# Patient Record
Sex: MALE | Race: BLACK OR AFRICAN AMERICAN | NOT HISPANIC OR LATINO | Employment: FULL TIME | URBAN - METROPOLITAN AREA
[De-identification: names, ages, dates, MRNs, and addresses within clinical notes are randomized per-mention and may not be internally consistent; named-entity substitution may affect disease eponyms.]

---

## 2017-01-05 ENCOUNTER — HOSPITAL ENCOUNTER (OUTPATIENT)
Dept: RADIOLOGY | Facility: HOSPITAL | Age: 52
Discharge: HOME | End: 2017-01-05
Payer: COMMERCIAL

## 2017-01-05 ENCOUNTER — TRANSCRIBE ORDERS (OUTPATIENT)
Dept: ADMINISTRATIVE | Facility: HOSPITAL | Age: 52
End: 2017-01-05

## 2017-01-05 ENCOUNTER — LAB (OUTPATIENT)
Dept: LAB | Facility: HOSPITAL | Age: 52
End: 2017-01-05
Payer: COMMERCIAL

## 2017-01-05 ENCOUNTER — APPOINTMENT (OUTPATIENT)
Dept: LAB | Facility: HOSPITAL | Age: 52
End: 2017-01-05
Payer: COMMERCIAL

## 2017-01-05 ENCOUNTER — ALLSCRIPTS OFFICE VISIT (OUTPATIENT)
Dept: OTHER | Facility: OTHER | Age: 52
End: 2017-01-05

## 2017-01-05 DIAGNOSIS — E55.9 VITAMIN D DEFICIENCY: ICD-10-CM

## 2017-01-05 DIAGNOSIS — R21 RASH AND OTHER NONSPECIFIC SKIN ERUPTION: Primary | ICD-10-CM

## 2017-01-05 DIAGNOSIS — K42.9 UMBILICAL HERNIA WITHOUT OBSTRUCTION OR GANGRENE: ICD-10-CM

## 2017-01-05 DIAGNOSIS — R68.83 CHILLS (WITHOUT FEVER): ICD-10-CM

## 2017-01-05 DIAGNOSIS — Z98.84 BARIATRIC SURGERY STATUS: ICD-10-CM

## 2017-01-05 DIAGNOSIS — H61.23 IMPACTED CERUMEN OF BOTH EARS: ICD-10-CM

## 2017-01-05 DIAGNOSIS — R50.9 FEVER: ICD-10-CM

## 2017-01-05 DIAGNOSIS — R11.2 NAUSEA WITH VOMITING: ICD-10-CM

## 2017-01-05 DIAGNOSIS — E11.9 TYPE 2 DIABETES MELLITUS WITHOUT COMPLICATIONS (HCC): ICD-10-CM

## 2017-01-05 DIAGNOSIS — R53.83 OTHER FATIGUE: ICD-10-CM

## 2017-01-05 DIAGNOSIS — R21 RASH AND OTHER NONSPECIFIC SKIN ERUPTION: ICD-10-CM

## 2017-01-05 DIAGNOSIS — M79.672 PAIN OF LEFT FOOT: ICD-10-CM

## 2017-01-05 LAB
25(OH)D3 SERPL-MCNC: 20.9 NG/ML (ref 30–100)
ALBUMIN SERPL BCP-MCNC: 3.6 G/DL (ref 3.5–5)
ALP SERPL-CCNC: 89 U/L (ref 46–116)
ALT SERPL W P-5'-P-CCNC: 36 U/L (ref 12–78)
ANION GAP SERPL CALCULATED.3IONS-SCNC: 5 MMOL/L (ref 4–13)
AST SERPL W P-5'-P-CCNC: 18 U/L (ref 5–45)
BASOPHILS # BLD AUTO: 0.03 THOUSANDS/ΜL (ref 0–0.1)
BASOPHILS NFR BLD AUTO: 1 % (ref 0–1)
BILIRUB SERPL-MCNC: 0.3 MG/DL (ref 0.2–1)
BUN SERPL-MCNC: 9 MG/DL (ref 5–25)
CALCIUM SERPL-MCNC: 8.2 MG/DL (ref 8.3–10.1)
CHLORIDE SERPL-SCNC: 102 MMOL/L (ref 100–108)
CO2 SERPL-SCNC: 31 MMOL/L (ref 21–32)
CREAT SERPL-MCNC: 0.77 MG/DL (ref 0.6–1.3)
CREAT UR-MCNC: 230 MG/DL
EOSINOPHIL # BLD AUTO: 0.25 THOUSAND/ΜL (ref 0–0.61)
EOSINOPHIL NFR BLD AUTO: 4 % (ref 0–6)
ERYTHROCYTE [DISTWIDTH] IN BLOOD BY AUTOMATED COUNT: 13.1 % (ref 11.6–15.1)
EST. AVERAGE GLUCOSE BLD GHB EST-MCNC: 123 MG/DL
GFR SERPL CREATININE-BSD FRML MDRD: >60 ML/MIN/1.73SQ M
GLUCOSE SERPL-MCNC: 84 MG/DL (ref 65–140)
HBA1C MFR BLD: 5.9 % (ref 4.2–6.3)
HCT VFR BLD AUTO: 43.3 % (ref 36.5–49.3)
HGB BLD-MCNC: 13.8 G/DL (ref 12–17)
IRON SATN MFR SERPL: 17 %
IRON SERPL-MCNC: 65 UG/DL (ref 65–175)
LYMPHOCYTES # BLD AUTO: 2.42 THOUSANDS/ΜL (ref 0.6–4.47)
LYMPHOCYTES NFR BLD AUTO: 37 % (ref 14–44)
MCH RBC QN AUTO: 29.2 PG (ref 26.8–34.3)
MCHC RBC AUTO-ENTMCNC: 31.9 G/DL (ref 31.4–37.4)
MCV RBC AUTO: 92 FL (ref 82–98)
MICROALBUMIN UR-MCNC: 10.1 MG/L (ref 0–20)
MICROALBUMIN/CREAT 24H UR: 4 MG/G CREATININE (ref 0–30)
MONOCYTES # BLD AUTO: 0.79 THOUSAND/ΜL (ref 0.17–1.22)
MONOCYTES NFR BLD AUTO: 12 % (ref 4–12)
NEUTROPHILS # BLD AUTO: 3.12 THOUSANDS/ΜL (ref 1.85–7.62)
NEUTS SEG NFR BLD AUTO: 47 % (ref 43–75)
NRBC BLD AUTO-RTO: 0 /100 WBCS
PLATELET # BLD AUTO: 199 THOUSANDS/UL (ref 149–390)
PMV BLD AUTO: 8.9 FL (ref 8.9–12.7)
POTASSIUM SERPL-SCNC: 3.8 MMOL/L (ref 3.5–5.3)
PROT SERPL-MCNC: 7.5 G/DL (ref 6.4–8.2)
RBC # BLD AUTO: 4.72 MILLION/UL (ref 3.88–5.62)
SODIUM SERPL-SCNC: 138 MMOL/L (ref 136–145)
TIBC SERPL-MCNC: 392 UG/DL (ref 250–450)
VIT B12 SERPL-MCNC: 490 PG/ML (ref 100–900)
WBC # BLD AUTO: 6.62 THOUSAND/UL (ref 4.31–10.16)

## 2017-01-05 PROCEDURE — 82306 VITAMIN D 25 HYDROXY: CPT

## 2017-01-05 PROCEDURE — 83540 ASSAY OF IRON: CPT

## 2017-01-05 PROCEDURE — 71020 HB CHEST X-RAY 2VW FRONTAL&LATL: CPT

## 2017-01-05 PROCEDURE — 85025 COMPLETE CBC W/AUTO DIFF WBC: CPT

## 2017-01-05 PROCEDURE — 83550 IRON BINDING TEST: CPT

## 2017-01-05 PROCEDURE — 36415 COLL VENOUS BLD VENIPUNCTURE: CPT

## 2017-01-05 PROCEDURE — 82570 ASSAY OF URINE CREATININE: CPT

## 2017-01-05 PROCEDURE — 84590 ASSAY OF VITAMIN A: CPT

## 2017-01-05 PROCEDURE — 82607 VITAMIN B-12: CPT

## 2017-01-05 PROCEDURE — 86592 SYPHILIS TEST NON-TREP QUAL: CPT

## 2017-01-05 PROCEDURE — 82043 UR ALBUMIN QUANTITATIVE: CPT

## 2017-01-05 PROCEDURE — 83036 HEMOGLOBIN GLYCOSYLATED A1C: CPT

## 2017-01-05 PROCEDURE — 86710 INFLUENZA VIRUS ANTIBODY: CPT

## 2017-01-05 PROCEDURE — 80053 COMPREHEN METABOLIC PANEL: CPT

## 2017-01-06 LAB — RPR SER QL: NORMAL

## 2017-01-08 LAB
FLUAV AB TITR SER CF: ABNORMAL {TITER}
FLUBV AB TITR SER CF: ABNORMAL {TITER}

## 2017-01-09 LAB — VIT A SERPL-MCNC: 39 UG/DL (ref 24–85)

## 2017-01-10 ENCOUNTER — ALLSCRIPTS OFFICE VISIT (OUTPATIENT)
Dept: OTHER | Facility: OTHER | Age: 52
End: 2017-01-10

## 2017-01-31 ENCOUNTER — ALLSCRIPTS OFFICE VISIT (OUTPATIENT)
Dept: OTHER | Facility: OTHER | Age: 52
End: 2017-01-31

## 2017-05-10 DIAGNOSIS — E55.9 VITAMIN D DEFICIENCY: ICD-10-CM

## 2017-05-10 DIAGNOSIS — Z98.84 BARIATRIC SURGERY STATUS: ICD-10-CM

## 2017-05-10 DIAGNOSIS — K91.2 POSTSURGICAL MALABSORPTION, NOT ELSEWHERE CLASSIFIED: ICD-10-CM

## 2017-05-10 DIAGNOSIS — E11.9 TYPE 2 DIABETES MELLITUS WITHOUT COMPLICATIONS (HCC): ICD-10-CM

## 2017-05-10 DIAGNOSIS — I10 ESSENTIAL (PRIMARY) HYPERTENSION: ICD-10-CM

## 2017-05-30 ENCOUNTER — GENERIC CONVERSION - ENCOUNTER (OUTPATIENT)
Dept: OTHER | Facility: OTHER | Age: 52
End: 2017-05-30

## 2017-05-31 LAB
25(OH)D3 SERPL-MCNC: 26.7 NG/ML (ref 30–100)
A/G RATIO (HISTORICAL): 1.7 (ref 1.2–2.2)
ALBUMIN SERPL BCP-MCNC: 4.2 G/DL (ref 3.5–5.5)
ALP SERPL-CCNC: 65 IU/L (ref 39–117)
ALT SERPL W P-5'-P-CCNC: 26 IU/L (ref 0–44)
AST SERPL W P-5'-P-CCNC: 20 IU/L (ref 0–40)
BASOPHILS # BLD AUTO: 0 X10E3/UL (ref 0–0.2)
BASOPHILS # BLD AUTO: 1 %
BILIRUB SERPL-MCNC: 0.5 MG/DL (ref 0–1.2)
BUN SERPL-MCNC: 11 MG/DL (ref 6–24)
BUN/CREA RATIO (HISTORICAL): 14 (ref 9–20)
CALCIUM SERPL-MCNC: 9.1 MG/DL (ref 8.7–10.2)
CHLORIDE SERPL-SCNC: 102 MMOL/L (ref 96–106)
CHOLEST SERPL-MCNC: 151 MG/DL (ref 100–199)
CO2 SERPL-SCNC: 24 MMOL/L (ref 18–29)
CREAT SERPL-MCNC: 0.81 MG/DL (ref 0.76–1.27)
CREATININE, URINE (HISTORICAL): 279.9 MG/DL
DEPRECATED RDW RBC AUTO: 13.4 % (ref 12.3–15.4)
EGFR AFRICAN AMERICAN (HISTORICAL): 116 ML/MIN/1.73
EGFR-AMERICAN CALC (HISTORICAL): 101 ML/MIN/1.73
EOSINOPHIL # BLD AUTO: 0.2 X10E3/UL (ref 0–0.4)
EOSINOPHIL # BLD AUTO: 3 %
GLUCOSE SERPL-MCNC: 87 MG/DL (ref 65–99)
HBA1C MFR BLD HPLC: 6 % (ref 4.8–5.6)
HCT VFR BLD AUTO: 39.8 % (ref 37.5–51)
HDLC SERPL-MCNC: 59 MG/DL
HGB BLD-MCNC: 13.2 G/DL (ref 12.6–17.7)
IMM.GRANULOCYTES (CD4/8) (HISTORICAL): 0 %
IMM.GRANULOCYTES (CD4/8) (HISTORICAL): 0 X10E3/UL (ref 0–0.1)
IRON SATN MFR SERPL: 28 % (ref 15–55)
IRON SERPL-MCNC: 111 UG/DL (ref 38–169)
LDL CHOLESTEROL DIRECT (HISTORICAL): 87 MG/DL (ref 0–99)
LDLC SERPL CALC-MCNC: 83 MG/DL (ref 0–99)
LYMPHOCYTES # BLD AUTO: 2.4 X10E3/UL (ref 0.7–3.1)
LYMPHOCYTES # BLD AUTO: 42 %
MCH RBC QN AUTO: 29.6 PG (ref 26.6–33)
MCHC RBC AUTO-ENTMCNC: 33.2 G/DL (ref 31.5–35.7)
MCV RBC AUTO: 89 FL (ref 79–97)
MICROALBUM.,U,RANDOM (HISTORICAL): 10.5 UG/ML
MICROALBUMIN/CREATININE RATIO (HISTORICAL): 3.8 MG/G CREAT (ref 0–30)
MONOCYTES # BLD AUTO: 0.4 X10E3/UL (ref 0.1–0.9)
MONOCYTES (HISTORICAL): 7 %
NEUTROPHILS # BLD AUTO: 2.7 X10E3/UL (ref 1.4–7)
NEUTROPHILS # BLD AUTO: 47 %
PLATELET # BLD AUTO: 210 X10E3/UL (ref 150–379)
POTASSIUM SERPL-SCNC: 4.1 MMOL/L (ref 3.5–5.2)
RBC (HISTORICAL): 4.46 X10E6/UL (ref 4.14–5.8)
SODIUM SERPL-SCNC: 142 MMOL/L (ref 134–144)
TIBC SERPL-MCNC: 397 UG/DL (ref 250–450)
TOT. GLOBULIN, SERUM (HISTORICAL): 2.5 G/DL (ref 1.5–4.5)
TOTAL PROTEIN (HISTORICAL): 6.7 G/DL (ref 6–8.5)
TRIGL SERPL-MCNC: 43 MG/DL (ref 0–149)
TSH SERPL DL<=0.05 MIU/L-ACNC: 2.25 UIU/ML (ref 0.45–4.5)
UIBC (HISTORICAL): 286 UG/DL (ref 111–343)
VIT B12 SERPL-MCNC: 1170 PG/ML (ref 211–946)
VLDLC SERPL CALC-MCNC: 9 MG/DL (ref 5–40)
WBC # BLD AUTO: 5.7 X10E3/UL (ref 3.4–10.8)

## 2017-06-01 LAB — VITAMIN B1, WHOLE BLOOD (HISTORICAL): 82.4 NMOL/L (ref 66.5–200)

## 2017-06-07 ENCOUNTER — ALLSCRIPTS OFFICE VISIT (OUTPATIENT)
Dept: OTHER | Facility: OTHER | Age: 52
End: 2017-06-07

## 2017-08-07 ENCOUNTER — ALLSCRIPTS OFFICE VISIT (OUTPATIENT)
Dept: OTHER | Facility: OTHER | Age: 52
End: 2017-08-07

## 2017-08-25 ENCOUNTER — GENERIC CONVERSION - ENCOUNTER (OUTPATIENT)
Dept: OTHER | Facility: OTHER | Age: 52
End: 2017-08-25

## 2017-09-26 ENCOUNTER — GENERIC CONVERSION - ENCOUNTER (OUTPATIENT)
Dept: OTHER | Facility: OTHER | Age: 52
End: 2017-09-26

## 2017-09-26 LAB
AMBIG ABBREV CMP14 DEFAULT (HISTORICAL): NORMAL
AMBIG ABBREV LP DEFAULT (HISTORICAL): NORMAL
DEPRECATED RDW RBC AUTO: 13.6 % (ref 12.3–15.4)
HBA1C MFR BLD HPLC: 5.8 % (ref 4.8–5.6)
HCT VFR BLD AUTO: 41.8 % (ref 37.5–51)
HGB BLD-MCNC: 13.5 G/DL (ref 12.6–17.7)
MCH RBC QN AUTO: 29.3 PG (ref 26.6–33)
MCHC RBC AUTO-ENTMCNC: 32.3 G/DL (ref 31.5–35.7)
MCV RBC AUTO: 91 FL (ref 79–97)
PLATELET # BLD AUTO: 239 X10E3/UL (ref 150–379)
RBC (HISTORICAL): 4.6 X10E6/UL (ref 4.14–5.8)
WBC # BLD AUTO: 6 X10E3/UL (ref 3.4–10.8)

## 2017-09-27 LAB
25(OH)D3 SERPL-MCNC: 21.4 NG/ML (ref 30–100)
A/G RATIO (HISTORICAL): 1.4 (ref 1.2–2.2)
ALBUMIN SERPL BCP-MCNC: 4 G/DL (ref 3.5–5.5)
ALP SERPL-CCNC: 67 IU/L (ref 39–117)
ALT SERPL W P-5'-P-CCNC: 33 IU/L (ref 0–44)
AST SERPL W P-5'-P-CCNC: 25 IU/L (ref 0–40)
BILIRUB SERPL-MCNC: 0.4 MG/DL (ref 0–1.2)
BUN SERPL-MCNC: 8 MG/DL (ref 6–24)
BUN/CREA RATIO (HISTORICAL): 11 (ref 9–20)
CALCIUM SERPL-MCNC: 9 MG/DL (ref 8.7–10.2)
CHLORIDE SERPL-SCNC: 104 MMOL/L (ref 96–106)
CHOLEST SERPL-MCNC: 155 MG/DL (ref 100–199)
CO2 SERPL-SCNC: 24 MMOL/L (ref 18–29)
CREAT SERPL-MCNC: 0.74 MG/DL (ref 0.76–1.27)
EGFR AFRICAN AMERICAN (HISTORICAL): 121 ML/MIN/1.73
EGFR-AMERICAN CALC (HISTORICAL): 105 ML/MIN/1.73
FERRITIN SERPL-MCNC: 62 NG/ML (ref 30–400)
FOLATE SERPL-MCNC: 5.9 NG/ML
GLUCOSE SERPL-MCNC: 93 MG/DL (ref 65–99)
HDLC SERPL-MCNC: 62 MG/DL
LDLC SERPL CALC-MCNC: 82 MG/DL (ref 0–99)
POTASSIUM SERPL-SCNC: 4.2 MMOL/L (ref 3.5–5.2)
PTH-INTACT SERPL-MCNC: 66 PG/ML (ref 15–65)
SODIUM SERPL-SCNC: 143 MMOL/L (ref 134–144)
TOT. GLOBULIN, SERUM (HISTORICAL): 2.9 G/DL (ref 1.5–4.5)
TOTAL PROTEIN (HISTORICAL): 6.9 G/DL (ref 6–8.5)
TRIGL SERPL-MCNC: 57 MG/DL (ref 0–149)
VIT B12 SERPL-MCNC: 569 PG/ML (ref 211–946)
VLDLC SERPL CALC-MCNC: 11 MG/DL (ref 5–40)

## 2017-09-28 LAB — VITAMIN A LEVEL (HISTORICAL): 54 UG/DL (ref 24–85)

## 2017-09-29 LAB — VITAMIN B1, WHOLE BLOOD (HISTORICAL): 100.6 NMOL/L (ref 66.5–200)

## 2017-10-04 ENCOUNTER — GENERIC CONVERSION - ENCOUNTER (OUTPATIENT)
Dept: OTHER | Facility: OTHER | Age: 52
End: 2017-10-04

## 2017-10-27 ENCOUNTER — GENERIC CONVERSION - ENCOUNTER (OUTPATIENT)
Dept: OTHER | Facility: OTHER | Age: 52
End: 2017-10-27

## 2017-10-28 ENCOUNTER — TRANSCRIBE ORDERS (OUTPATIENT)
Dept: ADMINISTRATIVE | Facility: HOSPITAL | Age: 52
End: 2017-10-28

## 2017-10-28 DIAGNOSIS — R10.9 ACUTE ABDOMINAL PAIN: Primary | ICD-10-CM

## 2017-10-28 DIAGNOSIS — Z98.84 STATUS POST BARIATRIC SURGERY: ICD-10-CM

## 2017-10-28 DIAGNOSIS — K43.9 EPIGASTRIC HERNIA: ICD-10-CM

## 2017-11-01 ENCOUNTER — HOSPITAL ENCOUNTER (OUTPATIENT)
Dept: CT IMAGING | Facility: HOSPITAL | Age: 52
Discharge: HOME | End: 2017-11-01
Payer: COMMERCIAL

## 2017-11-01 ENCOUNTER — GENERIC CONVERSION - ENCOUNTER (OUTPATIENT)
Dept: OTHER | Facility: OTHER | Age: 52
End: 2017-11-01

## 2017-11-01 DIAGNOSIS — K43.9 EPIGASTRIC HERNIA: ICD-10-CM

## 2017-11-01 DIAGNOSIS — R10.9 ACUTE ABDOMINAL PAIN: ICD-10-CM

## 2017-11-01 DIAGNOSIS — Z98.84 STATUS POST BARIATRIC SURGERY: ICD-10-CM

## 2017-11-01 PROCEDURE — 74177 CT ABD & PELVIS W/CONTRAST: CPT

## 2017-11-01 RX ADMIN — IOHEXOL 100 ML: 350 INJECTION, SOLUTION INTRAVENOUS at 07:26

## 2017-11-09 ENCOUNTER — GENERIC CONVERSION - ENCOUNTER (OUTPATIENT)
Dept: OTHER | Facility: OTHER | Age: 52
End: 2017-11-09

## 2017-11-27 ENCOUNTER — GENERIC CONVERSION - ENCOUNTER (OUTPATIENT)
Dept: OTHER | Facility: OTHER | Age: 52
End: 2017-11-27

## 2017-12-07 DIAGNOSIS — E55.9 VITAMIN D DEFICIENCY: ICD-10-CM

## 2017-12-07 DIAGNOSIS — E11.9 TYPE 2 DIABETES MELLITUS WITHOUT COMPLICATIONS (HCC): ICD-10-CM

## 2017-12-07 DIAGNOSIS — I10 ESSENTIAL (PRIMARY) HYPERTENSION: ICD-10-CM

## 2017-12-07 DIAGNOSIS — Z12.5 ENCOUNTER FOR SCREENING FOR MALIGNANT NEOPLASM OF PROSTATE: ICD-10-CM

## 2017-12-07 DIAGNOSIS — Z11.59 ENCOUNTER FOR SCREENING FOR OTHER VIRAL DISEASES: ICD-10-CM

## 2017-12-07 DIAGNOSIS — B37.2 CANDIDIASIS OF SKIN AND NAIL: ICD-10-CM

## 2017-12-07 DIAGNOSIS — Z98.84 BARIATRIC SURGERY STATUS: ICD-10-CM

## 2017-12-07 DIAGNOSIS — K91.2 POSTSURGICAL MALABSORPTION, NOT ELSEWHERE CLASSIFIED: ICD-10-CM

## 2018-01-11 NOTE — MISCELLANEOUS
Message  I had previously reviewed CT findings briefly with patient  Advised him that he should make follow-up with Dr Ilana Verdin  I called and left him a message as a reminder to make a follow-up with Dr Ilana Verdin to review his most recent CT scan of abdomen/pelvis   CR      Signatures   Electronically signed by : CELSA Keller; Nov 9 2017 11:53AM EST                       (Author)

## 2018-01-11 NOTE — MISCELLANEOUS
Message  Return to work or school:   Zeferino Be is under my professional care  He was seen in my office on 01/29/2016   Patient brought daughter Christina Maddox for consult appointment for bariatric surgery                Signatures   Electronically signed by : Krzysztof Angel, ; Jan 29 2016  2:13PM EST                       (Author)

## 2018-01-13 VITALS
SYSTOLIC BLOOD PRESSURE: 120 MMHG | HEART RATE: 57 BPM | DIASTOLIC BLOOD PRESSURE: 80 MMHG | WEIGHT: 296 LBS | BODY MASS INDEX: 41.44 KG/M2 | TEMPERATURE: 96.4 F | OXYGEN SATURATION: 98 % | HEIGHT: 71 IN

## 2018-01-13 VITALS
HEART RATE: 55 BPM | BODY MASS INDEX: 41.02 KG/M2 | DIASTOLIC BLOOD PRESSURE: 80 MMHG | HEIGHT: 71 IN | WEIGHT: 293 LBS | OXYGEN SATURATION: 97 % | TEMPERATURE: 95.9 F | SYSTOLIC BLOOD PRESSURE: 126 MMHG

## 2018-01-14 VITALS
WEIGHT: 289 LBS | OXYGEN SATURATION: 98 % | SYSTOLIC BLOOD PRESSURE: 132 MMHG | DIASTOLIC BLOOD PRESSURE: 74 MMHG | TEMPERATURE: 96.7 F | HEIGHT: 71 IN | HEART RATE: 62 BPM | BODY MASS INDEX: 40.46 KG/M2

## 2018-01-14 VITALS
SYSTOLIC BLOOD PRESSURE: 125 MMHG | HEART RATE: 63 BPM | OXYGEN SATURATION: 98 % | TEMPERATURE: 97.3 F | DIASTOLIC BLOOD PRESSURE: 80 MMHG

## 2018-01-14 NOTE — MISCELLANEOUS
Provider Comments  Provider Comments:   PATIENT NO SHOWED FOR 10- APPT  Signatures   Electronically signed by :  Juliana Nice; Oct 25 2016 10:56AM EST                       (Author)

## 2018-01-15 VITALS
TEMPERATURE: 97.3 F | HEIGHT: 71 IN | DIASTOLIC BLOOD PRESSURE: 80 MMHG | HEART RATE: 54 BPM | SYSTOLIC BLOOD PRESSURE: 130 MMHG | OXYGEN SATURATION: 97 % | BODY MASS INDEX: 41.23 KG/M2 | WEIGHT: 294.5 LBS

## 2018-01-15 NOTE — PROGRESS NOTES
Message  placed follow up call  scheduled appointment for weight regain for 1/10/2018 at 2pm  invited DM, patient's dietitian  Active Problems    1  Abdominal pain (789 00) (R10 9)   2  Backache (724 5) (M54 9)   3  Balanitis (607 1) (N48 1)   4  BRBPR (bright red blood per rectum) (569 3) (K62 5)   5  Candidal skin infection (112 3) (B37 2)   6  Constipation (564 00) (K59 00)   7  Contact dermatitis (692 9) (L25 9)   8  Diabetes mellitus type II, controlled (250 00) (E11 9)   9  Discoloration of skin (709 00) (L81 9)   10  Encounter for colorectal cancer screening (V76 51) (Z12 11,Z12 12)   11  Encounter for prostate cancer screening (V76 44) (Z12 5)   12  Fatigue (780 79) (R53 83)   13  Fever (780 60) (R50 9)   14  Flu vaccine need (V04 81) (Z23)   15  H/O bariatric surgery (V45 86) (Z98 84)   16  Hypertension (401 9) (I10)   17  Impacted cerumen of both ears (380 4) (H61 23)   18  Influenza (487 1) (J11 1)   19  Internal hemorrhoid (455 0) (K64 8)   20  Left foot pain (729 5) (M79 672)   21  Lichen planus (777 7) (L43 9)   22  Nausea and vomiting (787 01) (R11 2)   23  Need for hepatitis C screening test (V73 89) (Z11 59)   24  Obesity surgery status (V45 86) (Z98 84)   25  Organic impotence (607 84) (N52 9)   26  Osteoarthritis of first metatarsophalangeal joint (715 98) (M19 079)   27  Penile cyst (607 89) (N48 89)   28  Postgastrectomy malabsorption (579 3) (K91 2,Z90 3)   29  Rash (782 1) (R21)   30  Seborrheic keratoses, inflamed (702 11) (L82 0)   31  Secondary hyperparathyroidism (588 81) (N25 81)   32  Secondary non-renal hyperparathyroidism (252 02) (E21 1)   33  Skin lesion (709 9) (L98 9)   34  Status post gastric surgery (V45 89) (Z98 890)   35  Umbilical hernia (087 2) (K42 9)   36  Ventral hernia (553 20) (K43 9)   37  Viral gastroenteritis (008 8) (A08 4)   38  Vitamin D deficiency (268 9) (E55 9)   39  Weight gain following gastric bypass surgery (783 1) (R63 5)    Current Meds   1  Anusol-HC 25 MG Rectal Suppository; INSERT 1 SUPPOSITORY RECTALLY 2 TIMES   DAILY; Therapy: 11Aug2015 to (Evaluate:17Aug2015)  Requested for: 11Aug2015; Last   Rx:11Aug2015 Ordered   2  Blood Glucose Monitor System w/Device Kit; use as directed; Therapy: 39GRM0076 to (Last Rx:19Oct2015)  Requested for: 19Oct2015 Ordered   3  Calcium Citrate +D TABS Recorded   4  Cialis 20 MG Oral Tablet; Therapy: (Recorded:26Hky3085) to Recorded   5  Clobetasol Propionate 0 05 % External Ointment; Therapy: (Recorded:07Jun2017) to Recorded   6  Debrox 6 5 % Otic Solution; Instill 5-10 drops each ear twice per day x 7 days; Therapy: 56OOC3096 to (Last Rx:10Jan2017) Ordered   7  Docusate Sodium 100 MG Oral Capsule; Take 1 daily; Therapy: 11Aug2015 to (Evaluate:10Sep2015)  Requested for: 11Aug2015; Last   Rx:11Aug2015 Ordered   8  Econazole Nitrate 1 % External Cream; APPLY AND GENTLY MASSAGE INTO   AFFECTED AREA(S) TWICE DAILY; Therapy: 86EWN4155 to (Last Rx:07Jun2017)  Requested for: 07Jun2017 Ordered   9  FreeStyle Lancets Miscellaneous; TEST BLOOD SUGAR ONCE DAILY; Therapy: 44Ndu3932 to (Last Rx:02Sep2016)  Requested for: 02Sep2016 Ordered   10  FreeStyle Test In Vitro Strip; TEST BLOOD SUGAS TWICE DAILY but at different times    before every meal and 2 hrs after every meal and at HS; Therapy: 74Hmp9867 to (Last Rx:02Sep2016)  Requested for: 02Sep2016 Ordered   11  Multi-Vitamin TABS; Therapy: (Barry Saliva) to Recorded   12  Nystatin 526472 UNIT/GM External Cream; Apply twice daily; Therapy: 17Pfa7991 to (Evaluate:04Oct2016)  Requested for: 14Sep2016; Last    Rx:14Sep2016 Ordered   13  Omega 3 CAPS; Therapy: (YQGNXQJK:58QVH2409) to Recorded   14  Vitamin B-12 5000 MCG Sublingual Tablet Sublingual Recorded   15  Vitamin D3 2000 UNIT Oral Tablet; Take 1 tablet daily; Therapy: 07RAZ1966 to (Last Rx:07Jun2017) Ordered    Allergies    1   No Known Drug Allergies    Signatures   Electronically signed by : Miles Sanders; Nov 27 2017 12:39PM EST                       (Author)

## 2018-01-15 NOTE — MISCELLANEOUS
Provider Comments  Provider Comments:     Dear Karen Thompson had a scheduled appointment at our office for 08/25/2017 that you called to cancel but did not reschedule for another day  It is very important that you follow up with us so that we can assess your physical and nutritional safety after your surgery  Please call our office at [phone] to reschedule your appointment       Sincerely,     [facility]            Signatures   Electronically signed by : Lauren Hurd, ; Aug 25 2017  8:45AM EST                       (Author)

## 2018-01-16 NOTE — RESULT NOTES
Discussion/Summary   September 2017 labs reviewed  Your parathyroid hormone level remains mildly high at 66 but has improved slightly from previous level of 68-cuurent mildly high level can indicate that you are losing calcium from your bones and /or not getting enough vitamin D  Recommend that you take a TOTAL of 1500 mg-1900 mg of calcium per day (this includes the calcium in all your supplements added together)  Calcium needs to be taken in divided doses of 500 to 600 mg each  spaced through the day and at least 2 hours apart and 2 hours from any iron for best absorption      Last year I had referred you to [facility]   If you did not get a call back from their office last year, please let me know  You would need your parathryoid hormone level, calcium level and vitamin D level repeated in 3-4 months  I would like you to see the endocrinologist for this now  Your vitamin D remains low at 21 4  I am sending a prescription to your pharmacy for high potency vitamin D2- 50,000 IU -take this weekly for 8 weeks   This can also be followed by the endocrinologist    If you have not had a DEXA bone scan to check for osteoporosis i would highly recommend that you discuss this with your PCP or Endocrinologist   Once you are eligible for this insurance generally allows this test every 2 years  Your hemoglobin A 1c is a report card of your blood sugar control over the past 3 months  Your hemoglobin is in the âpre-diabetic rangeâ at 5 8%  Continued weight loss (IF NEEDED) can help improve this level  Avoiding simple sugars and having more âcomplex carbohydratesâ in the diet -like vegetables, fruits and whole grains as your diet allows can also help  â   You should also review this with your PCP/Endocrinologist at a routine office visit  The rest of your vitamin/mineral levels are within acceptable range  Please keep your routine office visit   If you do not have a scheduled routine appointment, please call the office to schedule it  Our office number is [phone]  If you have questions about your results, this will be discussed with you at your upcoming  visit  If you have gotten your most recent labs after your recent visit and have questions, please call the office  I look forward to seeing you at your next visit  Last year you were taking an Extra 5000 IU of vitamin D3 daily  -you can hold the daily dose until you have completed the 8 weeks of high dose vitamin D-but then should restart the daily dose-      You should add an EXTRA 2000 IU of vitamin D3 daily to whatever you are currently taking   (so if you were taking 5000 IU daily then increase it to 7000 IU daily)              Verified Results  (1) COMPREHENSIVE METABOLIC PANEL 23FCH8701 89:68XD Shiawasseeedis Garza     Test Name Result Flag Reference   Glucose, Serum 93 mg/dL  65-99   BUN 8 mg/dL  6-24   Creatinine, Serum 0 74 mg/dL L 0 76-1 27   BUN/Creatinine Ratio 11  9-20   Sodium, Serum 143 mmol/L  134-144   Potassium, Serum 4 2 mmol/L  3 5-5 2   Chloride, Serum 104 mmol/L     Carbon Dioxide, Total 24 mmol/L  18-29   Calcium, Serum 9 0 mg/dL  8 7-10 2   Protein, Total, Serum 6 9 g/dL  6 0-8 5   Albumin, Serum 4 0 g/dL  3 5-5 5   Globulin, Total 2 9 g/dL  1 5-4 5   A/G Ratio 1 4  1 2-2 2   Bilirubin, Total 0 4 mg/dL  0 0-1 2   Alkaline Phosphatase, S 67 IU/L     AST (SGOT) 25 IU/L  0-40   ALT (SGPT) 33 IU/L  0-44   eGFR If NonAfricn Am 105 mL/min/1 73  >59   eGFR If Africn Am 121 mL/min/1 73  >59     (1) CBC/ PLT (NO DIFF) 64Ssm2066 10:36AM Shiawasseeedis Garza     Test Name Result Flag Reference   WBC 6 0 x10E3/uL  3 4-10 8   RBC 4 60 x10E6/uL  4 14-5 80   Hemoglobin 13 5 g/dL  12 6-17 7   Hematocrit 41 8 %  37 5-51 0   MCV 91 fL  79-97   MCH 29 3 pg  26 6-33 0   MCHC 32 3 g/dL  31 5-35 7   RDW 13 6 %  12 3-15 4   Platelets 857 V11S8/ZN  150-379     ([address] [place] [address]) Lipid Panel 78BLQ2167 10:36AM Lizabeth, Monique Ambrose     Test Name Result Flag Reference   Cholesterol, Total 155 mg/dL  100-199   Triglycerides 57 mg/dL  0-149   HDL Cholesterol 62 mg/dL  >39   VLDL Cholesterol Jose 11 mg/dL  5-40   LDL Cholesterol Calc 82 mg/dL  0-99     (1) HEMOGLOBIN A1C 18Pnf5086 10:36AM Haijosevale Sheron     Test Name Result Flag Reference   Hemoglobin A1c 5 8 % H 4 8-5 6   Pre-diabetes: 5 7 - 6 4           Diabetes: >6 4           Glycemic control for adults with diabetes: <7 0     (1) FOLATE 67Cvs4495 10:36AM EdwardAaron longmakayla Ambrose     Test Name Result Flag Reference   Folate (Folic Acid), Serum 5 9 ng/mL  >3 0   A serum folate concentration of less than 3 1 ng/mL is  considered to represent clinical deficiency  (1) VITAMIN D 25-HYDROXY 26Sep2017 10:36AM Lizabeth Monique Ambrose     Test Name Result Flag Reference   Vitamin D, 25-Hydroxy 21 4 ng/mL L 30 0-100 0   Vitamin D deficiency has been defined by the [address] [place] [address] 70 practice guideline as a  level of serum 25-OH vitamin D less than 20 ng/mL (1,2)  The Endocrine Society went on to further define vitamin D  insufficiency as a level between 21 and 29 ng/mL (2)  1  IOM ([place] of [employer])  2010  Dietary reference     intakes for calcium and D  [facility]: The     [employer]  2  Edson MF, Roberto POLANCO, Gabby DOLL, et al      Evaluation, treatment, and prevention of vitamin D     deficiency: an Endocrine Society clinical practice     guideline  JCEM  2011 Jul; 96(7):1911-30  (1) VITAMIN B12 19Tgc0467 10:36AM Terry [place]     Test Name Result Flag Reference   Vitamin B12 569 pg/mL  211-946     (1) FERRITIN 92WBQ1502 10:36AM [facility]     Test Name Result Flag Reference   Ferritin, Serum 62 ng/mL       [facility]) Haile Cortez CMP14 Default 42CNZ7265 10:36AM Terry [place]     Test Name Result Flag Reference   Haile Cortez CMP14 Default Comment     A hand-written panel/profile was received from your office  In  accordance with the [facility] Ambiguous Test Code Policy dated July 0913, we have completed your order by using the closest currently  or formerly recognized AMA panel  We have assigned Comprehensive  Metabolic Panel (14), Test Code #568437 to this request   If this  is not the testing you wished to receive on this specimen, please  contact the [address] [place] [address] Client Inquiry/Technical Services Department  to clarify the test order  We appreciate your business  [facility]) Marni Alcazaric LP Default [id number] 10:36AM Kelly Gutierrez     Test Name Result Flag Reference   Ambisrael Abbrev LP Default Comment     A hand-written panel/profile was received from your office  In  accordance with the [facility] Ambiguous Test Code Policy dated July 3509, we have completed your order by using the closest currently  or formerly recognized AMA panel  We have assigned Lipid Panel,  Test Code #812389 to this request  If this is not the testing you  wished to receive on this specimen, please contact the [address] [place] [address]  Client Inquiry/Technical Services Department to clarify the test  order  We appreciate your business  (1) PTH N-TERMINAL (INTACT) 86BYO5357 10:36AM Franklin Barone     Test Name Result Flag Reference   PTH, Intact 66 pg/mL H 15-65     (1) VITAMIN B1, WHOLE BLOOD 13WZX8890 10:36AM Kelly Toroen     Test Name Result Flag Reference   Vit   B1, Whole Blood 100 6 nmol/L  66 5-200 0     (1) VITAMIN A 72Rky6465 10:36AM Franklin Barone     Test Name Result Flag Reference   Vitamin A, Serum 54 ug/dL  24-85

## 2018-01-16 NOTE — PROGRESS NOTES
Assessment    1  Obesity surgery status (V45 86) (Z98 84)   2  Diabetes mellitus type II, controlled (250 00) (E11 9)   3  Postgastrectomy malabsorption (579 3) (K91 2)   4  Secondary hyperparathyroidism (588 81) (N25 81)   5  Umbilical hernia (945 3) (K42 9)   6  Encounter for preventive health examination (V70 0) (Z00 00)    Discussion/Summary    Follow-up in 4 months  Follow diet as discussed  Get lab work done prior to your next office visit  Follow vitamin/mineral recommendations as reviewed with you  Exercise as tolerated  Call our office if you have any problems with abdominal pain especially if associated with fever, chills, nausea, vomiting, or any other concerns  1 s/p lap Shae-en-y gastric bypass surgery 2  umbilical hernia  42-UCNY-XWL male status post laparoscopic Shae-en-Y gastric bypass surgery with lysis of adhesions  May third 2011  By Dr Andrews Henley returns in followup to monitor his weight  He has a long-standing h/o periumbilical hernia and wants surgery on this at some point  The area has not caused him more than mild discomfort at times  He knows that the optimal time to get this repaired is after he loses weight  Goal pre-op hernia repair weight as per Dr Andrews Henley is 200 lb  I had advised him on his diet and had recommended further follow-up with RD at his last visit and offered him follow-up with RD again  He has lost 6 lb since I saw him last   I again advised him on his diet/gave him some suggestions to help with continued weight loss  He reports with his current work schedule it is difficult to meet with our RD now  A 24-hour diet recall was obtained from the patient and he is substituting protein drinks for 2 meals/day  I recommended that he have 2 meals and only one meal replacement shake but to increase his protein and vegetable and eat less of his starch at the meals to help with weight loss   He is active at his job but is not doing regular exercise-I encouraged him with increasing exercise as tolerated  He has maintained a 61% excess body weight loss at this point  3  Malabsorption-Pt is at risk for vitamin and mineral deficiencies secondary to the malabsorption and restriction of intakes  I reviewed  His current vitamin and mineral intake, and I advised him on the supplements  he is overdue to have his labs done from last year  I encouraged him to get labs done prior to his next visit so I can better advise him on his supplements  4  secondary hyperparathyroidism-he has been taking extra vitamin D-he indicates his PCP advised him not to take extra calcium -so will await these levels for further advice  I had advised him that if PTH remains high, I would like him to follow-up with ENDO for further management  5 DM-last hgA1c was 6% "pre-dm " range  -he remains off medication/followed by PCP Further weight loss may help  Chief Complaint  Patient in office today for 6 month weight monitor  He is not exercising as much and is taking his vitamins  Post-Op  Post-Op Bariatric Surgery:   Cami Ann is status post laparoscopic Shae-en-Y procedure, performed on 5/3/2011   with [facility] by Dr Andrews Henley  HPI: today's weight is 278 lb pounds, today's BMI is 39 3 and his total weight loss is 61% excess body weight loss pounds  The patient reports no nausea, no vomiting, no constipation, no diarrhea and no abdominal pain  Diet and Exercise: Diet history reviewed and discussed with the patient  Weight loss/gains to date discussed with the patient  Supplements: multivitamins and calcium  PE:   Abdominal exam: soft, nontender, no rebound tenderness, no guarding and no incisional hernia  + large periumbilical hernia   Assessment:   Post-op, the patient is progressing slowly  Plan: Activity restrictions: None     Instructions / Recommendations: dietary counseling recommended, recommended a daily protein intake of  grams, vitamin supplement(s) recommended, mineral supplement(s) recommended, recommended exercising at least 150 minutes per week, diet as advised and instructed to call the office for concerns, questions, or problems  The patient was instructed to follow up in 4 months  Review of Systems    Constitutional: planned weight loss, but not feeling poorly  Cardiovascular: no chest pain and no palpitations  Respiratory: no shortness of breath and no wheezing  Gastrointestinal: no abdominal pain, no nausea, no vomiting, no constipation and no diarrhea  Psychiatric: no anxiety and no depression  Active Problems    1  Abscess of skin (682 9) (L02 91)   2  Backache (724 5) (M54 9)   3  BRBPR (bright red blood per rectum) (569 3) (K62 5)   4  Constipation (564 00) (K59 00)   5  Contact dermatitis (692 9) (L25 9)   6  Diabetes mellitus type II, controlled (250 00) (E11 9)   7  Discoloration of skin (709 00) (L81 9)   8  Encounter for colorectal cancer screening (V76 51) (Z12 11,Z12 12)   9  Flu vaccine need (V04 81) (Z23)   10  Hypertension (401 9) (I10)   11  Impacted cerumen of both ears (380 4) (H61 23)   12  Internal hemorrhoid (455 0) (K64 8)   13  Obesity surgery status (V45 86) (Z98 84)   14  Penile cyst (607 89) (N48 89)   15  Postgastrectomy malabsorption (579 3) (K91 2)   16  Secondary hyperparathyroidism (588 81) (N25 81)   17  Umbilical hernia (083 1) (K42 9)   18  Vitamin D deficiency (268 9) (E55 9)    Social History    · Being A Social Drinker   · Denied: History of Drug Use   · Former smoker (V15 82) (C48 805)  The social history was reviewed and updated today  Current Meds   1  Anusol-HC 25 MG Rectal Suppository; INSERT 1 SUPPOSITORY RECTALLY 2 TIMES   DAILY; Therapy: 16Seu6250 to (Evaluate:19Hkm3999)  Requested for: 11Aug2015; Last   Rx:11Aug2015 Ordered   2  Blood Glucose Monitor System w/Device Kit; use as directed; Therapy: 81ETE9686 to (Last Rx:19Oct2015)  Requested for: 19Oct2015 Ordered   3   Calcium Citrate +D TABS Recorded   4  Docusate Sodium 100 MG Oral Capsule; Take 1 daily; Therapy: 11Aug2015 to (Evaluate:33Haz8424)  Requested for: 11Aug2015; Last   Rx:11Aug2015 Ordered   5  Multi-Vitamin TABS; Therapy: (Sandeep Goodman) to Recorded   6  Triamcinolone Acetonide 0 1 % External Cream; APPLY  AND RUB  IN A THIN FILM TO   AFFECTED AREAS TWICE DAILY  (AM AND PM); Therapy: 98KTD5558 to (Last Rx:35Frm6812)  Requested for: 84SSH2903 Ordered   7  Vitamin B-12 5000 MCG Sublingual Tablet Sublingual Recorded   8  Vitamin D3 06148 UNIT Oral Capsule; Therapy: (Recorded:29Jan2016) to Recorded    The medication list was reviewed and updated today  Allergies    1  No Known Drug Allergies    Vitals   Recorded: 88TSZ6182 01:36PM   Temperature 96 5 F   Heart Rate 58   Respiration 12   Systolic 608   Diastolic 88   Height 5 ft 10 5 in   Weight 278 lb    BMI Calculated 39 32   BSA Calculated 2 42     Physical Exam    Constitutional   General appearance: No acute distress, well appearing and well nourished  Eyes b/l conjunctiva without pallor  Ears, Nose, Mouth, and Throat oral mucosa moist    Pulmonary   Respiratory effort: No increased work of breathing or signs of respiratory distress  Auscultation of lungs: Clear to auscultation, equal breath sounds bilaterally, no wheezes, no rales, no rhonci  Cardiovascular   Auscultation of heart: Normal rate and rhythm, normal S1 and S2, without murmurs  Abdomen soft, no incisional hernias apprecaited; large umbilical hernia-reducible; NT to palpation     Musculoskeletal   Gait and station: Normal     Psychiatric   Orientation to person, place and time: Normal     Mood and affect: Normal          Future Appointments    Date/Time Provider Specialty Site   05/17/2016 11:00 AM Helen Viera, [facility] General Surgery [facility] [address]     Signatures   Electronically signed by : Yuri Laird, [facility]; Jan 29 2016  3:51PM EST                       (Author) Electronically signed by : FREEDOM Rubio ; Feb 4 2016  8:48AM EST                       (Validation)

## 2018-01-17 NOTE — RESULT NOTES
Verified Results  * CT ABDOMEN PELVIS W CONTRAST 41MDI1755 07:17AM Parrish Molina     Test Name Result Flag Reference   CT ABDOMEN PELVIS W CONTRAST (Report)     CT ABDOMEN AND PELVIS WITH IV CONTRAST     INDICATION: R10 9: Unspecified abdominal pain   Z98 84: Bariatric surgery status   K43 9: Ventral hernia without obstruction or gangrene  History taken directly from the electronic ordering system  COMPARISON: None  TECHNIQUE: CT examination of the abdomen and pelvis was performed  Reformatted images were created in axial, sagittal, and coronal planes  Radiation dose length product (DLP) for this visit: 6168 mGy-cm   This examination, like all CT scans performed in the [facility], was performed utilizing techniques to minimize radiation dose exposure, including the use of iterative    reconstruction and automated exposure control  IV Contrast: 100 mL of iohexol (OMNIPAQUE)      Enteric Contrast: Enteric contrast was not administered  FINDINGS:     ABDOMEN     LOWER CHEST: No significant abnormalities identified in the lower chest      LIVER/BILIARY TREE: Unremarkable  GALLBLADDER: No calcified gallstones  No pericholecystic inflammatory change  SPLEEN: Unremarkable  PANCREAS: Unremarkable  ADRENAL GLANDS: Unremarkable  KIDNEYS/URETERS: Parapelvic cysts are present  There are also hypoattenuating foci that are too small to characterize but likely represent cysts  STOMACH AND BOWEL: Postsurgical changes of gastric bypass surgery is noted  There is some mild thickening excluded portion of the stomach, which may related to underdistention  APPENDIX: No findings to suggest appendicitis  ABDOMINOPELVIC CAVITY: There is some focal mesenteric stranding adjacent to the periumbilical hernia defect, which likely represents focal fatty necrosis/omental infarct  No ascites or free intraperitoneal air  No lymphadenopathy       VESSELS: Unremarkable for patient's age  PELVIS     REPRODUCTIVE ORGANS: Unremarkable for patient's age  URINARY BLADDER: Unremarkable  ABDOMINAL WALL/INGUINAL REGIONS: Fat-containing periumbilical hernia is present  OSSEOUS STRUCTURES: No acute fracture or destructive osseous lesion  IMPRESSION:     Fat-containing periumbilical hernia  There is an area of likely fatty necrosis/omental infarct within the peritoneal cavity adjacent to the hernia defect  Postsurgical changes of gastric bypass surgery         Workstation performed: [id number]     Signed by:   Carlos Manuel Sebastian MD   11/1/17

## 2018-01-17 NOTE — MISCELLANEOUS
Provider Comments  Provider Comments:     Dear Krissy Short had a scheduled appointment at our office 5/17/2016 but were unable to keep  We attempted to call you back but were unable to reach you  It is very important that you follow up with us so that we can assess your physical and nutritional safety after your surgery  Please call our office at [phone] to reschedule your appointment       Sincerely,     [facility]              Signatures   Electronically signed by : Fabricio Hair, ; May 16 2016  9:47AM EST                       (Author)    Electronically signed by : Christian Galvez, [facility]; May 16 2016  4:08PM EST                       (Author)

## 2018-01-22 VITALS
WEIGHT: 291.5 LBS | RESPIRATION RATE: 20 BRPM | SYSTOLIC BLOOD PRESSURE: 134 MMHG | HEART RATE: 64 BPM | DIASTOLIC BLOOD PRESSURE: 70 MMHG | BODY MASS INDEX: 40.81 KG/M2 | HEIGHT: 71 IN | TEMPERATURE: 98.2 F

## 2018-01-24 ENCOUNTER — TELEPHONE (OUTPATIENT)
Dept: BARIATRICS | Facility: CLINIC | Age: 53
End: 2018-01-24

## 2018-03-14 ENCOUNTER — OFFICE VISIT (OUTPATIENT)
Dept: BARIATRICS | Facility: CLINIC | Age: 53
End: 2018-03-14

## 2018-03-14 ENCOUNTER — TELEPHONE (OUTPATIENT)
Dept: INTERNAL MEDICINE CLINIC | Facility: CLINIC | Age: 53
End: 2018-03-14

## 2018-03-14 VITALS — BODY MASS INDEX: 42.11 KG/M2 | HEIGHT: 71 IN | WEIGHT: 300.8 LBS

## 2018-03-14 VITALS — BODY MASS INDEX: 42.11 KG/M2 | WEIGHT: 300.8 LBS | HEIGHT: 71 IN

## 2018-03-14 DIAGNOSIS — E66.9 OBESITY, CLASS I, BMI 30-34.9: Primary | ICD-10-CM

## 2018-03-14 DIAGNOSIS — E66.01 OBESITY, CLASS III, BMI 40-49.9 (MORBID OBESITY) (HCC): Primary | ICD-10-CM

## 2018-03-14 DIAGNOSIS — E55.9 VITAMIN D DEFICIENCY: Primary | ICD-10-CM

## 2018-03-14 PROCEDURE — RECHECK

## 2018-03-14 PROCEDURE — RECHECK: Performed by: DIETITIAN, REGISTERED

## 2018-03-14 RX ORDER — ERGOCALCIFEROL 1.25 MG/1
50000 CAPSULE ORAL WEEKLY
Qty: 4 CAPSULE | Refills: 5 | Status: SHIPPED | OUTPATIENT
Start: 2018-03-14 | End: 2018-10-04 | Stop reason: SDUPTHER

## 2018-03-14 RX ORDER — ERGOCALCIFEROL 1.25 MG/1
50000 CAPSULE ORAL WEEKLY
Refills: 0 | COMMUNITY
Start: 2018-01-10 | End: 2018-03-14 | Stop reason: SDUPTHER

## 2018-03-14 NOTE — PROGRESS NOTES
has job that limits mobility  he is in need of healthy grab and go foods  he walks 14 city blocks 2x's   he has a high stress job  discussed multiple avenues to reduce stress  discussed different types of healthy grab and go food ideas  list was provided to patient   next appointment scheduled for 4/18/18 at 2pm

## 2018-03-14 NOTE — PROGRESS NOTES
Bariatric Follow Up Nutrition Note    Type of surgery  Gastric bypass: laparoscopic  Surgery Date: 5/3/2011  6 1/2  years  post-op  Surgeon: Dr Maverick Mann Lester  45 y o   male  Height 5' 10 5" (1 791 m), weight (!) 136 kg (300 lb 12 8 oz)  Body mass index is 42 55 kg/m²  Weight on Day of Weight Loss Surgery: 398 lb  Weight in (lb) to have BMI = 25: 186 6lb  Pre-Op Excess Wt: 211 4 lb  Post-Op Wt Loss: 117 7#/ 51% EBWL in 6 1/2 year(s)    Review of History and Medications   No past medical history on file  No past surgical history on file    Social History     Social History    Marital status: /Civil Union     Spouse name: N/A    Number of children: N/A    Years of education: N/A     Social History Main Topics    Smoking status: Not on file    Smokeless tobacco: Not on file    Alcohol use Not on file    Drug use: Unknown    Sexual activity: Not on file     Other Topics Concern    Not on file     Social History Narrative    No narrative on file       Current Outpatient Prescriptions:     ergocalciferol (VITAMIN D2) 50,000 units, Take 1 capsule (50,000 Units total) by mouth once a week, Disp: 4 capsule, Rfl: 5    Food Intake and Lifestyle Assessment   Food Intake Assessment completed via usual diet recall  Breakfast: oatmeal  Snack: fruit   Lunch: either goes out to store or tuna fish  Snack: fruit/pretzels  Dinner: grabs on the go- tired works 16  Hour shifts  Snack: 0  Beverage intake: water  Diet texture/stage: regular  Protein supplement: none at present, has in the past  Estimated protein intake per day: 90 grams   Estimated fluid intake per day: 64 ounces or more   Meals eaten away from home: 5 x per week   Typical meal pattern: 3 meals per day and 2-3 snacks per day  Eating Behaviors: Consumption of high calorie/ high fat foods, Large portion sizes, Frequent snacking/ grazing and Mindless eating    Food allergies or intolerances: none noted Cultural or Zoroastrianism considerations: n/a    Physical Assessment  Nutrition Related Findings  Return of Hunger and weight regain from brina weight of 276 lbs ( 25 pounds regained)    Physical Activity  Types of exercise: Walking  Walks to and from the [employer] in [place] to work- about 10 to 15 minutes or 18 blocks each way  Current physical limitations: some back pain     BMR per [address] [id number]  Estimated calorie needs for weight loss 1900 kcal ( 750 kcal deficit) 95 grams of protein (20% ) of calorie       Psychosocial Assessment   Support systems: spouse and children  Socioeconomic factors: n/a    Nutrition Diagnosis  Diagnosis: unintentional weight gain   Related to: Physical inactivity and Excessive energy intake  As Evidenced by: weight regain of 25 pounds since surgery      Interventions and Teaching   Patient educated on post-op nutrition guidelines  Patient educated and handouts provided    Adequate hydration  Exercise  Nutrition considerations after surgery  Protein supplements  Meal planning and preparation  Appropriate carbohydrate, protein, and fat intake, and food/fluid choices to maximize safe weight loss, nutrient intake, and tolerance   Techniques for self monitoring and keeping daily food journal  Vitamin / Mineral supplementation of Multivitamin with minerals, Vitamin B12 and Vitamin D- patient is taking a large enteric coated calcium pill    Reviewed labs, PTH slightly elevated, recommended that he switch to a chewable calcium carbonate supplement with meals     Education provided to: patient    Barriers to learning: No barriers identified    Readiness to change: action    Comprehension: demonstrated understanding     Expected Compliance: good    Goals  Food journal, Eliminate mindless snacking and food log onto [employer] 1900 calorie, 95 grams of protein, pack lunch , drink a shake for dinner ( too tired to cook dinner at night) provided with [employer] protein samples     Time Spent:   30 Minutes patient also had f/u with LCSW to address his behavioral eating

## 2018-03-15 DIAGNOSIS — Z98.84 WEIGHT GAIN FOLLOWING GASTRIC BYPASS SURGERY: ICD-10-CM

## 2018-03-15 DIAGNOSIS — E11.9 CONTROLLED TYPE 2 DIABETES MELLITUS WITHOUT COMPLICATION, UNSPECIFIED LONG TERM INSULIN USE STATUS: ICD-10-CM

## 2018-03-15 DIAGNOSIS — I10 HYPERTENSION, UNSPECIFIED TYPE: ICD-10-CM

## 2018-03-15 DIAGNOSIS — Z12.5 SCREENING FOR PROSTATE CANCER: ICD-10-CM

## 2018-03-15 DIAGNOSIS — E21.1 SECONDARY NON-RENAL HYPERPARATHYROIDISM (HCC): Primary | ICD-10-CM

## 2018-03-15 DIAGNOSIS — R63.5 WEIGHT GAIN FOLLOWING GASTRIC BYPASS SURGERY: ICD-10-CM

## 2018-03-15 DIAGNOSIS — E55.9 VITAMIN D DEFICIENCY: ICD-10-CM

## 2018-03-15 PROBLEM — R21 RASH: Status: ACTIVE | Noted: 2017-01-05

## 2018-03-15 PROBLEM — K43.9 VENTRAL HERNIA: Status: ACTIVE | Noted: 2017-10-27

## 2018-04-06 LAB
ALBUMIN SERPL-MCNC: 3.8 G/DL (ref 3.5–5.5)
ALBUMIN/CREAT UR: 2.1 MG/G CREAT (ref 0–30)
ALBUMIN/GLOB SERPL: 1.5 {RATIO} (ref 1.2–2.2)
ALP SERPL-CCNC: 73 IU/L (ref 39–117)
ALT SERPL-CCNC: 19 IU/L (ref 0–44)
AST SERPL-CCNC: 17 IU/L (ref 0–40)
BASOPHILS # BLD AUTO: 0 X10E3/UL (ref 0–0.2)
BASOPHILS NFR BLD AUTO: 1 %
BILIRUB SERPL-MCNC: 0.3 MG/DL (ref 0–1.2)
BUN SERPL-MCNC: 11 MG/DL (ref 6–24)
BUN/CREAT SERPL: 14 (ref 9–20)
CALCIUM SERPL-MCNC: 8.5 MG/DL (ref 8.7–10.2)
CHLORIDE SERPL-SCNC: 105 MMOL/L (ref 96–106)
CHOLEST SERPL-MCNC: 140 MG/DL (ref 100–199)
CO2 SERPL-SCNC: 24 MMOL/L (ref 18–29)
CREAT SERPL-MCNC: 0.81 MG/DL (ref 0.76–1.27)
CREAT UR-MCNC: 187.4 MG/DL
EOSINOPHIL # BLD AUTO: 0.2 X10E3/UL (ref 0–0.4)
EOSINOPHIL NFR BLD AUTO: 3 %
ERYTHROCYTE [DISTWIDTH] IN BLOOD BY AUTOMATED COUNT: 13.6 % (ref 12.3–15.4)
EST. AVERAGE GLUCOSE BLD GHB EST-MCNC: 120 MG/DL
GLOBULIN SER-MCNC: 2.6 G/DL (ref 1.5–4.5)
GLUCOSE SERPL-MCNC: 93 MG/DL (ref 65–99)
HBA1C MFR BLD: 5.8 % (ref 4.8–5.6)
HCT VFR BLD AUTO: 40.7 % (ref 37.5–51)
HDLC SERPL-MCNC: 57 MG/DL
HGB BLD-MCNC: 13.3 G/DL (ref 13–17.7)
IMM GRANULOCYTES # BLD: 0 X10E3/UL (ref 0–0.1)
IMM GRANULOCYTES NFR BLD: 0 %
IRON SATN MFR SERPL: 19 % (ref 15–55)
IRON SERPL-MCNC: 66 UG/DL (ref 38–169)
LDLC SERPL CALC-MCNC: 73 MG/DL (ref 0–99)
LYMPHOCYTES # BLD AUTO: 2.4 X10E3/UL (ref 0.7–3.1)
LYMPHOCYTES NFR BLD AUTO: 37 %
MCH RBC QN AUTO: 30.3 PG (ref 26.6–33)
MCHC RBC AUTO-ENTMCNC: 32.7 G/DL (ref 31.5–35.7)
MCV RBC AUTO: 93 FL (ref 79–97)
MICROALBUMIN UR-MCNC: 4 UG/ML
MONOCYTES # BLD AUTO: 0.5 X10E3/UL (ref 0.1–0.9)
MONOCYTES NFR BLD AUTO: 8 %
NEUTROPHILS # BLD AUTO: 3.3 X10E3/UL (ref 1.4–7)
NEUTROPHILS NFR BLD AUTO: 51 %
PLATELET # BLD AUTO: 234 X10E3/UL (ref 150–379)
POTASSIUM SERPL-SCNC: 4 MMOL/L (ref 3.5–5.2)
PROT SERPL-MCNC: 6.4 G/DL (ref 6–8.5)
PSA FREE MFR SERPL: 20 %
PSA FREE SERPL-MCNC: 0.06 NG/ML
PSA SERPL-MCNC: 0.3 NG/ML (ref 0–4)
RBC # BLD AUTO: 4.39 X10E6/UL (ref 4.14–5.8)
SL AMB EGFR AFRICAN AMERICAN: 116 ML/MIN/1.73
SL AMB EGFR NON AFRICAN AMERICAN: 101 ML/MIN/1.73
SODIUM SERPL-SCNC: 143 MMOL/L (ref 134–144)
TIBC SERPL-MCNC: 350 UG/DL (ref 250–450)
TRIGL SERPL-MCNC: 48 MG/DL (ref 0–149)
TSH SERPL DL<=0.005 MIU/L-ACNC: 2.37 UIU/ML (ref 0.45–4.5)
UIBC SERPL-MCNC: 284 UG/DL (ref 111–343)
VIT B12 SERPL-MCNC: 379 PG/ML (ref 232–1245)
WBC # BLD AUTO: 6.4 X10E3/UL (ref 3.4–10.8)

## 2018-04-11 ENCOUNTER — OFFICE VISIT (OUTPATIENT)
Dept: INTERNAL MEDICINE CLINIC | Facility: CLINIC | Age: 53
End: 2018-04-11
Payer: COMMERCIAL

## 2018-04-11 VITALS
DIASTOLIC BLOOD PRESSURE: 70 MMHG | OXYGEN SATURATION: 97 % | WEIGHT: 305 LBS | TEMPERATURE: 96.2 F | HEART RATE: 56 BPM | SYSTOLIC BLOOD PRESSURE: 120 MMHG | HEIGHT: 71 IN | RESPIRATION RATE: 20 BRPM | BODY MASS INDEX: 42.7 KG/M2

## 2018-04-11 DIAGNOSIS — E21.1 SECONDARY NON-RENAL HYPERPARATHYROIDISM (HCC): ICD-10-CM

## 2018-04-11 DIAGNOSIS — I10 HYPERTENSION, UNSPECIFIED TYPE: ICD-10-CM

## 2018-04-11 DIAGNOSIS — Z90.3 POSTGASTRECTOMY MALABSORPTION: ICD-10-CM

## 2018-04-11 DIAGNOSIS — E55.9 VITAMIN D DEFICIENCY: ICD-10-CM

## 2018-04-11 DIAGNOSIS — Z12.5 SCREENING FOR PROSTATE CANCER: ICD-10-CM

## 2018-04-11 DIAGNOSIS — E11.9 CONTROLLED TYPE 2 DIABETES MELLITUS WITHOUT COMPLICATION, UNSPECIFIED WHETHER LONG TERM INSULIN USE (HCC): Primary | ICD-10-CM

## 2018-04-11 DIAGNOSIS — E51.9 VITAMIN B1 DEFICIENCY: ICD-10-CM

## 2018-04-11 DIAGNOSIS — K91.2 POSTGASTRECTOMY MALABSORPTION: ICD-10-CM

## 2018-04-11 PROBLEM — R79.89 HIGH SERUM PARATHYROID HORMONE (PTH): Status: ACTIVE | Noted: 2018-04-11

## 2018-04-11 PROBLEM — L43.9 LICHEN PLANUS: Status: ACTIVE | Noted: 2017-01-09

## 2018-04-11 PROBLEM — L82.0 SEBORRHEIC KERATOSES, INFLAMED: Status: ACTIVE | Noted: 2017-01-09

## 2018-04-11 PROBLEM — R10.9 ABDOMINAL PAIN: Status: ACTIVE | Noted: 2017-10-27

## 2018-04-11 PROCEDURE — 99214 OFFICE O/P EST MOD 30 MIN: CPT | Performed by: INTERNAL MEDICINE

## 2018-04-11 PROCEDURE — 3078F DIAST BP <80 MM HG: CPT | Performed by: INTERNAL MEDICINE

## 2018-04-11 PROCEDURE — 3074F SYST BP LT 130 MM HG: CPT | Performed by: INTERNAL MEDICINE

## 2018-04-11 RX ORDER — ACETAMINOPHEN 160 MG
1 TABLET,DISINTEGRATING ORAL DAILY
COMMUNITY
Start: 2017-06-07

## 2018-04-11 RX ORDER — CLOBETASOL PROPIONATE 0.5 MG/G
OINTMENT TOPICAL
COMMUNITY
End: 2019-04-25 | Stop reason: SDUPTHER

## 2018-04-11 RX ORDER — CHLORAL HYDRATE 500 MG
CAPSULE ORAL
COMMUNITY
End: 2021-02-18

## 2018-04-11 RX ORDER — MULTIVITAMIN
TABLET ORAL
COMMUNITY

## 2018-04-11 RX ORDER — DOCUSATE SODIUM 100 MG/1
CAPSULE, LIQUID FILLED ORAL DAILY
COMMUNITY
Start: 2015-08-11 | End: 2021-02-18

## 2018-04-11 NOTE — PROGRESS NOTES
Assessment/Plan:    1  Very minimally elevated PTH with a normal calcium will check ionized calcium PTH and vitamin-D level in 6 months if abnormal may consider referring to endocrinology  2  History of bariatric surgery will check vitamin levels  3  Type 2 diabetes A1c is controlled at goal recheck in 6 months  4  Blood pressure at is at goal without medication  5  Obesity diet lifestyle stressed patient does valve to lose weight by his next visit  6  Family history of cancer not sure of the type he will be talking to his sister's to find out         Diagnoses and all orders for this visit:    Controlled type 2 diabetes mellitus without complication, unspecified whether long term insulin use ([facility] [address] )  -     Cancel: PTH, intact; Future  -     CBC and differential; Future  -     Comprehensive metabolic panel; Future  -     TSH, 3rd generation with T4 reflex; Future  -     PTH, intact; Future  -     Calcium, ionized; Future  -     Vitamin A; Future  -     Vitamin B1, whole blood; Future  -     Vitamin B12; Future  -     Folate; Future  -     Ferritin; Future  -     Copper Level; Future  -     Iron Saturation %; Future  -     Vitamin D 25 hydroxy; Future  -     Vitamin D 25 hydroxy; Future  -     HEMOGLOBIN A1C W/ EAG ESTIMATION; Future  -     Lipid Panel with Direct LDL reflex; Future    Hypertension, unspecified type  -     Cancel: PTH, intact; Future  -     CBC and differential; Future  -     Comprehensive metabolic panel; Future  -     TSH, 3rd generation with T4 reflex; Future  -     PTH, intact; Future  -     Calcium, ionized; Future  -     Vitamin A; Future  -     Vitamin B1, whole blood; Future  -     Vitamin B12; Future  -     Folate; Future  -     Ferritin; Future  -     Copper Level; Future  -     Iron Saturation %; Future  -     Vitamin D 25 hydroxy; Future  -     Vitamin D 25 hydroxy; Future  -     HEMOGLOBIN A1C W/ EAG ESTIMATION;  Future  -     Lipid Panel with Direct LDL reflex; Future    Postgastrectomy malabsorption  -     Cancel: PTH, intact; Future  -     CBC and differential; Future  -     Comprehensive metabolic panel; Future  -     TSH, 3rd generation with T4 reflex; Future  -     PTH, intact; Future  -     Calcium, ionized; Future  -     Vitamin A; Future  -     Vitamin B1, whole blood; Future  -     Vitamin B12; Future  -     Folate; Future  -     Ferritin; Future  -     Copper Level; Future  -     Iron Saturation %; Future  -     Vitamin D 25 hydroxy; Future  -     Vitamin D 25 hydroxy; Future  -     HEMOGLOBIN A1C W/ EAG ESTIMATION; Future    Vitamin D deficiency  -     Cancel: PTH, intact; Future  -     CBC and differential; Future  -     Comprehensive metabolic panel; Future  -     TSH, 3rd generation with T4 reflex; Future  -     PTH, intact; Future  -     Calcium, ionized; Future  -     Vitamin A; Future  -     Vitamin B1, whole blood; Future  -     Vitamin B12; Future  -     Folate; Future  -     Ferritin; Future  -     Copper Level; Future  -     Iron Saturation %; Future  -     Vitamin D 25 hydroxy; Future  -     Vitamin D 25 hydroxy; Future  -     HEMOGLOBIN A1C W/ EAG ESTIMATION; Future    Vitamin B1 deficiency  -     Cancel: PTH, intact; Future  -     CBC and differential; Future  -     Comprehensive metabolic panel; Future  -     TSH, 3rd generation with T4 reflex; Future  -     PTH, intact; Future  -     Calcium, ionized; Future  -     Vitamin A; Future  -     Vitamin B1, whole blood; Future  -     Vitamin B12; Future  -     Folate; Future  -     Ferritin; Future  -     Copper Level; Future  -     Iron Saturation %; Future  -     Vitamin D 25 hydroxy; Future  -     Vitamin D 25 hydroxy; Future  -     HEMOGLOBIN A1C W/ EAG ESTIMATION; Future    Secondary non-renal hyperparathyroidism (HCC)  -     Cancel: PTH, intact; Future  -     CBC and differential; Future  -     Comprehensive metabolic panel; Future  -     TSH, 3rd generation with T4 reflex;  Future  -     PTH, intact; Future  -     Calcium, ionized; Future  -     Vitamin A; Future  -     Vitamin B1, whole blood; Future  -     Vitamin B12; Future  -     Folate; Future  -     Ferritin; Future  -     Copper Level; Future  -     Iron Saturation %; Future  -     Vitamin D 25 hydroxy; Future  -     Vitamin D 25 hydroxy; Future  -     HEMOGLOBIN A1C W/ EAG ESTIMATION; Future    Screening for prostate cancer  -     PSA Total (Reflex To Free); Future    Other orders  -     Calcium Citrate-Vitamin D (CALCIUM CITRATE + D) 250-200 MG-UNIT TABS; Take by mouth  -     clobetasol (TEMOVATE) 0 05 % ointment; Apply topically  -     docusate sodium (COLACE) 100 mg capsule; Take by mouth daily  -     econazole nitrate 1 % cream; Apply topically 2 (two) times a day  -     Multiple Vitamin (MULTI-VITAMIN DAILY) TABS; Take by mouth  -     Omega-3 1000 MG CAPS; Take by mouth  -     Cyanocobalamin (VITAMIN B-12) 5000 MCG SUBL; Place under the tongue  -     Cholecalciferol (VITAMIN D3) 2000 units capsule; Take 1 tablet by mouth daily        The patient was counseled regarding instructions for management, risk factor reductions, patient and family education,impressions, risks and benefits of treatment options, side effects of medications, importance of compliance with treatment  The treatment plan was reviewed with the patient/guardian and patient/guardian understands and agrees with the treatment plan  Subjective:      Patient ID: Elio Balbuena is a 45 y o  male  HPI    The following portions of the patient's history were reviewed and updated as appropriate:   He has a past medical history of Arthritis; Morbid obesity due to excess calories ([address] ); Type 2 diabetes mellitus ([facility] [address] ); Vitamin B1 deficiency; and Vitamin D deficiency  ,   does not have any pertinent problems on file  ,   has a past surgical history that includes Gastric bypass and Hernia repair  ,  family history includes Bone cancer in his father; Heart failure in his mother; Throat cancer in his father  ,   reports that he quit smoking about 8 years ago  He has never used smokeless tobacco  He reports that he drinks alcohol  He reports that he does not use drugs  ,  has No Known Allergies  Review of Systems   Constitutional: Negative for appetite change, chills, fatigue, fever and unexpected weight change  HENT: Negative for congestion, ear pain, facial swelling, hearing loss, mouth sores, nosebleeds, postnasal drip, rhinorrhea, sinus pain, sore throat, trouble swallowing and voice change  Eyes: Negative for pain, discharge, redness and visual disturbance  Respiratory: Negative for apnea, chest tightness, shortness of breath, wheezing and stridor  Cardiovascular: Negative for chest pain, palpitations and leg swelling  Gastrointestinal: Negative for abdominal distention, abdominal pain, blood in stool, constipation, diarrhea and vomiting  Endocrine: Negative for cold intolerance, heat intolerance, polydipsia, polyphagia and polyuria  Genitourinary: Negative for difficulty urinating, dysuria, flank pain, frequency, genital sores, hematuria and urgency  Musculoskeletal: Negative for arthralgias and back pain  Skin: Negative for rash and wound  Allergic/Immunologic: Negative for environmental allergies, food allergies and immunocompromised state  Neurological: Negative for dizziness, tremors, seizures, syncope, facial asymmetry, speech difficulty, weakness, light-headedness, numbness and headaches  Hematological: Negative for adenopathy  Does not bruise/bleed easily  Psychiatric/Behavioral: Negative for agitation, behavioral problems, dysphoric mood, hallucinations, self-injury, sleep disturbance and suicidal ideas  The patient is not hyperactive  Objective:     Physical Exam   Constitutional: He is oriented to person, place, and time  He appears well-developed     obesity   HENT:   Right Ear: External ear normal    Left Ear: External ear normal  Eyes: Right eye exhibits no discharge  Left eye exhibits no discharge  No scleral icterus  Neck: Carotid bruit is not present  No tracheal deviation present  No thyroid mass and no thyromegaly present  Cardiovascular: Normal rate, regular rhythm, normal heart sounds and intact distal pulses  Exam reveals no gallop and no friction rub  No murmur heard  Pulses:       Dorsalis pedis pulses are 1+ on the right side, and 1+ on the left side  Posterior tibial pulses are 1+ on the right side, and 1+ on the left side  Pulmonary/Chest: No respiratory distress  He has no wheezes  He has no rales  Musculoskeletal: He exhibits no edema  Feet:   Right Foot:   Skin Integrity: Negative for ulcer, skin breakdown, erythema, warmth, callus or dry skin  Left Foot:   Skin Integrity: Negative for ulcer, skin breakdown, erythema, warmth, callus or dry skin  Lymphadenopathy:     He has no cervical adenopathy  Neurological: He is alert and oriented to person, place, and time  Coordination normal    Psychiatric: He has a normal mood and affect  His behavior is normal  Judgment and thought content normal    Nursing note and vitals reviewed  Patient's shoes and socks removed  Right Foot/Ankle   Right Foot Inspection  Skin Exam: skin normal and skin intact no dry skin, no warmth, no callus, no erythema, no maceration, no abnormal color, no pre-ulcer, no ulcer and no callus                          Toe Exam: no swelling, no tenderness, erythema and  no right toe deformity  Sensory   Vibration: intact  Proprioception: intact   Monofilament testing: intact  Vascular    The right DP pulse is 1+  The right PT pulse is 1+     Right Toe  - Comprehensive Exam  Ecchymosis: none  Swelling: none   Tenderness: none         Left Foot/Ankle  Left Foot Inspection  Skin Exam: skin normal and skin intactno dry skin, no warmth, no erythema, no maceration, normal color, no pre-ulcer, no ulcer and no callus Toe Exam: no swelling, no tenderness, no erythema and no left toe deformity                   Sensory   Vibration: intact  Proprioception: intact  Monofilament: intact  Vascular    The left DP pulse is 1+  The left PT pulse is 1+     Left Toe  - Comprehensive Exam  Ecchymosis: none  Swelling: none   Tenderness: none       Assign Risk Category:  No deformity present; ;        Risk: 0

## 2018-04-11 NOTE — PATIENT INSTRUCTIONS
Diabetes in the Older Adult   WHAT YOU NEED TO KNOW:   What do I need to know if I am an older adult with diabetes? Older adults with diabetes are at risk for heart disease, stroke, kidney disease, blindness, and nerve damage  You may also be at risk for any of the following:  · Poor nutrition or low blood sugar levels    · Confusion or problems with memory, attention, or learning new things    · Trouble controlling urination or frequent urinary tract infections    · Trouble with coordination or balance    · Falls and injuries    · Pain    · Depression    · Open sores on your legs or feet  What are the ABCs of diabetes? The ABCs stand for certain things you can do to manage or prevent problems caused by diabetes:  · A  stands for A1c test   This test shows the average amount of sugar in your blood over the past 2 to 3 months  High levels of sugar in your blood can cause damage to your heart, blood vessels, kidneys, feet, and eyes  Most older adults with diabetes should have an A1c level less than 7 5  Ask your healthcare provider if this A1c goal is right for you  Your provider can help you make changes if your A1c is too high  · B  stands for blood pressure   High blood pressure can increase your risk for a heart attack, stroke, or kidney disease  Most older adults with diabetes should have a systolic blood pressure (first number) of 140  Your diastolic blood pressure (second number) should be below 90  Ask your healthcare provider if these blood pressure goals are right for you  · C  stands for cholesterol   High levels of cholesterol can block your arteries and cause a heart attack or stroke  Ask your healthcare provider what your cholesterol levels should be  · S  stands for stop smoking   Nicotine and other chemicals in cigarettes and cigars can cause lung damage and make it more difficult to manage your diabetes  What can I do to manage the ABCs and prevent problems caused by diabetes?    · Check your blood sugar levels as directed  Your healthcare provider will tell you when and how often to check during the day  Your healthcare provider will also tell you what your blood sugar levels should be before and after a meal  You may need to check for ketones in your urine or blood if your level is higher than directed  Write down your results and show them to your healthcare provider  Your provider may use the results to make changes to your medicine, food, or exercise schedules  Ask your healthcare provider for more information about how to treat a high or low blood sugar level  · Follow your meal plan as directed  A dietitian will help you make a meal plan to keep your blood sugar level steady and make sure you get enough nutrition  Do not skip meals  Your blood sugar level may drop too low if you have taken diabetes medicine and do not eat  Ask your healthcare provider about programs in your community that can deliver the meals to your home  · Try to be active for 30 to 60 minutes most days of the week  Exercise can help keep your blood sugar level steady, decrease your risk of heart disease, and help you lose weight  It can also help improve your balance and decrease your risk for falls  Work with your healthcare provider to create an exercise plan  Ask a family member or friend to exercise with you  Start slow and exercise for 5 to 10 minutes at a time  Examples of activities include walking or swimming  Include muscle strengthening activities 2 to 3 days each week  Include balance training 2 to 3 times each week  Activities that help increase balance include yoga and andreina chi      · Maintain a healthy weight  Ask your healthcare provider how much you should weigh  A healthy weight can help you control your diabetes and prevent heart disease  Ask your provider to help you create a weight loss plan if you are overweight  Together you can set manageable weight loss goals  · Do not smoke  Ask your healthcare provider for information if you currently smoke and need help to quit  Do not use e-cigarettes or smokeless tobacco in place of cigarettes or to help you quit  They still contain nicotine  · Manage stress  Stress may increase your blood sugar level  Deep breathing, muscle relaxation, and music may help you relax  Ask your healthcare provider for more information about these practices  What else can I do to manage my diabetes? · Check your feet every day for sores  Look at your whole foot, including the bottom, and between and under your toes  Check for wounds, corns, and calluses  Use a mirror to see the bottom of your feet  The skin on your feet may be shiny, tight, dry, or darker than normal  Your feet may also be cold and pale  Feel your feet by running your hands along the tops, bottoms, sides, and between your toes  Redness, swelling, and warmth are signs of blood flow problems that can lead to a foot ulcer  Do not try to remove corns or calluses yourself  · Wear medical alert identification  Wear medical alert jewelry or carry a card that says you have diabetes  Ask your healthcare provider where to get these items  · Ask about vaccines  You have a higher risk for serious illness if you get the flu, pneumonia, or hepatitis  Ask your healthcare provider if you should get a flu, pneumonia, shingles, or hepatitis B vaccine, and when to get the vaccine  · Keep all appointments  You may need to return to have your A1c checked every 3 months  You will need to return at least once each year to have your feet checked  You will need an eye exam once a year to check for retinopathy  You will also need urine tests every year to check for kidney problems  You may need tests to monitor for heart disease  Write down your questions so you remember to ask them during your visits  · Get help from family and friends    You may need help checking your blood sugar level, giving insulin injections, or preparing your meals  Ask your family and friends to help you with these tasks  Talk to your healthcare provider if you do not have someone at home to help you  A healthcare provider can come to your home to help you with these tasks  Call 911 for any of the following:   · You have any of the following signs of a stroke:      ¨ Numbness or drooping on one side of your face     ¨ Weakness in an arm or leg    ¨ Confusion or difficulty speaking    ¨ Dizziness, a severe headache, or vision loss    · You have any of the following signs of a heart attack:      ¨ Squeezing, pressure, or pain in your chest that lasts longer than 5 minutes or returns    ¨ Discomfort or pain in your back, neck, jaw, stomach, or arm     ¨ Trouble breathing    ¨ Nausea or vomiting    ¨ Lightheadedness or a sudden cold sweat, especially with chest pain or trouble breathing  When should I seek immediate care? · You have severe abdominal pain, or the pain spreads to your back  You may also be vomiting  · You have trouble staying awake or focusing  · You are shaking or sweating  · You have blurred or double vision  · Your breath has a fruity, sweet smell  · Your breathing is deep and labored, or rapid and shallow  · Your heartbeat is fast and weak  · You fall and get hurt  When should I contact my healthcare provider? · You are vomiting or have diarrhea  · You have an upset stomach and cannot eat the foods on your meal plan  · You feel weak or more tired than usual      · You feel dizzy, have headaches, or are easily irritated  · Your skin is red, warm, dry, or swollen  · You have a wound that does not heal      · You have numbness in your arms or legs  · You have trouble coping with your illness, or you feel anxious or depressed  · You have problems with your memory  · You have changes in your vision       · You have questions or concerns about your condition or care  CARE AGREEMENT:   You have the right to help plan your care  Learn about your health condition and how it may be treated  Discuss treatment options with your caregivers to decide what care you want to receive  You always have the right to refuse treatment  The above information is an  only  It is not intended as medical advice for individual conditions or treatments  Talk to your doctor, nurse or pharmacist before following any medical regimen to see if it is safe and effective for you  © 2017 [employer]  Information is for End User's use only and may not be sold, redistributed or otherwise used for commercial purposes  All illustrations and images included in CareNotes® are the copyrighted property of [employer]  or [employer]

## 2018-10-04 DIAGNOSIS — E55.9 VITAMIN D DEFICIENCY: ICD-10-CM

## 2018-10-04 RX ORDER — ERGOCALCIFEROL 1.25 MG/1
CAPSULE ORAL
Qty: 4 CAPSULE | Refills: 5 | Status: SHIPPED | OUTPATIENT
Start: 2018-10-04 | End: 2018-11-07 | Stop reason: SDUPTHER

## 2018-10-31 LAB — HBA1C MFR BLD HPLC: 6 %

## 2018-11-05 LAB
25(OH)D3+25(OH)D2 SERPL-MCNC: 19.9 NG/ML (ref 30–100)
ALBUMIN SERPL-MCNC: 3.9 G/DL (ref 3.5–5.5)
ALBUMIN/GLOB SERPL: 1.6 {RATIO} (ref 1.2–2.2)
ALP SERPL-CCNC: 76 IU/L (ref 39–117)
ALT SERPL-CCNC: 18 IU/L (ref 0–44)
AST SERPL-CCNC: 17 IU/L (ref 0–40)
BASOPHILS # BLD AUTO: 0.1 X10E3/UL (ref 0–0.2)
BASOPHILS NFR BLD AUTO: 1 %
BILIRUB SERPL-MCNC: 0.3 MG/DL (ref 0–1.2)
BUN SERPL-MCNC: 11 MG/DL (ref 6–24)
BUN/CREAT SERPL: 14 (ref 9–20)
CA-I SERPL ISE-MCNC: 5.2 MG/DL (ref 4.5–5.6)
CALCIUM SERPL-MCNC: 8.5 MG/DL (ref 8.7–10.2)
CHLORIDE SERPL-SCNC: 106 MMOL/L (ref 96–106)
CHOLEST SERPL-MCNC: 131 MG/DL (ref 100–199)
CO2 SERPL-SCNC: 24 MMOL/L (ref 20–29)
COPPER SERPL-MCNC: 103 UG/DL (ref 72–166)
CREAT SERPL-MCNC: 0.76 MG/DL (ref 0.76–1.27)
EOSINOPHIL # BLD AUTO: 0.2 X10E3/UL (ref 0–0.4)
EOSINOPHIL NFR BLD AUTO: 3 %
ERYTHROCYTE [DISTWIDTH] IN BLOOD BY AUTOMATED COUNT: 13.8 % (ref 12.3–15.4)
EST. AVERAGE GLUCOSE BLD GHB EST-MCNC: 126 MG/DL
FERRITIN SERPL-MCNC: 34 NG/ML (ref 30–400)
FOLATE SERPL-MCNC: 4.6 NG/ML
GLOBULIN SER-MCNC: 2.4 G/DL (ref 1.5–4.5)
GLUCOSE SERPL-MCNC: 88 MG/DL (ref 65–99)
HBA1C MFR BLD: 6 % (ref 4.8–5.6)
HCT VFR BLD AUTO: 39.5 % (ref 37.5–51)
HDLC SERPL-MCNC: 50 MG/DL
HGB BLD-MCNC: 12.7 G/DL (ref 13–17.7)
IMM GRANULOCYTES # BLD: 0 X10E3/UL (ref 0–0.1)
IMM GRANULOCYTES NFR BLD: 0 %
IRON SATN MFR SERPL: 23 % (ref 15–55)
IRON SERPL-MCNC: 87 UG/DL (ref 38–169)
LDLC SERPL CALC-MCNC: 72 MG/DL (ref 0–99)
LYMPHOCYTES # BLD AUTO: 2.4 X10E3/UL (ref 0.7–3.1)
LYMPHOCYTES NFR BLD AUTO: 42 %
MCH RBC QN AUTO: 29.5 PG (ref 26.6–33)
MCHC RBC AUTO-ENTMCNC: 32.2 G/DL (ref 31.5–35.7)
MCV RBC AUTO: 92 FL (ref 79–97)
MONOCYTES # BLD AUTO: 0.5 X10E3/UL (ref 0.1–0.9)
MONOCYTES NFR BLD AUTO: 9 %
NEUTROPHILS # BLD AUTO: 2.5 X10E3/UL (ref 1.4–7)
NEUTROPHILS NFR BLD AUTO: 45 %
PLATELET # BLD AUTO: 225 X10E3/UL (ref 150–379)
POTASSIUM SERPL-SCNC: 4.2 MMOL/L (ref 3.5–5.2)
PROT SERPL-MCNC: 6.3 G/DL (ref 6–8.5)
PTH-INTACT SERPL-MCNC: 79 PG/ML (ref 15–65)
RBC # BLD AUTO: 4.3 X10E6/UL (ref 4.14–5.8)
SL AMB EGFR AFRICAN AMERICAN: 119 ML/MIN/1.73
SL AMB EGFR NON AFRICAN AMERICAN: 103 ML/MIN/1.73
SODIUM SERPL-SCNC: 142 MMOL/L (ref 134–144)
TIBC SERPL-MCNC: 373 UG/DL (ref 250–450)
TRIGL SERPL-MCNC: 43 MG/DL (ref 0–149)
TSH SERPL DL<=0.005 MIU/L-ACNC: 1.93 UIU/ML (ref 0.45–4.5)
UIBC SERPL-MCNC: 286 UG/DL (ref 111–343)
VIT A SERPL-MCNC: 23.4 UG/DL (ref 33.1–100)
VIT B1 BLD-SCNC: 79 NMOL/L (ref 66.5–200)
VIT B12 SERPL-MCNC: 304 PG/ML (ref 232–1245)
WBC # BLD AUTO: 5.7 X10E3/UL (ref 3.4–10.8)

## 2018-11-07 ENCOUNTER — OFFICE VISIT (OUTPATIENT)
Dept: INTERNAL MEDICINE CLINIC | Facility: CLINIC | Age: 53
End: 2018-11-07
Payer: COMMERCIAL

## 2018-11-07 VITALS
DIASTOLIC BLOOD PRESSURE: 75 MMHG | HEIGHT: 71 IN | HEART RATE: 62 BPM | WEIGHT: 311 LBS | OXYGEN SATURATION: 98 % | SYSTOLIC BLOOD PRESSURE: 120 MMHG | RESPIRATION RATE: 18 BRPM | TEMPERATURE: 96.2 F | BODY MASS INDEX: 43.54 KG/M2

## 2018-11-07 DIAGNOSIS — R79.89 HIGH SERUM PARATHYROID HORMONE (PTH): ICD-10-CM

## 2018-11-07 DIAGNOSIS — L25.3 CONTACT DERMATITIS DUE TO OTHER CHEMICAL PRODUCT, UNSPECIFIED CONTACT DERMATITIS TYPE: ICD-10-CM

## 2018-11-07 DIAGNOSIS — E11.9 CONTROLLED TYPE 2 DIABETES MELLITUS WITHOUT COMPLICATION, UNSPECIFIED WHETHER LONG TERM INSULIN USE (HCC): ICD-10-CM

## 2018-11-07 DIAGNOSIS — Z90.3 POSTGASTRECTOMY MALABSORPTION: ICD-10-CM

## 2018-11-07 DIAGNOSIS — K91.2 POSTGASTRECTOMY MALABSORPTION: ICD-10-CM

## 2018-11-07 DIAGNOSIS — Z98.84 WEIGHT GAIN FOLLOWING GASTRIC BYPASS SURGERY: ICD-10-CM

## 2018-11-07 DIAGNOSIS — R63.5 WEIGHT GAIN FOLLOWING GASTRIC BYPASS SURGERY: ICD-10-CM

## 2018-11-07 DIAGNOSIS — E50.9 VITAMIN A DEFICIENCY: ICD-10-CM

## 2018-11-07 DIAGNOSIS — E55.9 VITAMIN D DEFICIENCY: Primary | ICD-10-CM

## 2018-11-07 PROBLEM — R10.9 ABDOMINAL PAIN: Status: RESOLVED | Noted: 2017-10-27 | Resolved: 2018-11-07

## 2018-11-07 PROBLEM — R21 RASH: Status: RESOLVED | Noted: 2017-01-05 | Resolved: 2018-11-07

## 2018-11-07 PROCEDURE — 99214 OFFICE O/P EST MOD 30 MIN: CPT | Performed by: INTERNAL MEDICINE

## 2018-11-07 PROCEDURE — 3008F BODY MASS INDEX DOCD: CPT | Performed by: INTERNAL MEDICINE

## 2018-11-07 PROCEDURE — 1036F TOBACCO NON-USER: CPT | Performed by: INTERNAL MEDICINE

## 2018-11-07 RX ORDER — ERGOCALCIFEROL 1.25 MG/1
CAPSULE ORAL
Qty: 24 CAPSULE | Refills: 2 | Status: SHIPPED | OUTPATIENT
Start: 2018-11-07 | End: 2019-04-25 | Stop reason: SDUPTHER

## 2018-11-07 NOTE — PROGRESS NOTES
Assessment/Plan: 89 will rx medication    1 type 2 diabetes A1c at goal continue diet lifestyle  2  Status post gastric bypass surgery vitamin-D level is still low [place] [address] units 2 times a week recheck in 4 months  3  Status post gastric bypass surgery vitamin a deficiency will Rx vitamin a recheck in 4 months  4  Weight gain after gastric bypass if he does not lose weight will agree to medication in 4 months         Diagnoses and all orders for this visit:    Vitamin D deficiency  -     ergocalciferol (VITAMIN D2) 50,000 units; 1 2 times a week  -     Vitamin A; Future  -     Vitamin B12; Future  -     CBC and differential; Future  -     Comprehensive metabolic panel; Future  -     Hemoglobin A1C; Future  -     Uric acid; Future  -     Vitamin D 25 hydroxy; Future  -     PTH, intact; Future    Controlled type 2 diabetes mellitus without complication, unspecified whether long term insulin use (HCC)  -     Vitamin A; Future  -     Vitamin B12; Future  -     CBC and differential; Future  -     Comprehensive metabolic panel; Future  -     Hemoglobin A1C; Future  -     Uric acid; Future  -     Vitamin D 25 hydroxy; Future  -     PTH, intact; Future    Postgastrectomy malabsorption  -     Vitamin A; Future  -     Vitamin B12; Future  -     CBC and differential; Future  -     Comprehensive metabolic panel; Future  -     Hemoglobin A1C; Future  -     Uric acid; Future  -     Vitamin D 25 hydroxy; Future  -     PTH, intact; Future    High serum parathyroid hormone (PTH)  -     Vitamin A; Future  -     Vitamin B12; Future  -     CBC and differential; Future  -     Comprehensive metabolic panel; Future  -     Hemoglobin A1C; Future  -     Uric acid; Future  -     Vitamin D 25 hydroxy; Future  -     PTH, intact; Future    Weight gain following gastric bypass surgery  -     Vitamin A; Future  -     Vitamin B12; Future  -     CBC and differential; Future  -     Comprehensive metabolic panel;  Future  - Hemoglobin A1C; Future  -     Uric acid; Future  -     Vitamin D 25 hydroxy; Future  -     PTH, intact; Future    Contact dermatitis due to other chemical product, unspecified contact dermatitis type  -     Vitamin A; Future  -     Vitamin B12; Future  -     CBC and differential; Future  -     Comprehensive metabolic panel; Future  -     Hemoglobin A1C; Future  -     Uric acid; Future  -     Vitamin D 25 hydroxy; Future  -     PTH, intact; Future    Vitamin A deficiency  -     vitamin A 7500 UNIT capsule; Take 1 capsule (7,500 Units total) by mouth daily  -     Vitamin A; Future  -     Vitamin B12; Future  -     CBC and differential; Future  -     Comprehensive metabolic panel; Future  -     Hemoglobin A1C; Future  -     Uric acid; Future  -     Vitamin D 25 hydroxy; Future  -     PTH, intact; Future         Scheduled Meds:  Continuous Infusions:  No current facility-administered medications for this visit  PRN Meds:   Scheduled Meds:  Continuous Infusions:  No current facility-administered medications for this visit     Scheduled Meds:    Current Outpatient Prescriptions:     Cholecalciferol (VITAMIN D3) 2000 units capsule, Take 1 tablet by mouth daily, Disp: , Rfl:     clobetasol (TEMOVATE) 0 05 % ointment, Apply topically, Disp: , Rfl:     Cyanocobalamin (VITAMIN B-12) 5000 MCG SUBL, Place under the tongue, Disp: , Rfl:     docusate sodium (COLACE) 100 mg capsule, Take by mouth daily, Disp: , Rfl:     econazole nitrate 1 % cream, Apply topically 2 (two) times a day, Disp: , Rfl:     ergocalciferol (VITAMIN D2) 50,000 units, 1 2 times a week, Disp: 24 capsule, Rfl: 2    Multiple Vitamin (MULTI-VITAMIN DAILY) TABS, Take by mouth, Disp: , Rfl:     Omega-3 1000 MG CAPS, Take by mouth, Disp: , Rfl:     vitamin A 7500 UNIT capsule, Take 1 capsule (7,500 Units total) by mouth daily, Disp: 100 capsule, Rfl: 2      The patient was counseled regarding instructions for management, risk factor reductions, patient and family education,impressions, risks and benefits of treatment options, side effects of medications, importance of compliance with treatment  The treatment plan was reviewed with the patient/guardian and patient/guardian understands and agrees with the treatment plan  Subjective:      Patient ID: Annabella Leija is a 52 y o  male  Occasional eczema flare ups of his left elbow        The following portions of the patient's history were reviewed and updated as appropriate:   He has a past medical history of Arthritis; Morbid obesity due to excess calories ([facility] [address] ); Type 2 diabetes mellitus ([facility] [address] ); Vitamin B1 deficiency; and Vitamin D deficiency  ,   does not have any pertinent problems on file  ,   has a past surgical history that includes Gastric bypass and Hernia repair  ,  family history includes Bone cancer in his father; Heart failure in his mother; Throat cancer in his father  ,   reports that he quit smoking about 8 years ago  He has never used smokeless tobacco  He reports that he drinks alcohol  He reports that he does not use drugs  ,  is allergic to prunus persica       Review of Systems   Constitutional: Negative for appetite change, chills, fatigue, fever and unexpected weight change  HENT: Negative for congestion, ear pain, facial swelling, hearing loss, mouth sores, nosebleeds, postnasal drip, rhinorrhea, sinus pain, sore throat, trouble swallowing and voice change  Eyes: Negative for pain, discharge, redness and visual disturbance  Respiratory: Negative for apnea, chest tightness, shortness of breath, wheezing and stridor  Cardiovascular: Negative for chest pain, palpitations and leg swelling  Gastrointestinal: Negative for abdominal distention, abdominal pain, blood in stool, constipation, diarrhea and vomiting  Endocrine: Negative for cold intolerance, heat intolerance, polydipsia, polyphagia and polyuria     Genitourinary: Negative for difficulty urinating, dysuria, flank pain, frequency, genital sores, hematuria and urgency  Musculoskeletal: Negative for arthralgias and back pain  Skin: Positive for rash  Negative for wound  Allergic/Immunologic: Negative for environmental allergies, food allergies and immunocompromised state  Neurological: Negative for dizziness, tremors, seizures, syncope, facial asymmetry, speech difficulty, weakness, light-headedness, numbness and headaches  Hematological: Negative for adenopathy  Does not bruise/bleed easily  Psychiatric/Behavioral: Negative for agitation, behavioral problems, dysphoric mood, hallucinations, self-injury, sleep disturbance and suicidal ideas  The patient is not hyperactive  Objective:     Physical Exam   Constitutional: He is oriented to person, place, and time  He appears well-developed  Morbid obesity   HENT:   Right Ear: External ear normal    Left Ear: External ear normal    Eyes: Right eye exhibits no discharge  Left eye exhibits no discharge  No scleral icterus  Neck: Carotid bruit is not present  No tracheal deviation present  No thyroid mass and no thyromegaly present  Cardiovascular: Normal rate, regular rhythm, normal heart sounds and intact distal pulses  Exam reveals no gallop and no friction rub  No murmur heard  Pulmonary/Chest: No respiratory distress  He has no wheezes  He has no rales  Musculoskeletal: He exhibits no edema  Lymphadenopathy:     He has no cervical adenopathy  Neurological: He is alert and oriented to person, place, and time  Coordination normal    Skin:   Eczema left elbow   Psychiatric: He has a normal mood and affect  His behavior is normal  Judgment and thought content normal    Nursing note and vitals reviewed        Vitals:    11/07/18 1004 11/07/18 1114   BP:  120/75   BP Location:  Left arm   Patient Position:  Sitting   Pulse: 62    Resp: 18    Temp: (!) 96 2 °F (35 7 °C)    SpO2: 98%    Weight: (!) 141 kg (311 lb)    Height: 5' 10 5" (1 791 m)

## 2018-11-07 NOTE — PATIENT INSTRUCTIONS
Obesity   WHAT YOU NEED TO KNOW:   What is obesity? Obesity is when your body mass index (BMI) is greater than 30  Your healthcare provider will use your height and weight to measure your BMI  What are the risks of obesity? Obesity can cause many health problems, including injuries or physical disability  You may need tests to check for the following:  · Diabetes     · High blood pressure or high cholesterol     · Heart disease     · Gallbladder or liver disease     · Cancer of the colon, breast, prostate, liver, or kidney     · Sleep apnea     · Arthritis or gout  How is obesity treated? The goal of treatment is to help you lose weight so your health will improve  Even a small decrease in BMI can reduce the risk for many health problems  Your healthcare provider will help you set a weight-loss goal   · Lifestyle changes  are the first step in treating obesity  These include making healthy food choices and getting regular physical activity  Your healthcare provider may suggest a weight-loss program that involves coaching, education, and therapy  · Medicine  may help you lose weight when it is used with a healthy diet and physical activity  · Surgery  can help you lose weight if you are very obese and have other health problems  There are several types of weight-loss surgery  Ask your healthcare provider for more information  How can I be successful at losing weight? · Set small, realistic goals  An example of a small goal is to walk for 20 minutes 5 days a week  Anther goal is to lose 5% of your body weight  · Tell friends, family members, and coworkers about your goals  and ask for their support  Ask a friend to lose weight with you, or join a weight-loss support group  · Identify foods or triggers that may cause you to overeat , and find ways to avoid them  Remove tempting high-calorie foods from your home and workplace  Place a bowl of fresh fruit on your kitchen counter   If stress causes you to eat, then find other ways to cope with stress  · Keep a diary to track what you eat and drink  Also write down how many minutes of physical activity you do each day  Weigh yourself once a week and record it in your diary  What eating changes should I make? You will need to eat 500 to 1,000 fewer calories each day than you currently eat to lose 1 to 2 pounds a week  The following changes will help you cut calories:  · Eat smaller portions  Use small plates, no larger than 9 inches in diameter  Fill your plate half full of fruits and vegetables  Measure your food using measuring cups until you know what a serving size looks like  · Eat 3 meals and 1 or 2 snacks each day  Plan your meals in advance  [employer] and eat at home most of the time  Eat slowly  · Eat fruits and vegetables at every meal   They are low in calories and high in fiber, which makes you feel full  Do not add butter, margarine, or cream sauce to vegetables  Use herbs to season steamed vegetables  · Eat less fat and fewer fried foods  Eat more baked or grilled chicken and fish  These protein sources are lower in calories and fat than red meat  Limit fast food  Dress your salads with olive oil and vinegar instead of bottled dressing  · Limit the amount of sugar you eat  Do not drink sugary beverages  Limit alcohol  What activity changes should I make? Physical activity is good for your body in many ways  It helps you burn calories and build strong muscles  It decreases stress and depression, and improves your mood  It can also help you sleep better  Talk to your healthcare provider before you begin an exercise program   · Exercise for at least 30 minutes 5 days a week  Start slowly  Set aside time each day for physical activity that you enjoy and that is convenient for you  It is best to do both weight training and an activity that increases your heart rate, such as walking, bicycling, or swimming       · Find ways to be more active  Do yard work and housecleaning  Walk up the stairs instead of using elevators  Spend your leisure time going to events that require walking, such as outdoor festivals or fairs  This extra physical activity can help you lose weight and keep it off  When should I seek immediate care? · You have a severe headache, confusion, or difficulty speaking  · You have weakness on one side of your body  · You have chest pain, sweating, or shortness of breath  When should I contact my healthcare provider? · You have symptoms of gallbladder or liver disease, such as pain in your upper abdomen  · You have knee or hip pain and discomfort while walking  · You have symptoms of diabetes, such as intense hunger and thirst, and frequent urination  · You have symptoms of sleep apnea, such as snoring or daytime sleepiness  · You have questions or concerns about your condition or care  CARE AGREEMENT:   You have the right to help plan your care  Learn about your health condition and how it may be treated  Discuss treatment options with your caregivers to decide what care you want to receive  You always have the right to refuse treatment  The above information is an  only  It is not intended as medical advice for individual conditions or treatments  Talk to your doctor, nurse or pharmacist before following any medical regimen to see if it is safe and effective for you  © 2017 [employer] Information is for End User's use only and may not be sold, redistributed or otherwise used for commercial purposes  All illustrations and images included in CareNotes® are the copyrighted property of [employer]  or [employer]

## 2018-12-03 ENCOUNTER — TELEPHONE (OUTPATIENT)
Dept: INTERNAL MEDICINE CLINIC | Facility: CLINIC | Age: 53
End: 2018-12-03

## 2018-12-03 DIAGNOSIS — R21 RASH: Primary | ICD-10-CM

## 2018-12-03 DIAGNOSIS — L82.1 SEBORRHEIC KERATOSES: ICD-10-CM

## 2018-12-03 DIAGNOSIS — B37.2 CANDIDAL SKIN INFECTION: Primary | ICD-10-CM

## 2018-12-03 NOTE — TELEPHONE ENCOUNTER
Patient saw dr Betzy Stevenson on 11/7 for eczema on left elbow  Patient thought dr Betzy Stevenson was going to prescribe a cream?     Please advise

## 2019-04-08 LAB
25(OH)D3+25(OH)D2 SERPL-MCNC: 26.3 NG/ML (ref 30–100)
ALBUMIN SERPL-MCNC: 4.4 G/DL (ref 3.5–5.5)
ALBUMIN/GLOB SERPL: 1.5 {RATIO} (ref 1.2–2.2)
ALP SERPL-CCNC: 82 IU/L (ref 39–117)
ALT SERPL-CCNC: 24 IU/L (ref 0–44)
AST SERPL-CCNC: 20 IU/L (ref 0–40)
BASOPHILS # BLD AUTO: 0.1 X10E3/UL (ref 0–0.2)
BASOPHILS NFR BLD AUTO: 1 %
BILIRUB SERPL-MCNC: 0.3 MG/DL (ref 0–1.2)
BUN SERPL-MCNC: 13 MG/DL (ref 6–24)
BUN/CREAT SERPL: 15 (ref 9–20)
CALCIUM SERPL-MCNC: 9.2 MG/DL (ref 8.7–10.2)
CHLORIDE SERPL-SCNC: 102 MMOL/L (ref 96–106)
CO2 SERPL-SCNC: 24 MMOL/L (ref 20–29)
CREAT SERPL-MCNC: 0.88 MG/DL (ref 0.76–1.27)
EOSINOPHIL # BLD AUTO: 0.2 X10E3/UL (ref 0–0.4)
EOSINOPHIL NFR BLD AUTO: 3 %
ERYTHROCYTE [DISTWIDTH] IN BLOOD BY AUTOMATED COUNT: 13.9 % (ref 12.3–15.4)
GLOBULIN SER-MCNC: 2.9 G/DL (ref 1.5–4.5)
GLUCOSE SERPL-MCNC: 91 MG/DL (ref 65–99)
HBA1C MFR BLD: 5.8 % (ref 4.8–5.6)
HCT VFR BLD AUTO: 42.9 % (ref 37.5–51)
HGB BLD-MCNC: 14.2 G/DL (ref 13–17.7)
IMM GRANULOCYTES # BLD: 0 X10E3/UL (ref 0–0.1)
IMM GRANULOCYTES NFR BLD: 0 %
LYMPHOCYTES # BLD AUTO: 2.3 X10E3/UL (ref 0.7–3.1)
LYMPHOCYTES NFR BLD AUTO: 37 %
MCH RBC QN AUTO: 29.9 PG (ref 26.6–33)
MCHC RBC AUTO-ENTMCNC: 33.1 G/DL (ref 31.5–35.7)
MCV RBC AUTO: 90 FL (ref 79–97)
MONOCYTES # BLD AUTO: 0.7 X10E3/UL (ref 0.1–0.9)
MONOCYTES NFR BLD AUTO: 11 %
NEUTROPHILS # BLD AUTO: 3 X10E3/UL (ref 1.4–7)
NEUTROPHILS NFR BLD AUTO: 48 %
PLATELET # BLD AUTO: 257 X10E3/UL (ref 150–379)
POTASSIUM SERPL-SCNC: 4.4 MMOL/L (ref 3.5–5.2)
PROT SERPL-MCNC: 7.3 G/DL (ref 6–8.5)
PTH-INTACT SERPL-MCNC: 75 PG/ML (ref 15–65)
RBC # BLD AUTO: 4.75 X10E6/UL (ref 4.14–5.8)
SL AMB EGFR AFRICAN AMERICAN: 112 ML/MIN/1.73
SL AMB EGFR NON AFRICAN AMERICAN: 97 ML/MIN/1.73
SODIUM SERPL-SCNC: 142 MMOL/L (ref 134–144)
URATE SERPL-MCNC: 6.1 MG/DL (ref 3.7–8.6)
VIT A SERPL-MCNC: 33.1 UG/DL (ref 20.1–62)
VIT B12 SERPL-MCNC: 249 PG/ML (ref 232–1245)
WBC # BLD AUTO: 6.3 X10E3/UL (ref 3.4–10.8)

## 2019-04-25 ENCOUNTER — OFFICE VISIT (OUTPATIENT)
Dept: INTERNAL MEDICINE CLINIC | Facility: CLINIC | Age: 54
End: 2019-04-25
Payer: COMMERCIAL

## 2019-04-25 VITALS
BODY MASS INDEX: 43.71 KG/M2 | DIASTOLIC BLOOD PRESSURE: 80 MMHG | TEMPERATURE: 96.8 F | WEIGHT: 312.2 LBS | HEART RATE: 59 BPM | SYSTOLIC BLOOD PRESSURE: 120 MMHG | RESPIRATION RATE: 18 BRPM | HEIGHT: 71 IN | OXYGEN SATURATION: 97 %

## 2019-04-25 DIAGNOSIS — E55.9 VITAMIN D DEFICIENCY: ICD-10-CM

## 2019-04-25 DIAGNOSIS — E11.9 CONTROLLED TYPE 2 DIABETES MELLITUS WITHOUT COMPLICATION, UNSPECIFIED WHETHER LONG TERM INSULIN USE (HCC): ICD-10-CM

## 2019-04-25 DIAGNOSIS — E53.8 VITAMIN B12 DEFICIENCY: ICD-10-CM

## 2019-04-25 DIAGNOSIS — L43.9 LICHEN PLANUS: ICD-10-CM

## 2019-04-25 DIAGNOSIS — E34.9 ELEVATED PARATHYROID HORMONE: Primary | ICD-10-CM

## 2019-04-25 DIAGNOSIS — R79.89 HIGH SERUM PARATHYROID HORMONE (PTH): ICD-10-CM

## 2019-04-25 PROCEDURE — 96372 THER/PROPH/DIAG INJ SC/IM: CPT

## 2019-04-25 PROCEDURE — 99214 OFFICE O/P EST MOD 30 MIN: CPT | Performed by: NURSE PRACTITIONER

## 2019-04-25 RX ORDER — CLOBETASOL PROPIONATE 0.5 MG/G
OINTMENT TOPICAL 2 TIMES DAILY
Qty: 30 G | Refills: 1 | Status: SHIPPED | OUTPATIENT
Start: 2019-04-25 | End: 2019-11-07 | Stop reason: SDUPTHER

## 2019-04-25 RX ORDER — CYANOCOBALAMIN 1000 UG/ML
1000 INJECTION INTRAMUSCULAR; SUBCUTANEOUS
Status: SHIPPED | OUTPATIENT
Start: 2019-04-25

## 2019-04-25 RX ORDER — ERGOCALCIFEROL 1.25 MG/1
CAPSULE ORAL
Qty: 24 CAPSULE | Refills: 2 | Status: SHIPPED | OUTPATIENT
Start: 2019-04-25 | End: 2019-11-07 | Stop reason: SDUPTHER

## 2019-04-25 RX ADMIN — CYANOCOBALAMIN 1000 MCG: 1000 INJECTION INTRAMUSCULAR; SUBCUTANEOUS at 11:06

## 2019-06-03 ENCOUNTER — OFFICE VISIT (OUTPATIENT)
Dept: INTERNAL MEDICINE CLINIC | Facility: CLINIC | Age: 54
End: 2019-06-03
Payer: OTHER MISCELLANEOUS

## 2019-06-03 VITALS
RESPIRATION RATE: 18 BRPM | TEMPERATURE: 97.1 F | OXYGEN SATURATION: 97 % | HEIGHT: 71 IN | SYSTOLIC BLOOD PRESSURE: 116 MMHG | DIASTOLIC BLOOD PRESSURE: 80 MMHG | HEART RATE: 62 BPM | BODY MASS INDEX: 44.1 KG/M2 | WEIGHT: 315 LBS

## 2019-06-03 DIAGNOSIS — Z48.02 VISIT FOR SUTURE REMOVAL: ICD-10-CM

## 2019-06-03 DIAGNOSIS — E11.21 CONTROLLED TYPE 2 DIABETES MELLITUS WITH DIABETIC NEPHROPATHY, WITHOUT LONG-TERM CURRENT USE OF INSULIN (HCC): Primary | ICD-10-CM

## 2019-06-03 PROCEDURE — S0630 REMOVAL OF SUTURES: HCPCS | Performed by: NURSE PRACTITIONER

## 2019-07-03 LAB
LEFT EYE DIABETIC RETINOPATHY: NORMAL
RIGHT EYE DIABETIC RETINOPATHY: NORMAL

## 2019-07-12 ENCOUNTER — OFFICE VISIT (OUTPATIENT)
Dept: BARIATRICS | Facility: CLINIC | Age: 54
End: 2019-07-12
Payer: COMMERCIAL

## 2019-07-12 VITALS
WEIGHT: 315 LBS | HEIGHT: 71 IN | DIASTOLIC BLOOD PRESSURE: 82 MMHG | HEART RATE: 69 BPM | SYSTOLIC BLOOD PRESSURE: 122 MMHG | RESPIRATION RATE: 18 BRPM | BODY MASS INDEX: 44.1 KG/M2 | TEMPERATURE: 97.4 F

## 2019-07-12 DIAGNOSIS — Z98.84 BARIATRIC SURGERY STATUS: ICD-10-CM

## 2019-07-12 DIAGNOSIS — Z90.3 POSTGASTRECTOMY MALABSORPTION: ICD-10-CM

## 2019-07-12 DIAGNOSIS — E66.01 OBESITY, CLASS III, BMI 40-49.9 (MORBID OBESITY) (HCC): Primary | ICD-10-CM

## 2019-07-12 DIAGNOSIS — K91.2 POSTGASTRECTOMY MALABSORPTION: ICD-10-CM

## 2019-07-12 PROBLEM — E66.813 OBESITY, CLASS III, BMI 40-49.9 (MORBID OBESITY): Status: ACTIVE | Noted: 2019-07-12

## 2019-07-12 PROCEDURE — 99213 OFFICE O/P EST LOW 20 MIN: CPT | Performed by: SURGERY

## 2019-07-12 NOTE — PROGRESS NOTES
FOLLOW UP VISIT - BARIATRIC SURGERY  Guille M Erin 62 y o  male MRN: [id number]  Unit/Bed#:  Encounter: [id number]      HPI:  Anthony Birmingham is a 62 y o  male who presents with a gastric bypass in   Here for scheduled follow-up      Review of Systems   All other systems reviewed and are negative  Historical Information   Past Medical History:   Diagnosis Date    Arthritis     Last assessed: 12    History of bariatric surgery     Morbid obesity due to excess calories ([facility] )     Last assessed: 12    Postsurgical malabsorption     Type 2 diabetes mellitus ([facility] )     Last assessed: 6/5/15    Vitamin B1 deficiency     Last assessed: 14    Vitamin D deficiency     Last assessed: 14     Past Surgical History:   Procedure Laterality Date    GASTRIC BYPASS      For Morbid Obesity  Managed by: [facility]   Last assessed: 14    HERNIA REPAIR       Social History   Social History     Substance and Sexual Activity   Alcohol Use Yes    Comment: Social     Social History     Substance and Sexual Activity   Drug Use No     Social History     Tobacco Use   Smoking Status Former Smoker    Last attempt to quit: 2010    Years since quittin 2   Smokeless Tobacco Never Used     Family History: non-contributory    Meds/Allergies   all medications and allergies reviewed  Allergies   Allergen Reactions    Prunus Persica Anaphylaxis     Also frankie, plums, apricot, nectarine       Objective   First Vitals:   @VSFIRST2(5,8,6,7,9,11,14,10:FIRST)@    Current Vitals:   Blood Pressure: 122/82 (19 1401)  Pulse: 69 (19 1401)  Temperature: (!) 97 4 °F (36 3 °C) (19 1401)  Temp Source: Tympanic (19 1401)  Respirations: 18 (19 1401)  Height: 5' 10 5" (179 1 cm) (19 140)  Weight - Scale: (!) 144 kg (318 lb 8 oz) (19 140)        Invasive Devices     None                 Physical Exam   Constitutional: He is oriented to person, place, and time  He appears well-developed  No distress  Abdominal: Soft  Bowel sounds are normal  He exhibits no distension and no mass  There is no tenderness  There is no rebound and no guarding  Abdomen is obese  Soft and benign  Incisions well healed  Large umbilical hernia as before  Neurological: He is alert and oriented to person, place, and time  Psychiatric: He has a normal mood and affect  His behavior is normal  Judgment and thought content normal    Vitals reviewed  Lab Results: I have personally reviewed pertinent lab results  Assessment/PLAN:    62 y o  yo male with a history of laparoscopic Shae-en-Y gastric bypass on 5/3/2011  He has lost 43% excess weight loss and 24% total body weight  He has regained 40 lb since his lowest weight back in 2013  I have reviewed his nutritional labs and he has vitamin D is low and his hemoglobin A1c is elevated  I recently operated on his wife and told him that I wanted to see him back  Have encouraged him to make an appointment to see our Rakesh Singh to help in continue lose weight  He continues to have his umbilical hernia and we have discussed that the best time to repair this is once he has lost weight  Since his wife just had the surgery there is a unique opportunity to follow up the same recommendations to help him lose weight  He is motivated and wants to do this  I had a detailed discussion with him stressing the fact that long-term success is largely dependent on compliance and abidance to the recommendations of the program as well as participation within the support groups  He is committed to continue to observe his diet and to increase his physical activity  He will follow up with us as scheduled      Blair Morrell MD  7/12/2019  2:12 PM

## 2019-07-26 ENCOUNTER — OFFICE VISIT (OUTPATIENT)
Dept: BARIATRICS | Facility: CLINIC | Age: 54
End: 2019-07-26

## 2019-07-26 VITALS — WEIGHT: 315 LBS | HEIGHT: 71 IN | BODY MASS INDEX: 44.1 KG/M2

## 2019-07-26 DIAGNOSIS — Z98.84 S/P GASTRIC BYPASS: Primary | ICD-10-CM

## 2019-07-26 PROCEDURE — RECHECK

## 2019-07-26 NOTE — PROGRESS NOTES
Bariatric Behavioral Health Evaluation    Presenting Problem patient here to improve health and be evaluated for MWM program      Is the patient seeking Bariatric Surgery Eval? No If yes how long have you researched this surgery option  Wife had surgery three weeks ago with Dr Kisha Bull  Realizes Post- Op Requirements? Yes     Pre-morbid level of function and history of present illness: patient has weight gain  Psychiatric/Psychological Treatment Diagnosis: patient denies Axis I diagnosis and treatment  Family Constellation (include relationship with each and Psych/Med HX)    Domestic Violence No    Additional comments/stressors related to family/relationships/peer support     Physical/Psychological Assessment:     Appearance: appropriate  Sociability: average  Affect: appropriate  Mood: elated  Thought Process: coherent  Speech: normal  Content: no impairment  Orientation: person  Yes , place  Yes , time  Yes , normal attention span  Yes , normal memory  Yes   and normal judgement  Yes   Insight: emotional  good    Risk Assessment:     none    Recommendations: MWM program      Risk of Harm to Self or Others: none reported  Observation:     Interviews this interview only  Access to weapons yes     Weapons secured by locks and locked case  Based on the previous information, the client presents the following risk of harm to self or others: low     Note Patient denies Axis I diagnosis and treatment  Patient educated regarding the impcat of nicotine and alcohol on the post surgery bariatric patient   Patient is appropriate for MWM program and is referred to the program

## 2019-07-26 NOTE — PROGRESS NOTES
Bariatric Follow Up Nutrition Note    Post-op    Type of surgery  Gastric bypass: laparoscopic  Surgery Date: 5/3/2011  Surgeon: Dr Doris Macias  62 y o   male    There were no vitals filed for this visit  Weight (last 2 days)     Date/Time   Weight    07/26/19 1044   (!) 143 (315 4)            Body mass index is 44 62 kg/m²  Height: 70 5 inches    Pt lives in [facility] and would like to follow-up at the [facility] office  Pt states he is interested in using meal replacements for weight loss  Referred pt to TERRELL Sorto for MWM  Provided pt with my information to contact me with further questions  Pt is excited to continue to work on his weight loss progress and appreciated the recommendations and discussions today  Post-op Weight History:  Pt is about 8 years post-op  Weight on Day of Initial Dietary Evaluation: 421 5 lbs  (BMI = 56 4)  Weight on Day of Weight Loss Surgery: 398 0 lbs  (BMI = 53 2)  Current Weight: 315 4 lbs  (BMI = 44 6)  %EWL = 44%  [place]: 276 0 lbs  (BMI 36 9) in [id number]  Amount of Weight Regain: 40-50 lbs  in the past 5 years    Review of History and Medications   Past Medical History:   Diagnosis Date    Arthritis     Last assessed: 5/21/12    History of bariatric surgery     Morbid obesity due to excess calories ([address] )     Last assessed: 5/21/12    Postsurgical malabsorption     Type 2 diabetes mellitus ([facility] [address] )     Last assessed: 6/5/15    Vitamin B1 deficiency     Last assessed: 9/29/14    Vitamin D deficiency     Last assessed: 9/29/14     Past Surgical History:   Procedure Laterality Date    GASTRIC BYPASS      For Morbid Obesity  Managed by: Elijah Reyes   Last assessed: 6/16/14    HERNIA REPAIR       Social History     Socioeconomic History    Marital status: /Civil Union     Spouse name: Not on file    Number of children: Not on file    Years of education: Not on file    Highest education level: Not on file Occupational History    Not on file   Social Needs    Financial resource strain: Not on file    Food insecurity:     Worry: Not on file     Inability: Not on file    Transportation needs:     Medical: Not on file     Non-medical: Not on file   Tobacco Use    Smoking status: Former Smoker     Last attempt to quit: 2010     Years since quittin 2    Smokeless tobacco: Never Used   Substance and Sexual Activity    Alcohol use: Yes     Comment: Social    Drug use: No    Sexual activity: Not on file   Lifestyle    Physical activity:     Days per week: Not on file     Minutes per session: Not on file    Stress: Not on file   Relationships    Social connections:     Talks on phone: Not on file     Gets together: Not on file     Attends Yazidism service: Not on file     Active member of club or organization: Not on file     Attends meetings of clubs or organizations: Not on file     Relationship status: Not on file    Intimate partner violence:     Fear of current or ex partner: Not on file     Emotionally abused: Not on file     Physically abused: Not on file     Forced sexual activity: Not on file   Other Topics Concern    Not on file   Social History Narrative    Not on file       Current Outpatient Medications:     Cholecalciferol (VITAMIN D3) 2000 units capsule, Take 1 tablet by mouth daily, Disp: , Rfl:     clobetasol (TEMOVATE) 0 05 % ointment, Apply topically 2 (two) times a day, Disp: 30 g, Rfl: 1    Cyanocobalamin (VITAMIN B-12) 5000 MCG SUBL, Place under the tongue, Disp: , Rfl:     docusate sodium (COLACE) 100 mg capsule, Take by mouth daily, Disp: , Rfl:     ergocalciferol (VITAMIN D2) 50,000 units, 1 2 times a week, Disp: 24 capsule, Rfl: 2    Multiple Vitamin (MULTI-VITAMIN DAILY) TABS, Take by mouth, Disp: , Rfl:     Omega-3 1000 MG CAPS, Take by mouth, Disp: , Rfl:     Current Facility-Administered Medications:     cyanocobalamin injection 1,000 mcg, 1,000 mcg, Intramuscular, Q30 Days, NEAL Rhodes, 1,000 mcg at 04/25/19 1106    Food Intake and Lifestyle Assessment:  Pt is following 30/60-minute rule - yes  Pt is food journaling/logging - yes  Pt is measuring foods using measuring cups and spoons - yes  Pt is taking required post-operative vitamins and minerals - pt is following-up with Dr Chapo Mayberry regarding MVI status  Pt is getting at least 64 oz of sugar-free, caffeine-free, noncarbonated fluids daily - yes   Types of fluids include: water and coffee/tea   Pt is exercising - yes - pt states he is walking 2 miles about 3 times per week for exercise    Food Intake Assessment completed via usual diet recall:  Breakfast: Bariatric Fusion shake; sometimes blueberry Cheerios (dry) with 1% milk later to follow the 30/60-minute rule; 1 cup regular coffee (black)  Snack: none   Lunch: another Bariatric Fusion shake mixed with some applesauce  Snack: grapes - pt states he uses this snack to curb his appetite  Dinner: homemade chicken fajitas with chicken breast, salsa, cheese, and lettuce  Snack: none; sometimes tea with honey    Pt states he likes to use meal replacements and will replace at least his breakfast and lunch or dinner with a shake each day      Estimated fluid intake per day: at least 64 ounces of water per pt  Protein supplement: pt uses 2 Bariatric Fusion protein shakes per day  Estimated protein intake per day: at least 75 grams  Meals eaten away from home: none  Typical meal pattern: 3 meals per day and 1-2 snacks per day  Eating Behaviors: Appropriate diet advancement, Appropriate portion sizes and Does not drink with meals and waits 30-minutes after meal before resuming drinking  Cultural or Gnosticism considerations: n/a    Nutrition Diagnosis  Diagnosis: Overweight / Obesity (NC-3 3)  Related to: Excessive energy intake and Altered GI function  As Evidenced by: BMI >25, Expected anthropometric outcomes are not achieved and Unintentional weight gain Interventions and Teaching   Patient educated on post-op nutrition guidelines  Patient educated and handouts provided    Capacity of post-surgery stomach  Adequate hydration  Weight loss plateaus/possibility of weight regain  Exercise  Nutrition considerations after surgery  Protein supplements  Meal planning and preparation  Appropriate carbohydrate, protein, and fat intake, and food/fluid choices to maximize safe weight loss, nutrient intake, and tolerance   Dietary and lifestyle changes  Possible problems with poor eating habits  Intuitive eating  Techniques for self monitoring and keeping daily food journal  Vitamin / Mineral supplementation     Education provided to: patient  Barriers to learning: No barriers identified  Readiness to change: Preparation, Action and Relapse  Comprehension: demonstrated understanding   Expected Compliance: good     Evaluation / Monitoring:  Eating pattern as discussed, Tolerance of nutrition prescription, Body weight, Lab values, Physical activity     Goals:  Food journal, Exercise 30 minutes 5 times per week     Time Spent:   30 Minutes

## 2019-10-11 LAB — HBA1C MFR BLD HPLC: 5.9 %

## 2019-10-12 LAB
ALBUMIN SERPL-MCNC: 4.1 G/DL (ref 3.5–5.5)
ALBUMIN/GLOB SERPL: 1.6 {RATIO} (ref 1.2–2.2)
ALP SERPL-CCNC: 76 IU/L (ref 39–117)
ALT SERPL-CCNC: 24 IU/L (ref 0–44)
AST SERPL-CCNC: 18 IU/L (ref 0–40)
BASOPHILS # BLD AUTO: 0 X10E3/UL (ref 0–0.2)
BASOPHILS NFR BLD AUTO: 0 %
BILIRUB SERPL-MCNC: 0.3 MG/DL (ref 0–1.2)
BUN SERPL-MCNC: 11 MG/DL (ref 6–24)
BUN/CREAT SERPL: 13 (ref 9–20)
CALCIUM SERPL-MCNC: 9 MG/DL (ref 8.7–10.2)
CHLORIDE SERPL-SCNC: 103 MMOL/L (ref 96–106)
CHOLEST SERPL-MCNC: 148 MG/DL (ref 100–199)
CO2 SERPL-SCNC: 23 MMOL/L (ref 20–29)
CREAT SERPL-MCNC: 0.85 MG/DL (ref 0.76–1.27)
EOSINOPHIL # BLD AUTO: 0.2 X10E3/UL (ref 0–0.4)
EOSINOPHIL NFR BLD AUTO: 3 %
ERYTHROCYTE [DISTWIDTH] IN BLOOD BY AUTOMATED COUNT: 13.6 % (ref 12.3–15.4)
EST. AVERAGE GLUCOSE BLD GHB EST-MCNC: 123 MG/DL
GLOBULIN SER-MCNC: 2.6 G/DL (ref 1.5–4.5)
GLUCOSE SERPL-MCNC: 87 MG/DL (ref 65–99)
HBA1C MFR BLD: 5.9 % (ref 4.8–5.6)
HCT VFR BLD AUTO: 40.5 % (ref 37.5–51)
HDLC SERPL-MCNC: 49 MG/DL
HGB BLD-MCNC: 13.2 G/DL (ref 13–17.7)
IMM GRANULOCYTES # BLD: 0 X10E3/UL (ref 0–0.1)
IMM GRANULOCYTES NFR BLD: 0 %
LDLC SERPL CALC-MCNC: 89 MG/DL (ref 0–99)
LYMPHOCYTES # BLD AUTO: 2.6 X10E3/UL (ref 0.7–3.1)
LYMPHOCYTES NFR BLD AUTO: 38 %
MCH RBC QN AUTO: 29.3 PG (ref 26.6–33)
MCHC RBC AUTO-ENTMCNC: 32.6 G/DL (ref 31.5–35.7)
MCV RBC AUTO: 90 FL (ref 79–97)
MONOCYTES # BLD AUTO: 0.6 X10E3/UL (ref 0.1–0.9)
MONOCYTES NFR BLD AUTO: 9 %
NEUTROPHILS # BLD AUTO: 3.3 X10E3/UL (ref 1.4–7)
NEUTROPHILS NFR BLD AUTO: 50 %
PLATELET # BLD AUTO: 268 X10E3/UL (ref 150–450)
POTASSIUM SERPL-SCNC: 4.3 MMOL/L (ref 3.5–5.2)
PROT SERPL-MCNC: 6.7 G/DL (ref 6–8.5)
PTH-INTACT SERPL-MCNC: 61 PG/ML (ref 15–65)
RBC # BLD AUTO: 4.5 X10E6/UL (ref 4.14–5.8)
SL AMB EGFR AFRICAN AMERICAN: 113 ML/MIN/1.73
SL AMB EGFR NON AFRICAN AMERICAN: 97 ML/MIN/1.73
SL AMB VLDL CHOLESTEROL CALC: 10 MG/DL (ref 5–40)
SODIUM SERPL-SCNC: 139 MMOL/L (ref 134–144)
TRIGL SERPL-MCNC: 48 MG/DL (ref 0–149)
TSH SERPL DL<=0.005 MIU/L-ACNC: 2.02 UIU/ML (ref 0.45–4.5)
WBC # BLD AUTO: 6.7 X10E3/UL (ref 3.4–10.8)

## 2019-10-17 ENCOUNTER — OFFICE VISIT (OUTPATIENT)
Dept: BARIATRICS | Facility: CLINIC | Age: 54
End: 2019-10-17
Payer: COMMERCIAL

## 2019-10-17 VITALS
HEIGHT: 71 IN | HEART RATE: 56 BPM | RESPIRATION RATE: 18 BRPM | SYSTOLIC BLOOD PRESSURE: 120 MMHG | BODY MASS INDEX: 44.1 KG/M2 | TEMPERATURE: 97.8 F | DIASTOLIC BLOOD PRESSURE: 70 MMHG | WEIGHT: 315 LBS

## 2019-10-17 DIAGNOSIS — E66.01 OBESITY, CLASS III, BMI 40-49.9 (MORBID OBESITY) (HCC): Primary | ICD-10-CM

## 2019-10-17 DIAGNOSIS — K91.2 POSTGASTRECTOMY MALABSORPTION: ICD-10-CM

## 2019-10-17 DIAGNOSIS — Z91.89 AT RISK FOR OBSTRUCTIVE SLEEP APNEA: ICD-10-CM

## 2019-10-17 DIAGNOSIS — Z90.3 POSTGASTRECTOMY MALABSORPTION: ICD-10-CM

## 2019-10-17 PROCEDURE — 99214 OFFICE O/P EST MOD 30 MIN: CPT | Performed by: PHYSICIAN ASSISTANT

## 2019-10-17 NOTE — ASSESSMENT & PLAN NOTE
-Discussed options of Healthy[facility]-Intensive Lifestyle Intervention Program, Very Low Calorie Diet-VLCD and Conservative Program and the role of weight loss medications   -Initial weight loss goal of 5-10% weight loss for improved health  -Screening labs   Recommend checking lab coverage before having labs drawn   -lipid, tsh, a1c, cmp reviewed from 10/11/19 all within acceptable limits  - STOP BANG-4/8  -Patient is interested in pursuing Very Low Calorie Diet-VLCD    VLCD Review:  Labs ordered: cmp reviewed 10/11/19   HTN meds addressed: n/a  DM2 meds addressed: n/a  VLCD time restriction based on BMI: no

## 2019-10-17 NOTE — PROGRESS NOTES
Assessment/Plan:    Obesity, Class III, BMI 40-49 9 (morbid obesity) ([facility] )  -Discussed options of Healthy[facility]-Intensive Lifestyle Intervention Program, Very Low Calorie Diet-VLCD and Conservative Program and the role of weight loss medications   -Initial weight loss goal of 5-10% weight loss for improved health  -Screening labs  Recommend checking lab coverage before having labs drawn   -lipid, tsh, a1c, cmp reviewed from 10/11/19 all within acceptable limits  - STOP BANG-  -Patient is interested in pursuing Very Low Calorie Diet-VLCD    VLCD Review:  Labs ordered: cmp reviewed 10/11/19   HTN meds addressed: n/a  DM2 meds addressed: n/a  VLCD time restriction based on BMI: no    At risk for obstructive sleep apnea  STOP BAN/8  - Sleep medicine referral placed   -Discussed risks of untreated sleep apnea such as sudden cardiac death by arrhythmia, uncontrolled hypertension, difficulty with weight loss, decreased quality sleep, increased insulin resistance, and stroke  -Should improve with dietary and lifestyle changes        Postgastrectomy malabsorption  Bypass by Dr Chastity Swann in        Tolerating a regular diet-yes  Eating at least 60 grams of protein per day-yes  Following 30/60 minute rule with liquids-yes  Drinking at least 64 ounces of fluid per day-yes  Drinking carbonated beverages-no  Sufficient exercise-no  Using NSAIDs regularly-no  Using nicotine-no  Using alcohol-yes--very occasionally       Will schedule an appt with kalpana MONTES to review labs and vitamins  Patient is currently taking multivitamin       Diagnoses and all orders for this visit:    Obesity, Class III, BMI 40-49 9 (morbid obesity) ([facility] )  -     Ambulatory referral to Sleep Medicine; Future    At risk for obstructive sleep apnea  -     Ambulatory referral to Sleep Medicine;  Future    Postgastrectomy malabsorption    Other orders  -     CALCIUM CITRATE PO; Take by mouth          Subjective:   Chief Complaint   Patient presents with   [employer] Consult     Pt here for post op wt gain consult  Patient ID: Fabienne Perdomo  is a 62 y o  male with excess weight/obesity here to pursue weight management  Past Medical History:   Diagnosis Date    Arthritis     Last assessed: 5/21/12    History of bariatric surgery     Morbid obesity due to excess calories [address] )     Last assessed: 5/21/12    Postsurgical malabsorption     Type 2 diabetes mellitus ([facility] )     Last assessed: 6/5/15    Vitamin B1 deficiency     Last assessed: 9/29/14    Vitamin D deficiency     Last assessed: 9/29/14       HPI:  currently for weight loss patient is getting in 5000 steps, he is trying to eat more steamed foods instead of fried  Patient is not  Currently logging  He drinks 96 oz of water a day and v8 1 glass three times per week  Patient loves fish and could eat it multiple times per day  Patient notes he works in [place] often working 16 hour shifts and is always on call and this makes it hard for him to find time to go to the gym  Colonoscopy-Completed due 8/19/20    The following portions of the patient's history were reviewed and updated as appropriate: allergies, current medications, past family history, past medical history, past social history, past surgical history and problem list       Review of Systems   HENT: Negative for sore throat  Respiratory: Negative for cough and shortness of breath  Cardiovascular: Negative for chest pain and palpitations  Gastrointestinal: Negative for abdominal pain, constipation, diarrhea, nausea and vomiting  Denies GERD   Skin: Negative for rash  Psychiatric/Behavioral: Negative for suicidal ideas          Denies depression and anxiety       Objective:    /70 (BP Location: Left arm, Patient Position: Sitting, Cuff Size: Large)   Pulse 56   Temp 97 8 °F (36 6 °C) (Tympanic)   Resp 18   Ht 5' 10 5" (1 791 m)   Wt (!) 144 kg (316 lb 6 4 oz)   BMI 44 76 kg/m²     Physical Exam   Nursing note and vitals reviewed  Constitutional   General appearance: Abnormal   well developed and morbidly obese  Eyes No conjunctival pallor  Ears, Nose, Mouth, and Throat Oral mucosa moist    Pulmonary   Respiratory effort: No increased work of breathing or signs of respiratory distress  Auscultation of lungs: Clear to auscultation, equal breath sounds bilaterally, no wheezes, no rales, no rhonci  Cardiovascular   Auscultation of heart: Normal rate and rhythm, normal S1 and S2, without murmurs  Examination of extremities for edema and/or varicosities: Normal   no edema  Abdomen   Abdomen: Abnormal   The abdomen was obese  Bowel sounds were normal  The abdomen was soft and nontender  Musculoskeletal   Gait and station: Normal     Psychiatric   Orientation to person, place and time: Normal     Affect: appropriate    25 minute visit, >50% time spent counseling patient on diet behavior and exercise modification for weight loss

## 2019-10-18 NOTE — ASSESSMENT & PLAN NOTE
Bypass by Dr Fanta Narveaz in 2011       Tolerating a regular diet-yes  Eating at least 60 grams of protein per day-yes  Following 30/60 minute rule with liquids-yes  Drinking at least 64 ounces of fluid per day-yes  Drinking carbonated beverages-no  Sufficient exercise-no  Using NSAIDs regularly-no  Using nicotine-no  Using alcohol-yes--very occasionally       Will schedule an appt with kalpana MONTES to review labs and vitamins   Patient is currently taking multivitamin

## 2019-10-31 NOTE — LETTER
10/31/19      Guille LOJA Rebecca  [address] [facility] [address]      We received a referral for you to see one of our Sleep Specialists  We have been unsuccessful in reaching you to schedule this appointment, please contact Dr Teri Frias office at [phone] to schedule your appointment      Igor Murdock/Yejggqwiiz-[phone]  Urbxze-[phone]  [id number]-[phone]    Thank you,    [facility] Staff

## 2019-11-07 ENCOUNTER — OFFICE VISIT (OUTPATIENT)
Dept: INTERNAL MEDICINE CLINIC | Facility: CLINIC | Age: 54
End: 2019-11-07
Payer: COMMERCIAL

## 2019-11-07 VITALS
HEART RATE: 56 BPM | TEMPERATURE: 96.8 F | BODY MASS INDEX: 43.96 KG/M2 | WEIGHT: 314 LBS | RESPIRATION RATE: 16 BRPM | SYSTOLIC BLOOD PRESSURE: 110 MMHG | DIASTOLIC BLOOD PRESSURE: 75 MMHG | OXYGEN SATURATION: 98 % | HEIGHT: 71 IN

## 2019-11-07 DIAGNOSIS — R73.03 PREDIABETES: Primary | ICD-10-CM

## 2019-11-07 DIAGNOSIS — Z98.84 WEIGHT GAIN FOLLOWING GASTRIC BYPASS SURGERY: ICD-10-CM

## 2019-11-07 DIAGNOSIS — R63.5 WEIGHT GAIN FOLLOWING GASTRIC BYPASS SURGERY: ICD-10-CM

## 2019-11-07 DIAGNOSIS — R79.89 HIGH SERUM PARATHYROID HORMONE (PTH): ICD-10-CM

## 2019-11-07 DIAGNOSIS — L43.9 LICHEN PLANUS: ICD-10-CM

## 2019-11-07 DIAGNOSIS — E34.9 ELEVATED PARATHYROID HORMONE: ICD-10-CM

## 2019-11-07 DIAGNOSIS — K92.1 HEMATOCHEZIA: ICD-10-CM

## 2019-11-07 DIAGNOSIS — E55.9 VITAMIN D DEFICIENCY: ICD-10-CM

## 2019-11-07 DIAGNOSIS — E11.9 CONTROLLED TYPE 2 DIABETES MELLITUS WITHOUT COMPLICATION, UNSPECIFIED WHETHER LONG TERM INSULIN USE (HCC): ICD-10-CM

## 2019-11-07 DIAGNOSIS — E66.01 OBESITY, CLASS III, BMI 40-49.9 (MORBID OBESITY) (HCC): ICD-10-CM

## 2019-11-07 DIAGNOSIS — E53.8 VITAMIN B12 DEFICIENCY: ICD-10-CM

## 2019-11-07 DIAGNOSIS — E66.01 MORBID OBESITY WITH BMI OF 40.0-44.9, ADULT (HCC): ICD-10-CM

## 2019-11-07 PROBLEM — M25.529 PAIN IN ELBOW: Status: ACTIVE | Noted: 2019-11-07

## 2019-11-07 PROCEDURE — 99214 OFFICE O/P EST MOD 30 MIN: CPT | Performed by: INTERNAL MEDICINE

## 2019-11-07 PROCEDURE — 3008F BODY MASS INDEX DOCD: CPT | Performed by: INTERNAL MEDICINE

## 2019-11-07 RX ORDER — ERGOCALCIFEROL 1.25 MG/1
CAPSULE ORAL
Qty: 24 CAPSULE | Refills: 2 | Status: SHIPPED | OUTPATIENT
Start: 2019-11-07 | End: 2021-02-18

## 2019-11-07 RX ORDER — CLOBETASOL PROPIONATE 0.5 MG/G
OINTMENT TOPICAL 2 TIMES DAILY
Qty: 60 G | Refills: 1 | Status: SHIPPED | OUTPATIENT
Start: 2019-11-07 | End: 2020-07-06 | Stop reason: SDUPTHER

## 2019-11-07 NOTE — PROGRESS NOTES
Assessment/Plan:  [place] [place] [facility] both sides summer  1  Type 2 diabetes has been well controlled since gastric bypass surgery 2010 will label him prediabetic he is interested in getting his A1c down to normal as IM and will be working on a decreasing his weight sugar white rice white potatoes and white flour will be rechecking in 6 months  2  Patient with the morbid obesity is following gastric bypass surgery will Rx medications patient has been walking daily he has set a goal from self in the summer of walking up to [place]  3  Vitamin-D deficiency will continue vitamin-D replacement and check in 6 months  4  Patient with hematochezia and hard stool did have colonoscopy 2015 will refer back to Gastroenterology for further evaluation and treatment  5  Patient with lichen planus doing well with Temovate ointment refill given            Diagnoses and all orders for this visit:    Prediabetes  -     CBC and differential; Future  -     Comprehensive metabolic panel; Future  -     Hemoglobin A1C; Future  -     LDL cholesterol, direct; Future  -     Lipid Panel with Direct LDL reflex; Future  -     Magnesium; Future  -     Vitamin D 25 hydroxy; Future  -     UA w Reflex to Microscopic w Reflex to Culture; Future  -     Iron Panel (Includes Ferritin, Iron Sat%, Iron, and TIBC); Future  -     Folate; Future  -     Ferritin; Future  -     Copper Level; Future  -     Iron Saturation %; Future  -     Vitamin A; Future  -     Vitamin B1, whole blood; Future  -     Vitamin B12; Future    Vitamin D deficiency  -     CBC and differential; Future  -     Comprehensive metabolic panel; Future  -     Hemoglobin A1C; Future  -     LDL cholesterol, direct; Future  -     Lipid Panel with Direct LDL reflex; Future  -     Magnesium; Future  -     Vitamin D 25 hydroxy; Future  -     UA w Reflex to Microscopic w Reflex to Culture;  Future  -     Iron Panel (Includes Ferritin, Iron Sat%, Iron, and TIBC); Future  -     Folate; Future  -     Ferritin; Future  -     Copper Level; Future  -     Iron Saturation %; Future  -     Vitamin A; Future  -     Vitamin B1, whole blood; Future  -     Vitamin B12; Future  -     ergocalciferol (VITAMIN D2) 50,000 units; 1 2 times a week    Weight gain following gastric bypass surgery  -     CBC and differential; Future  -     Comprehensive metabolic panel; Future  -     Hemoglobin A1C; Future  -     LDL cholesterol, direct; Future  -     Lipid Panel with Direct LDL reflex; Future  -     Magnesium; Future  -     Vitamin D 25 hydroxy; Future  -     UA w Reflex to Microscopic w Reflex to Culture; Future  -     Iron Panel (Includes Ferritin, Iron Sat%, Iron, and TIBC); Future  -     Folate; Future  -     Ferritin; Future  -     Copper Level; Future  -     Iron Saturation %; Future  -     Vitamin A; Future  -     Vitamin B1, whole blood; Future  -     Vitamin B12; Future    Vitamin B12 deficiency  -     CBC and differential; Future  -     Comprehensive metabolic panel; Future  -     Hemoglobin A1C; Future  -     LDL cholesterol, direct; Future  -     Lipid Panel with Direct LDL reflex; Future  -     Magnesium; Future  -     Vitamin D 25 hydroxy; Future  -     UA w Reflex to Microscopic w Reflex to Culture; Future  -     Iron Panel (Includes Ferritin, Iron Sat%, Iron, and TIBC); Future  -     Folate; Future  -     Ferritin; Future  -     Copper Level; Future  -     Iron Saturation %; Future  -     Vitamin A; Future  -     Vitamin B1, whole blood; Future  -     Vitamin B12; Future    Morbid obesity with BMI of 40 0-44 9, adult (HCC)  -     Naltrexone-buPROPion HCl ER 8-90 MG TB12; 1 tab daily x 7 days, 1 tab twice daily x 7 days, 2 tabs in AM and 1 tab in PM x 7 days, then 2 tabs twice daily  -     CBC and differential; Future  -     Comprehensive metabolic panel; Future  -     Hemoglobin A1C; Future  -     LDL cholesterol, direct;  Future  -     Lipid Panel with Direct LDL reflex; Future  -     Magnesium; Future  -     Vitamin D 25 hydroxy; Future  -     UA w Reflex to Microscopic w Reflex to Culture; Future  -     Iron Panel (Includes Ferritin, Iron Sat%, Iron, and TIBC); Future  -     Folate; Future  -     Ferritin; Future  -     Copper Level; Future  -     Iron Saturation %; Future  -     Vitamin A; Future  -     Vitamin B1, whole blood; Future  -     Vitamin B12; Future    Elevated parathyroid hormone  -     CBC and differential; Future  -     Comprehensive metabolic panel; Future  -     Hemoglobin A1C; Future  -     LDL cholesterol, direct; Future  -     Lipid Panel with Direct LDL reflex; Future  -     Magnesium; Future  -     Vitamin D 25 hydroxy; Future  -     UA w Reflex to Microscopic w Reflex to Culture; Future  -     Iron Panel (Includes Ferritin, Iron Sat%, Iron, and TIBC); Future  -     Folate; Future  -     Ferritin; Future  -     Copper Level; Future  -     Iron Saturation %; Future  -     Vitamin A; Future  -     Vitamin B1, whole blood; Future  -     Vitamin B12; Future  -     ergocalciferol (VITAMIN D2) 50,000 units; 1 2 times a week    Controlled type 2 diabetes mellitus without complication, unspecified whether long term insulin use (HCC)  -     CBC and differential; Future  -     Comprehensive metabolic panel; Future  -     Hemoglobin A1C; Future  -     LDL cholesterol, direct; Future  -     Lipid Panel with Direct LDL reflex; Future  -     Magnesium; Future  -     Vitamin D 25 hydroxy; Future  -     UA w Reflex to Microscopic w Reflex to Culture; Future  -     Iron Panel (Includes Ferritin, Iron Sat%, Iron, and TIBC); Future  -     Folate; Future  -     Ferritin; Future  -     Copper Level; Future  -     Iron Saturation %; Future  -     Vitamin A; Future  -     Vitamin B1, whole blood; Future  -     Vitamin B12; Future  -     ergocalciferol (VITAMIN D2) 50,000 units; 1 2 times a week    Lichen planus  -     clobetasol (TEMOVATE) 0 05 % ointment;  Apply topically 2 (two) times a day  -     CBC and differential; Future  -     Comprehensive metabolic panel; Future  -     Hemoglobin A1C; Future  -     LDL cholesterol, direct; Future  -     Lipid Panel with Direct LDL reflex; Future  -     Magnesium; Future  -     Vitamin D 25 hydroxy; Future  -     UA w Reflex to Microscopic w Reflex to Culture; Future  -     Iron Panel (Includes Ferritin, Iron Sat%, Iron, and TIBC); Future  -     Folate; Future  -     Ferritin; Future  -     Copper Level; Future  -     Iron Saturation %; Future  -     Vitamin A; Future  -     Vitamin B1, whole blood; Future  -     Vitamin B12; Future  -     ergocalciferol (VITAMIN D2) 50,000 units; 1 2 times a week    High serum parathyroid hormone (PTH)  -     CBC and differential; Future  -     Comprehensive metabolic panel; Future  -     Hemoglobin A1C; Future  -     LDL cholesterol, direct; Future  -     Lipid Panel with Direct LDL reflex; Future  -     Magnesium; Future  -     Vitamin D 25 hydroxy; Future  -     UA w Reflex to Microscopic w Reflex to Culture; Future  -     Iron Panel (Includes Ferritin, Iron Sat%, Iron, and TIBC); Future  -     Folate; Future  -     Ferritin; Future  -     Copper Level; Future  -     Iron Saturation %; Future  -     Vitamin A; Future  -     Vitamin B1, whole blood; Future  -     Vitamin B12; Future  -     ergocalciferol (VITAMIN D2) 50,000 units; 1 2 times a week    Hematochezia  -     Ambulatory referral to Gastroenterology; Future  -     CBC and differential; Future  -     Comprehensive metabolic panel; Future  -     Hemoglobin A1C; Future  -     LDL cholesterol, direct; Future  -     Lipid Panel with Direct LDL reflex; Future  -     Magnesium; Future  -     Vitamin D 25 hydroxy; Future  -     UA w Reflex to Microscopic w Reflex to Culture; Future  -     Iron Panel (Includes Ferritin, Iron Sat%, Iron, and TIBC); Future  -     Folate; Future  -     Ferritin; Future  -     Copper Level; Future  -     Iron Saturation %;  Future  - Vitamin A; Future  -     Vitamin B1, whole blood; Future  -     Vitamin B12; Future    Obesity, Class III, BMI 40-49 9 (morbid obesity) (HCC)  -     CBC and differential; Future  -     Comprehensive metabolic panel; Future  -     Hemoglobin A1C; Future  -     LDL cholesterol, direct; Future  -     Lipid Panel with Direct LDL reflex; Future  -     Magnesium; Future  -     Vitamin D 25 hydroxy; Future  -     UA w Reflex to Microscopic w Reflex to Culture; Future  -     Iron Panel (Includes Ferritin, Iron Sat%, Iron, and TIBC); Future  -     Folate; Future  -     Ferritin; Future  -     Copper Level; Future  -     Iron Saturation %; Future  -     Vitamin A; Future  -     Vitamin B1, whole blood; Future  -     Vitamin B12; Future        The patient was counseled regarding instructions for management, risk factor reductions, patient and family education,impressions, risks and benefits of treatment options, side effects of medications, importance of compliance with treatment  The treatment plan was reviewed with the patient/guardian and patient/guardian understands and agrees with the treatment plan              Current Outpatient Medications:     CALCIUM CITRATE PO, Take by mouth, Disp: , Rfl:     Cholecalciferol (VITAMIN D3) 2000 units capsule, Take 1 tablet by mouth daily, Disp: , Rfl:     clobetasol (TEMOVATE) 0 05 % ointment, Apply topically 2 (two) times a day, Disp: 60 g, Rfl: 1    Cyanocobalamin (VITAMIN B-12) 5000 MCG SUBL, Place under the tongue, Disp: , Rfl:     docusate sodium (COLACE) 100 mg capsule, Take by mouth daily, Disp: , Rfl:     ergocalciferol (VITAMIN D2) 50,000 units, 1 2 times a week, Disp: 24 capsule, Rfl: 2    Multiple Vitamin (MULTI-VITAMIN DAILY) TABS, Take by mouth, Disp: , Rfl:     Omega-3 1000 MG CAPS, Take by mouth, Disp: , Rfl:     Naltrexone-buPROPion HCl ER 8-90 MG TB12, 1 tab daily x 7 days, 1 tab twice daily x 7 days, 2 tabs in AM and 1 tab in PM x 7 days, then 2 tabs twice daily, Disp: 120 tablet, Rfl: 0    Current Facility-Administered Medications:     cyanocobalamin injection 1,000 mcg, 1,000 mcg, Intramuscular, Q30 Days, NEAL Lundberg, 1,000 mcg at 04/25/19 1106    Subjective:      Patient ID: Nahomy Amaro is a 62 y o  male  Passed blood in stool constipation      The following portions of the patient's history were reviewed and updated as appropriate:   He has a past medical history of Arthritis, History of bariatric surgery, Morbid obesity due to excess calories ([facility] [address] ), Postsurgical malabsorption, Type 2 diabetes mellitus ([facility] [address] ), Vitamin B1 deficiency, and Vitamin D deficiency  ,  does not have any pertinent problems on file  ,   has a past surgical history that includes Gastric bypass and Hernia repair  ,  family history includes Bone cancer in his father; Heart disease in his mother; Heart failure in his mother; Hypertension in his mother; Throat cancer in his father  ,   reports that he quit smoking about 9 years ago  He has never used smokeless tobacco  He reports that he drinks alcohol  He reports that he does not use drugs  ,  is allergic to prunus persica       Review of Systems   Constitutional: Negative for appetite change, chills, fatigue, fever and unexpected weight change  HENT: Negative for congestion, ear pain, facial swelling, hearing loss, mouth sores, nosebleeds, postnasal drip, rhinorrhea, sinus pain, sore throat, trouble swallowing and voice change  Eyes: Negative for pain, discharge, redness and visual disturbance  Respiratory: Negative for apnea, chest tightness, shortness of breath, wheezing and stridor  Cardiovascular: Negative for chest pain, palpitations and leg swelling  Gastrointestinal: Positive for blood in stool and constipation  Negative for abdominal distention, abdominal pain, diarrhea and vomiting  Endocrine: Negative for cold intolerance, heat intolerance, polydipsia, polyphagia and polyuria     Genitourinary: Negative for difficulty urinating, dysuria, flank pain, frequency, genital sores, hematuria and urgency  Musculoskeletal: Negative for arthralgias and back pain  Skin: Negative for rash and wound  Allergic/Immunologic: Negative for environmental allergies, food allergies and immunocompromised state  Neurological: Negative for dizziness, tremors, seizures, syncope, facial asymmetry, speech difficulty, weakness, light-headedness, numbness and headaches  Hematological: Negative for adenopathy  Does not bruise/bleed easily  Psychiatric/Behavioral: Negative for agitation, behavioral problems, dysphoric mood, hallucinations, self-injury, sleep disturbance and suicidal ideas  The patient is not hyperactive            Objective:  /75 (BP Location: Left arm, Patient Position: Sitting)   Pulse 56   Temp (!) 96 8 °F (36 °C) (Tympanic)   Resp 16   Ht 5' 10 5" (1 791 m)   Wt (!) 142 kg (314 lb)   SpO2 98%   BMI 44 42 kg/m²     Lab Review  Orders Only on 10/11/2019   Component Date Value    Hemoglobin A1C 10/11/2019 5 9    Orders Only on 10/11/2019   Component Date Value    White Blood Cell Count 10/11/2019 6 7     Red Blood Cell Count 10/11/2019 4 50     Hemoglobin 10/11/2019 13 2     HCT 10/11/2019 40 5     MCV 10/11/2019 90     MCH 10/11/2019 29 3     MCHC 10/11/2019 32 6     RDW 10/11/2019 13 6     Platelet Count 97/55/3436 268     Neutrophils 10/11/2019 50     Lymphocytes 10/11/2019 38     Monocytes 10/11/2019 9     Eosinophils 10/11/2019 3     Basophils PCT 10/11/2019 0     Neutrophils (Absolute) 10/11/2019 3 3     Lymphocytes (Absolute) 10/11/2019 2 6     Monocytes (Absolute) 10/11/2019 0 6     Eosinophils (Absolute) 10/11/2019 0 2     Basophils ABS 10/11/2019 0 0     Immature Granulocytes 10/11/2019 0     Immature Granulocytes (A* 10/11/2019 0 0     Glucose, Random 10/11/2019 87     BUN 10/11/2019 11     Creatinine 10/11/2019 0 85     eGFR Non African American 10/11/2019 97     eGFR  10/11/2019 113     SL AMB BUN/CREATININE RA* 10/11/2019 13     Sodium 10/11/2019 139     Potassium 10/11/2019 4 3     Chloride 10/11/2019 103     CO2 10/11/2019 23     CALCIUM 10/11/2019 9 0     Protein, Total 10/11/2019 6 7     Albumin 10/11/2019 4 1     Globulin, Total 10/11/2019 2 6     Albumin/Globulin Ratio 10/11/2019 1 6     TOTAL BILIRUBIN 10/11/2019 0 3     Alk Phos Isoenzymes 10/11/2019 76     AST 10/11/2019 18     ALT 10/11/2019 24     Cholesterol, Total 10/11/2019 148     Triglycerides 10/11/2019 48     HDL 10/11/2019 49     VLDL Cholesterol Calcula* 10/11/2019 10     LDL Direct 10/11/2019 89     Hemoglobin A1C 10/11/2019 5 9*    Estimated Average Glucose 10/11/2019 123     TSH 10/11/2019 2 020     PTH, Intact 10/11/2019 61    Orders Only on 07/03/2019   Component Date Value    Right Eye Diabetic Retin* 07/03/2019 Mild     Left Eye Diabetic Retino* 07/03/2019 Mild          Imaging  @[id number]@     No orders to display     No results found for this or any previous visit  Physical Exam   Constitutional: He is oriented to person, place, and time  He appears well-developed  Morbid obesity   HENT:   Right Ear: External ear normal    Left Ear: External ear normal    Eyes: Right eye exhibits no discharge  Left eye exhibits no discharge  No scleral icterus  Neck: Carotid bruit is not present  No tracheal deviation present  No thyroid mass and no thyromegaly present  Cardiovascular: Normal rate, regular rhythm, normal heart sounds and intact distal pulses  Exam reveals no gallop and no friction rub  No murmur heard  Pulmonary/Chest: No respiratory distress  He has no wheezes  He has no rales  Musculoskeletal: He exhibits no edema  Lymphadenopathy:     He has no cervical adenopathy  Neurological: He is alert and oriented to person, place, and time  Coordination normal    Psychiatric: He has a normal mood and affect   His behavior is normal  Judgment and thought content normal    Nursing note and vitals reviewed  BMI Counseling: Body mass index is 44 42 kg/m²  The BMI is above normal  Nutrition recommendations include reducing portion sizes, decreasing overall calorie intake, 3-5 servings of fruits/vegetables daily, reducing fast food intake, consuming healthier snacks, decreasing soda and/or juice intake and moderation in carbohydrate intake  Exercise recommendations include moderate aerobic physical activity for 150 minutes/week and exercising 3-5 times per week

## 2019-11-07 NOTE — PATIENT INSTRUCTIONS
Obesity   AMBULATORY CARE:   Obesity  is when your body mass index (BMI) is greater than 30  Your healthcare provider will use your height and weight to measure your BMI  The risks of obesity include  many health problems, such as injuries or physical disability  You may need tests to check for the following:  · Diabetes     · High blood pressure or high cholesterol     · Heart disease     · Gallbladder or liver disease     · Cancer of the colon, breast, prostate, liver, or kidney     · Sleep apnea     · Arthritis or gout  Seek care immediately if:   · You have a severe headache, confusion, or difficulty speaking  · You have weakness on one side of your body  · You have chest pain, sweating, or shortness of breath  Contact your healthcare provider if:   · You have symptoms of gallbladder or liver disease, such as pain in your upper abdomen  · You have knee or hip pain and discomfort while walking  · You have symptoms of diabetes, such as intense hunger and thirst, and frequent urination  · You have symptoms of sleep apnea, such as snoring or daytime sleepiness  · You have questions or concerns about your condition or care  Treatment for obesity  focuses on helping you lose weight to improve your health  Even a small decrease in BMI can reduce the risk for many health problems  Your healthcare provider will help you set a weight-loss goal   · Lifestyle changes  are the first step in treating obesity  These include making healthy food choices and getting regular physical activity  Your healthcare provider may suggest a weight-loss program that involves coaching, education, and therapy  · Medicine  may help you lose weight when it is used with a healthy diet and physical activity  · Surgery  can help you lose weight if you are very obese and have other health problems  There are several types of weight-loss surgery  Ask your healthcare provider for more information    Be successful losing weight:   · Set small, realistic goals  An example of a small goal is to walk for 20 minutes 5 days a week  Anther goal is to lose 5% of your body weight  · Tell friends, family members, and coworkers about your goals  and ask for their support  Ask a friend to lose weight with you, or join a weight-loss support group  · Identify foods or triggers that may cause you to overeat , and find ways to avoid them  Remove tempting high-calorie foods from your home and workplace  Place a bowl of fresh fruit on your kitchen counter  If stress causes you to eat, then find other ways to cope with stress  · Keep a diary to track what you eat and drink  Also write down how many minutes of physical activity you do each day  Weigh yourself once a week and record it in your diary  Eating changes: You will need to eat 500 to 1,000 fewer calories each day than you currently eat to lose 1 to 2 pounds a week  The following changes will help you cut calories:  · Eat smaller portions  Use small plates, no larger than 9 inches in diameter  Fill your plate half full of fruits and vegetables  Measure your food using measuring cups until you know what a serving size looks like  · Eat 3 meals and 1 or 2 snacks each day  Plan your meals in advance  Cristal Meals and eat at home most of the time  Eat slowly  · Eat fruits and vegetables at every meal   They are low in calories and high in fiber, which makes you feel full  Do not add butter, margarine, or cream sauce to vegetables  Use herbs to season steamed vegetables  · Eat less fat and fewer fried foods  Eat more baked or grilled chicken and fish  These protein sources are lower in calories and fat than red meat  Limit fast food  Dress your salads with olive oil and vinegar instead of bottled dressing  · Limit the amount of sugar you eat  Do not drink sugary beverages  Limit alcohol  Activity changes:  Physical activity is good for your body in many ways   It helps you burn calories and build strong muscles  It decreases stress and depression, and improves your mood  It can also help you sleep better  Talk to your healthcare provider before you begin an exercise program   · Exercise for at least 30 minutes 5 days a week  Start slowly  Set aside time each day for physical activity that you enjoy and that is convenient for you  It is best to do both weight training and an activity that increases your heart rate, such as walking, bicycling, or swimming  · Find ways to be more active  Do yard work and housecleaning  Walk up the stairs instead of using elevators  Spend your leisure time going to events that require walking, such as outdoor festivals or fairs  This extra physical activity can help you lose weight and keep it off  Follow up with your healthcare provider as directed: You may need to meet with a dietitian  Write down your questions so you remember to ask them during your visits  © 2017 [employer] Information is for End User's use only and may not be sold, redistributed or otherwise used for commercial purposes  All illustrations and images included in CareNotes® are the copyrighted property of [employer]  or [employer]  The above information is an  only  It is not intended as medical advice for individual conditions or treatments  Talk to your doctor, nurse or pharmacist before following any medical regimen to see if it is safe and effective for you

## 2019-12-10 PROBLEM — Z48.815 ENCOUNTER FOR SURGICAL AFTERCARE FOLLOWING SURGERY OF DIGESTIVE SYSTEM: Status: ACTIVE | Noted: 2019-12-10

## 2019-12-12 DIAGNOSIS — E66.01 MORBID OBESITY WITH BMI OF 40.0-44.9, ADULT (HCC): ICD-10-CM

## 2020-06-19 ENCOUNTER — APPOINTMENT (OUTPATIENT)
Dept: LAB | Facility: HOSPITAL | Age: 55
End: 2020-06-19
Attending: INTERNAL MEDICINE
Payer: COMMERCIAL

## 2020-06-19 DIAGNOSIS — L43.9 LICHEN PLANUS: ICD-10-CM

## 2020-06-19 DIAGNOSIS — K92.1 HEMATOCHEZIA: ICD-10-CM

## 2020-06-19 DIAGNOSIS — E66.01 MORBID OBESITY WITH BMI OF 40.0-44.9, ADULT (HCC): ICD-10-CM

## 2020-06-19 DIAGNOSIS — E53.8 VITAMIN B12 DEFICIENCY: ICD-10-CM

## 2020-06-19 DIAGNOSIS — R63.5 WEIGHT GAIN FOLLOWING GASTRIC BYPASS SURGERY: ICD-10-CM

## 2020-06-19 DIAGNOSIS — R73.03 PREDIABETES: ICD-10-CM

## 2020-06-19 DIAGNOSIS — E66.01 OBESITY, CLASS III, BMI 40-49.9 (MORBID OBESITY) (HCC): ICD-10-CM

## 2020-06-19 DIAGNOSIS — Z98.84 WEIGHT GAIN FOLLOWING GASTRIC BYPASS SURGERY: ICD-10-CM

## 2020-06-19 DIAGNOSIS — E34.9 ELEVATED PARATHYROID HORMONE: ICD-10-CM

## 2020-06-19 DIAGNOSIS — E55.9 VITAMIN D DEFICIENCY: ICD-10-CM

## 2020-06-19 DIAGNOSIS — E11.9 CONTROLLED TYPE 2 DIABETES MELLITUS WITHOUT COMPLICATION, UNSPECIFIED WHETHER LONG TERM INSULIN USE (HCC): ICD-10-CM

## 2020-06-19 DIAGNOSIS — R79.89 HIGH SERUM PARATHYROID HORMONE (PTH): ICD-10-CM

## 2020-06-19 LAB
25(OH)D3 SERPL-MCNC: 34.7 NG/ML (ref 30–100)
ALBUMIN SERPL BCP-MCNC: 3.5 G/DL (ref 3.5–5)
ALP SERPL-CCNC: 67 U/L (ref 46–116)
ALT SERPL W P-5'-P-CCNC: 30 U/L (ref 12–78)
ANION GAP SERPL CALCULATED.3IONS-SCNC: 7 MMOL/L (ref 4–13)
AST SERPL W P-5'-P-CCNC: 17 U/L (ref 5–45)
BASOPHILS # BLD AUTO: 0.03 THOUSANDS/ΜL (ref 0–0.1)
BASOPHILS NFR BLD AUTO: 1 % (ref 0–1)
BILIRUB SERPL-MCNC: 0.3 MG/DL (ref 0.2–1)
BILIRUB UR QL STRIP: NEGATIVE
BUN SERPL-MCNC: 11 MG/DL (ref 5–25)
CALCIUM SERPL-MCNC: 8.2 MG/DL (ref 8.3–10.1)
CHLORIDE SERPL-SCNC: 110 MMOL/L (ref 100–108)
CHOLEST SERPL-MCNC: 129 MG/DL (ref 50–200)
CLARITY UR: CLEAR
CO2 SERPL-SCNC: 27 MMOL/L (ref 21–32)
COLOR UR: YELLOW
CREAT SERPL-MCNC: 0.82 MG/DL (ref 0.6–1.3)
EOSINOPHIL # BLD AUTO: 0.18 THOUSAND/ΜL (ref 0–0.61)
EOSINOPHIL NFR BLD AUTO: 3 % (ref 0–6)
ERYTHROCYTE [DISTWIDTH] IN BLOOD BY AUTOMATED COUNT: 13.4 % (ref 11.6–15.1)
EST. AVERAGE GLUCOSE BLD GHB EST-MCNC: 126 MG/DL
FERRITIN SERPL-MCNC: 10 NG/ML (ref 8–388)
FOLATE SERPL-MCNC: 6.9 NG/ML (ref 3.1–17.5)
GFR SERPL CREATININE-BSD FRML MDRD: 113 ML/MIN/1.73SQ M
GLUCOSE P FAST SERPL-MCNC: 81 MG/DL (ref 65–99)
GLUCOSE UR STRIP-MCNC: NEGATIVE MG/DL
HBA1C MFR BLD: 6 %
HCT VFR BLD AUTO: 40.7 % (ref 36.5–49.3)
HDLC SERPL-MCNC: 54 MG/DL
HGB BLD-MCNC: 12.9 G/DL (ref 12–17)
HGB UR QL STRIP.AUTO: NEGATIVE
IMM GRANULOCYTES # BLD AUTO: 0.02 THOUSAND/UL (ref 0–0.2)
IMM GRANULOCYTES NFR BLD AUTO: 0 % (ref 0–2)
IRON SATN MFR SERPL: 17 %
IRON SERPL-MCNC: 73 UG/DL (ref 65–175)
KETONES UR STRIP-MCNC: NEGATIVE MG/DL
LDLC SERPL CALC-MCNC: 65 MG/DL (ref 0–100)
LDLC SERPL DIRECT ASSAY-MCNC: 73 MG/DL (ref 0–100)
LEUKOCYTE ESTERASE UR QL STRIP: NEGATIVE
LYMPHOCYTES # BLD AUTO: 2.46 THOUSANDS/ΜL (ref 0.6–4.47)
LYMPHOCYTES NFR BLD AUTO: 38 % (ref 14–44)
MAGNESIUM SERPL-MCNC: 1.9 MG/DL (ref 1.6–2.6)
MCH RBC QN AUTO: 30.1 PG (ref 26.8–34.3)
MCHC RBC AUTO-ENTMCNC: 31.7 G/DL (ref 31.4–37.4)
MCV RBC AUTO: 95 FL (ref 82–98)
MONOCYTES # BLD AUTO: 0.6 THOUSAND/ΜL (ref 0.17–1.22)
MONOCYTES NFR BLD AUTO: 9 % (ref 4–12)
NEUTROPHILS # BLD AUTO: 3.13 THOUSANDS/ΜL (ref 1.85–7.62)
NEUTS SEG NFR BLD AUTO: 49 % (ref 43–75)
NITRITE UR QL STRIP: NEGATIVE
NRBC BLD AUTO-RTO: 0 /100 WBCS
PH UR STRIP.AUTO: 6 [PH]
PLATELET # BLD AUTO: 211 THOUSANDS/UL (ref 149–390)
PMV BLD AUTO: 9.9 FL (ref 8.9–12.7)
POTASSIUM SERPL-SCNC: 4.1 MMOL/L (ref 3.5–5.3)
PROT SERPL-MCNC: 7.1 G/DL (ref 6.4–8.2)
PROT UR STRIP-MCNC: NEGATIVE MG/DL
RBC # BLD AUTO: 4.29 MILLION/UL (ref 3.88–5.62)
SODIUM SERPL-SCNC: 144 MMOL/L (ref 136–145)
SP GR UR STRIP.AUTO: 1.02 (ref 1–1.03)
TIBC SERPL-MCNC: 425 UG/DL (ref 250–450)
TRIGL SERPL-MCNC: 50 MG/DL
UROBILINOGEN UR QL STRIP.AUTO: 0.2 E.U./DL
VIT B12 SERPL-MCNC: 432 PG/ML (ref 100–900)
WBC # BLD AUTO: 6.42 THOUSAND/UL (ref 4.31–10.16)

## 2020-06-19 PROCEDURE — 3044F HG A1C LEVEL LT 7.0%: CPT | Performed by: INTERNAL MEDICINE

## 2020-06-19 PROCEDURE — 83735 ASSAY OF MAGNESIUM: CPT

## 2020-06-19 PROCEDURE — 82728 ASSAY OF FERRITIN: CPT

## 2020-06-19 PROCEDURE — 83721 ASSAY OF BLOOD LIPOPROTEIN: CPT

## 2020-06-19 PROCEDURE — 82746 ASSAY OF FOLIC ACID SERUM: CPT

## 2020-06-19 PROCEDURE — 83036 HEMOGLOBIN GLYCOSYLATED A1C: CPT

## 2020-06-19 PROCEDURE — 82607 VITAMIN B-12: CPT

## 2020-06-19 PROCEDURE — 83540 ASSAY OF IRON: CPT

## 2020-06-19 PROCEDURE — 84425 ASSAY OF VITAMIN B-1: CPT

## 2020-06-19 PROCEDURE — 82306 VITAMIN D 25 HYDROXY: CPT

## 2020-06-19 PROCEDURE — 81003 URINALYSIS AUTO W/O SCOPE: CPT

## 2020-06-19 PROCEDURE — 36415 COLL VENOUS BLD VENIPUNCTURE: CPT

## 2020-06-19 PROCEDURE — 80053 COMPREHEN METABOLIC PANEL: CPT

## 2020-06-19 PROCEDURE — 83550 IRON BINDING TEST: CPT

## 2020-06-19 PROCEDURE — 84590 ASSAY OF VITAMIN A: CPT

## 2020-06-19 PROCEDURE — 80061 LIPID PANEL: CPT

## 2020-06-19 PROCEDURE — 82525 ASSAY OF COPPER: CPT

## 2020-06-19 PROCEDURE — 85025 COMPLETE CBC W/AUTO DIFF WBC: CPT

## 2020-06-23 ENCOUNTER — TELEPHONE (OUTPATIENT)
Dept: INTERNAL MEDICINE CLINIC | Facility: CLINIC | Age: 55
End: 2020-06-23

## 2020-06-23 DIAGNOSIS — Z11.59 ENCOUNTER FOR HEPATITIS C SCREENING TEST FOR LOW RISK PATIENT: Primary | ICD-10-CM

## 2020-06-23 DIAGNOSIS — R79.0 ABNORMAL BLOOD LEVEL OF COPPER: ICD-10-CM

## 2020-06-23 DIAGNOSIS — Z11.4 SCREENING FOR HIV WITHOUT PRESENCE OF RISK FACTORS: ICD-10-CM

## 2020-06-23 LAB — COPPER SERPL-MCNC: 171 UG/DL (ref 72–166)

## 2020-06-24 LAB
VIT A SERPL-MCNC: 22.3 UG/DL (ref 20.1–62)
VIT B1 BLD-SCNC: 76.9 NMOL/L (ref 66.5–200)

## 2020-06-27 ENCOUNTER — TELEPHONE (OUTPATIENT)
Dept: OTHER | Facility: OTHER | Age: 55
End: 2020-06-27

## 2020-07-01 ENCOUNTER — TELEPHONE (OUTPATIENT)
Dept: INTERNAL MEDICINE CLINIC | Facility: CLINIC | Age: 55
End: 2020-07-01

## 2020-07-01 NOTE — TELEPHONE ENCOUNTER
Good Afternoon, Mr Macias called requesting COVID antibody Screening, As well as A1-C be added to current labs that will be completed Saturday Morning  Patient denies any current Sx  However does have some concern due to work environment   Patient states grandson is visiting, would like some piece of mind during visit       Tel# [phone]

## 2020-07-06 ENCOUNTER — TELEMEDICINE (OUTPATIENT)
Dept: INTERNAL MEDICINE CLINIC | Facility: CLINIC | Age: 55
End: 2020-07-06
Payer: COMMERCIAL

## 2020-07-06 VITALS — HEIGHT: 71 IN | BODY MASS INDEX: 41.02 KG/M2 | TEMPERATURE: 98.1 F | WEIGHT: 293 LBS

## 2020-07-06 DIAGNOSIS — Z71.89 EDUCATED ABOUT COVID-19 VIRUS INFECTION: ICD-10-CM

## 2020-07-06 DIAGNOSIS — L43.9 LICHEN PLANUS: ICD-10-CM

## 2020-07-06 DIAGNOSIS — Z20.828 EXPOSURE TO SARS-ASSOCIATED CORONAVIRUS: Primary | ICD-10-CM

## 2020-07-06 DIAGNOSIS — E66.01 MORBID OBESITY WITH BMI OF 40.0-44.9, ADULT (HCC): ICD-10-CM

## 2020-07-06 PROCEDURE — 99214 OFFICE O/P EST MOD 30 MIN: CPT | Performed by: NURSE PRACTITIONER

## 2020-07-06 RX ORDER — CLOBETASOL PROPIONATE 0.5 MG/G
OINTMENT TOPICAL 2 TIMES DAILY
Qty: 60 G | Refills: 1 | Status: SHIPPED | OUTPATIENT
Start: 2020-07-06 | End: 2021-02-18 | Stop reason: SDUPTHER

## 2020-07-06 NOTE — PROGRESS NOTES
COVID-19 Virtual Visit     Assessment/Plan:    Problem List Items Addressed This Visit        Musculoskeletal and Integument    Lichen planus    Relevant Medications    clobetasol (TEMOVATE) 0 05 % ointment       Other    Educated about COVID-19 virus infection      Other Visit Diagnoses     Exposure to SARS-associated coronavirus    -  Primary    Relevant Orders    MISC COVID-19 TEST- Collected at [facility] or [facility]    Morbid obesity with BMI of 40 0-44 9, adult ([facility] [address] )        Relevant Medications    Naltrexone-buPROPion HCl ER 8-90 MG TB12        This virtual check-in was done via [employer] Duo and patient was informed that this is not a secure, HIPAA-complaint platform  He agrees to proceed     Disposition:      Has questions about antibody testing- risks and benefits discussed  Does not have symptoms of Covid 19  Will be visiting family soon  Encouraged to mask and social distance as much as possible  If he changes his mind about antibody testing, will call us  I discussed COVID-19 antibody testing with Guille  I indicated that there are important limitations of this test   Antibody tests detect the body's immune response to infection rather than detecting the virus itself  It can take weeks for antibodies to show up in the blood  For this reason, antibody tests are not used to detect or manage infection  They may be better for tracking infections in the community  Current antibody tests have not undergone the usual strict FDA approval process  The FDA decided to make it easier to have more tests available  The result is that these new tests may not be reliable  Since COVID-19 antibody testing is so new, we do not know how accurate it is  One can have a positive test and have not actually been infected  One can also have a negative test despite having been infected  There are other coronaviruses that are known to cause the common cold   Many people may have antibodies to these other viruses that could make the COVID-19 antibody test positive  More importantly, even if the test positive, this does not necessarily indicate immunity to the virus  After this discussion, César Dejesus has decided to forego COVID-19 antibody testing  and call the office if he changes his mind  As a result of this visit, I have not referred the patient for further respiratory evaluation  I spent 20 minutes directly with the patient during this visit    Encounter provider [place], [address] [place] [address]    Provider located at [address]  [place] [phone]    Recent Visits  Date Type Provider Dept   07/01/20 Telephone [facility] Ardelia Pall recent visits within past 7 days and meeting all other requirements     Today's Visits  Date Type Provider Dept   07/06/20 Telemedicine [place], [address] [place] [address] Internal Med Ardelia Pall today's visits and meeting all other requirements     Future Appointments  No visits were found meeting these conditions  Showing future appointments within next 150 days and meeting all other requirements        Patient agrees to participate in a virtual check in via telephone or video visit instead of presenting to the office to address urgent/immediate medical needs  Patient is aware this is a billable service  After connecting through [employer], the patient was identified by name and date of birth  Guille Macias was informed that this was a telemedicine visit and that the exam was being conducted confidentially over secure lines  My office door was closed  No one else was in the room  Guille Macias acknowledged consent and understanding of privacy and security of the telemedicine visit  I informed the patient that I have reviewed his record in Epic and presented the opportunity for him to ask any questions regarding the visit today   The patient agreed to participate  Mario  Guilleraiza Macias is a 60 y o  male who is concerned about COVID-19  He reports no symptoms, has questions about antibody testing  He has not experienced no sx He has not had contact with a person who is under investigation for or who is positive for COVID-19 within the last 14 days  He has not been hospitalized recently for fever and/or lower respiratory symptoms  Past Medical History:   Diagnosis Date    Arthritis     Last assessed: 5/21/12    History of bariatric surgery     Morbid obesity due to excess calories ([facility] )     Last assessed: 5/21/12    Postsurgical malabsorption     Type 2 diabetes mellitus ([facility] )     Last assessed: 6/5/15    Vitamin B1 deficiency     Last assessed: 9/29/14    Vitamin D deficiency     Last assessed: 9/29/14       Past Surgical History:   Procedure Laterality Date    GASTRIC BYPASS      For Morbid Obesity  Managed by: Lanita Boas   Last assessed: 6/16/14    HERNIA REPAIR         Current Outpatient Medications   Medication Sig Dispense Refill    CALCIUM CITRATE PO Take by mouth      Cholecalciferol (VITAMIN D3) 2000 units capsule Take 1 tablet by mouth daily      clobetasol (TEMOVATE) 0 05 % ointment Apply topically 2 (two) times a day 60 g 1    Cyanocobalamin (VITAMIN B-12) 5000 MCG SUBL Place under the tongue      docusate sodium (COLACE) 100 mg capsule Take by mouth daily      ergocalciferol (ERGOCALCIFEROL) 1 25 MG (00654 UT) capsule Vitamin D2 1,250 mcg (50,000 unit) capsule      ergocalciferol (VITAMIN D2) 50,000 units 1 2 times a week 24 capsule 2    Multiple Vitamin (MULTI-VITAMIN DAILY) TABS Take by mouth      Naltrexone-buPROPion HCl ER 8-90 MG TB12 1 tab daily x 7 days, 1 tab twice daily x 7 days, 2 tabs in AM and 1 tab in PM x 7 days, then 2 tabs twice daily 120 tablet 0    Omega-3 1000 MG CAPS Take by mouth       Current Facility-Administered Medications   Medication Dose Route Frequency Provider Last Rate Last Dose    cyanocobalamin injection 1,000 mcg  1,000 mcg Intramuscular Q30 Days Omid ANN MARIE ChungNU   1,000 mcg at 04/25/19 1106        Allergies   Allergen Reactions    Prunus Persica Anaphylaxis     Also frankie, plums, apricot, nectarine       Review of Systems   Constitutional: Negative  Respiratory: Negative  Cardiovascular: Negative  Musculoskeletal: Negative  Psychiatric/Behavioral: Negative  Video Exam    Vitals:    07/06/20 0847   Temp: 98 1 °F (36 7 °C)   TempSrc: Oral   Weight: 133 kg (293 lb)   Height: 5' 10 5" (1 791 m)         Guille appears healthy, alert, no distress, cooperative, smiling  Physical Exam   Constitutional: He is oriented to person, place, and time  No distress  Neurological: He is alert and oriented to person, place, and time  Psychiatric: He has a normal mood and affect  His behavior is normal  Judgment and thought content normal         VIRTUAL VISIT DISCLAIMER    Guille Macias acknowledges that he has consented to an online visit or consultation  He understands that the online visit is based solely on information provided by him, and that, in the absence of a face-to-face physical evaluation by the physician, the diagnosis he receives is both limited and provisional in terms of accuracy and completeness  This is not intended to replace a full medical face-to-face evaluation by the physician  Guille Macias understands and accepts these terms

## 2020-07-06 NOTE — PATIENT INSTRUCTIONS
Has questions about antibody testing- risks and benefits discussed  Does not have symptoms of Covid 19  Will be visiting family soon  Encouraged to mask and social distance as much as possible  If he changes his mind about antibody testing, will call us

## 2020-07-11 ENCOUNTER — APPOINTMENT (OUTPATIENT)
Dept: LAB | Facility: HOSPITAL | Age: 55
End: 2020-07-11
Attending: INTERNAL MEDICINE
Payer: COMMERCIAL

## 2020-07-11 DIAGNOSIS — Z11.4 SCREENING FOR HIV WITHOUT PRESENCE OF RISK FACTORS: ICD-10-CM

## 2020-07-11 DIAGNOSIS — Z11.59 ENCOUNTER FOR HEPATITIS C SCREENING TEST FOR LOW RISK PATIENT: ICD-10-CM

## 2020-07-11 DIAGNOSIS — R79.0 ABNORMAL BLOOD LEVEL OF COPPER: ICD-10-CM

## 2020-07-11 LAB — HCV AB SER QL: NORMAL

## 2020-07-11 PROCEDURE — 87389 HIV-1 AG W/HIV-1&-2 AB AG IA: CPT

## 2020-07-11 PROCEDURE — 36415 COLL VENOUS BLD VENIPUNCTURE: CPT

## 2020-07-11 PROCEDURE — 82525 ASSAY OF COPPER: CPT

## 2020-07-11 PROCEDURE — 86803 HEPATITIS C AB TEST: CPT

## 2020-07-12 LAB — HIV 1+2 AB+HIV1 P24 AG SERPL QL IA: NORMAL

## 2020-07-14 LAB — COPPER SERPL-MCNC: 83 UG/DL (ref 72–166)

## 2020-07-17 ENCOUNTER — OFFICE VISIT (OUTPATIENT)
Dept: INTERNAL MEDICINE CLINIC | Facility: CLINIC | Age: 55
End: 2020-07-17
Payer: COMMERCIAL

## 2020-07-17 VITALS
RESPIRATION RATE: 16 BRPM | HEIGHT: 70 IN | OXYGEN SATURATION: 97 % | HEART RATE: 55 BPM | WEIGHT: 313 LBS | SYSTOLIC BLOOD PRESSURE: 120 MMHG | BODY MASS INDEX: 44.81 KG/M2 | DIASTOLIC BLOOD PRESSURE: 60 MMHG

## 2020-07-17 DIAGNOSIS — E66.01 MORBID OBESITY WITH BMI OF 40.0-44.9, ADULT (HCC): ICD-10-CM

## 2020-07-17 DIAGNOSIS — Z12.11 SCREENING FOR MALIGNANT NEOPLASM OF COLON: Primary | ICD-10-CM

## 2020-07-17 DIAGNOSIS — Z12.5 SCREENING FOR MALIGNANT NEOPLASM OF PROSTATE: ICD-10-CM

## 2020-07-17 DIAGNOSIS — E66.01 OBESITY, CLASS III, BMI 40-49.9 (MORBID OBESITY) (HCC): ICD-10-CM

## 2020-07-17 DIAGNOSIS — R73.03 PREDIABETES: ICD-10-CM

## 2020-07-17 DIAGNOSIS — E55.9 VITAMIN D DEFICIENCY: ICD-10-CM

## 2020-07-17 DIAGNOSIS — E53.8 VITAMIN B12 DEFICIENCY: ICD-10-CM

## 2020-07-17 PROBLEM — M25.529 PAIN IN ELBOW: Status: RESOLVED | Noted: 2019-11-07 | Resolved: 2020-07-17

## 2020-07-17 PROCEDURE — 1036F TOBACCO NON-USER: CPT | Performed by: NURSE PRACTITIONER

## 2020-07-17 PROCEDURE — 3074F SYST BP LT 130 MM HG: CPT | Performed by: NURSE PRACTITIONER

## 2020-07-17 PROCEDURE — 99214 OFFICE O/P EST MOD 30 MIN: CPT | Performed by: NURSE PRACTITIONER

## 2020-07-17 PROCEDURE — 3078F DIAST BP <80 MM HG: CPT | Performed by: NURSE PRACTITIONER

## 2020-07-17 PROCEDURE — 3044F HG A1C LEVEL LT 7.0%: CPT | Performed by: NURSE PRACTITIONER

## 2020-07-17 PROCEDURE — 3008F BODY MASS INDEX DOCD: CPT | Performed by: NURSE PRACTITIONER

## 2020-07-17 NOTE — PATIENT INSTRUCTIONS
1  Prediabetes- A1c 6, will recheck in six months  Please limit white bread/pasta/rice/potatoes  2  Obesity- s/p gastric bypass, will start Contrave  Bariatric surgical labs reviewed and copper level recheck is normal   3  Vitamin D Deficiency- At goal, continue Vit D supplement  4  Vitamin B12 Deficiency- At goal   5  Due for screening colonoscopy- order given  Please call the office with any concerns  Follow up with me as needed and with Dr Daksha Granados in six months

## 2020-07-17 NOTE — PROGRESS NOTES
BMI Counseling: Body mass index is 44 91 kg/m²  The BMI is above normal  Nutrition recommendations include decreasing portion sizes, encouraging healthy choices of fruits and vegetables, decreasing fast food intake, consuming healthier snacks, limiting drinks that contain sugar, moderation in carbohydrate intake, increasing intake of lean protein, reducing intake of saturated and trans fat and reducing intake of cholesterol  Exercise recommendations include exercising 3-5 times per week  No pharmacotherapy was ordered  Patient referred to PCP due to patient being overweight  Assessment/Plan:    1  Prediabetes- A1c 6, will recheck in six months  Please limit white bread/pasta/rice/potatoes  2  Obesity- s/p gastric bypass, will start Contrave  Bariatric surgical labs reviewed and copper level recheck is normal   3  Vitamin D Deficiency- At goal, continue Vit D supplement  4  Vitamin B12 Deficiency- At goal   5  Due for screening colonoscopy- order given  Please call the office with any concerns  Follow up with me as needed and with Dr Dexter Delarosa in six months  Diagnoses and all orders for this visit:    Screening for malignant neoplasm of colon  -     Ambulatory referral for colonoscopy; Future  -     CBC and differential; Future  -     Comprehensive metabolic panel; Future  -     HEMOGLOBIN A1C W/ EAG ESTIMATION; Future  -     Iron; Future  -     Iron Saturation %; Future  -     Lipid panel; Future  -     TSH, 3rd generation with Free T4 reflex; Future  -     PSA, total and free; Future  -     Vitamin D 25 hydroxy; Future  -     Vitamin B12; Future  -     Copper Level; Future  -     Ferritin; Future  -     Folate; Future  -     Vitamin A; Future  -     Vitamin B1, whole blood; Future    Morbid obesity with BMI of 40 0-44 9, adult [facility])  -     Ambulatory referral for colonoscopy;  Future  -     Naltrexone-buPROPion HCl ER 8-90 MG TB12; 1 tab daily x 7 days, 1 tab twice daily x 7 days, 2 tabs in AM and 1 tab in PM x 7 days, then 2 tabs twice daily  -     CBC and differential; Future  -     Comprehensive metabolic panel; Future  -     HEMOGLOBIN A1C W/ EAG ESTIMATION; Future  -     Iron; Future  -     Iron Saturation %; Future  -     Lipid panel; Future  -     TSH, 3rd generation with Free T4 reflex; Future  -     PSA, total and free; Future  -     Vitamin D 25 hydroxy; Future  -     Vitamin B12; Future  -     Copper Level; Future  -     Ferritin; Future  -     Folate; Future  -     Vitamin A; Future  -     Vitamin B1, whole blood; Future    Obesity, Class III, BMI 40-49 9 (morbid obesity) ([facility] [address] )  -     Ambulatory referral for colonoscopy; Future  -     CBC and differential; Future  -     Comprehensive metabolic panel; Future  -     HEMOGLOBIN A1C W/ EAG ESTIMATION; Future  -     Iron; Future  -     Iron Saturation %; Future  -     Lipid panel; Future  -     TSH, 3rd generation with Free T4 reflex; Future  -     PSA, total and free; Future  -     Vitamin D 25 hydroxy; Future  -     Vitamin B12; Future  -     Copper Level; Future  -     Ferritin; Future  -     Folate; Future  -     Vitamin A; Future  -     Vitamin B1, whole blood; Future    Screening for malignant neoplasm of prostate  -     Ambulatory referral for colonoscopy; Future  -     CBC and differential; Future  -     Comprehensive metabolic panel; Future  -     HEMOGLOBIN A1C W/ EAG ESTIMATION; Future  -     Iron; Future  -     Iron Saturation %; Future  -     Lipid panel; Future  -     TSH, 3rd generation with Free T4 reflex; Future  -     PSA, total and free; Future  -     Vitamin D 25 hydroxy; Future  -     Vitamin B12; Future  -     Copper Level; Future  -     Ferritin; Future  -     Folate; Future  -     Vitamin A; Future  -     Vitamin B1, whole blood; Future    Vitamin B12 deficiency  -     Ambulatory referral for colonoscopy; Future  -     CBC and differential; Future  -     Comprehensive metabolic panel;  Future  -     HEMOGLOBIN A1C W/ EAG ESTIMATION; Future  -     Iron; Future  -     Iron Saturation %; Future  -     Lipid panel; Future  -     TSH, 3rd generation with Free T4 reflex; Future  -     PSA, total and free; Future  -     Vitamin D 25 hydroxy; Future  -     Vitamin B12; Future  -     Copper Level; Future  -     Ferritin; Future  -     Folate; Future  -     Vitamin A; Future  -     Vitamin B1, whole blood; Future    Vitamin D deficiency  -     Ambulatory referral for colonoscopy; Future  -     CBC and differential; Future  -     Comprehensive metabolic panel; Future  -     HEMOGLOBIN A1C W/ EAG ESTIMATION; Future  -     Iron; Future  -     Iron Saturation %; Future  -     Lipid panel; Future  -     TSH, 3rd generation with Free T4 reflex; Future  -     PSA, total and free; Future  -     Vitamin D 25 hydroxy; Future  -     Vitamin B12; Future  -     Copper Level; Future  -     Ferritin; Future  -     Folate; Future  -     Vitamin A; Future  -     Vitamin B1, whole blood; Future    Prediabetes    The patient was counseled regarding instructions for management, risk factor reductions, patient and family education,impressions, risks and benefits of treatment options, side effects of medications, importance of compliance with treatment  The treatment plan was reviewed with the patient/guardian and patient/guardian understands and agrees with the treatment plan          Current Outpatient Medications:     CALCIUM CITRATE PO, Take by mouth, Disp: , Rfl:     Cholecalciferol (VITAMIN D3) 2000 units capsule, Take 1 tablet by mouth daily, Disp: , Rfl:     clobetasol (TEMOVATE) 0 05 % ointment, Apply topically 2 (two) times a day, Disp: 60 g, Rfl: 1    Cyanocobalamin (VITAMIN B-12) 5000 MCG SUBL, Place under the tongue, Disp: , Rfl:     docusate sodium (COLACE) 100 mg capsule, Take by mouth daily, Disp: , Rfl:     ergocalciferol (ERGOCALCIFEROL) 1 25 MG (22494 UT) capsule, Vitamin D2 1,250 mcg (50,000 unit) capsule, Disp: , Rfl:     ergocalciferol (VITAMIN D2) 50,000 units, 1 2 times a week, Disp: 24 capsule, Rfl: 2    Multiple Vitamin (MULTI-VITAMIN DAILY) TABS, Take by mouth, Disp: , Rfl:     Naltrexone-buPROPion HCl ER 8-90 MG TB12, 1 tab daily x 7 days, 1 tab twice daily x 7 days, 2 tabs in AM and 1 tab in PM x 7 days, then 2 tabs twice daily, Disp: 120 tablet, Rfl: 0    Omega-3 1000 MG CAPS, Take by mouth, Disp: , Rfl:     Current Facility-Administered Medications:     cyanocobalamin injection 1,000 mcg, 1,000 mcg, Intramuscular, Q30 Days, Omid SheldonNEAL, 1,000 mcg at 04/25/19 1106    Subjective:      Patient ID: Terry Barnes is a 60 y o  male  Here for follow up  Would like to work on losing weight, will be starting Contrave  No complaints today  The following portions of the patient's history were reviewed and updated as appropriate:   He has a past medical history of Arthritis, History of bariatric surgery, Morbid obesity due to excess calories ([facility] [address] ), Postsurgical malabsorption, Type 2 diabetes mellitus ([facility] [address] ), Vitamin B1 deficiency, and Vitamin D deficiency  ,  does not have any pertinent problems on file  ,   has a past surgical history that includes Gastric bypass and Hernia repair  ,  family history includes Bone cancer in his father; Heart disease in his mother; Heart failure in his mother; Hypertension in his mother; Throat cancer in his father  ,   reports that he quit smoking about 10 years ago  He has never used smokeless tobacco  He reports that he drinks alcohol  He reports that he does not use drugs  ,  is allergic to prunus persica       Review of Systems   Constitutional: Negative  Respiratory: Negative  Cardiovascular: Negative  Musculoskeletal: Negative  Psychiatric/Behavioral: Negative          Objective:  /60 (BP Location: Left arm, Patient Position: Sitting)   Pulse 55   Resp 16   Ht 5' 10" (1 778 m)   Wt (!) 142 kg (313 lb)   SpO2 97%   BMI 44 91 kg/m²     Lab Review  Appointment on 07/11/2020 Component Date Value    Hepatitis C Ab 07/11/2020 Non-reactive     Copper 07/11/2020 83     HIV-1/HIV-2 Ab 07/11/2020 Non-Reactive    Appointment on 06/19/2020   Component Date Value    WBC 06/19/2020 6 42     RBC 06/19/2020 4 29     Hemoglobin 06/19/2020 12 9     Hematocrit 06/19/2020 40 7     MCV 06/19/2020 95     MCH 06/19/2020 30 1     MCHC 06/19/2020 31 7     RDW 06/19/2020 13 4     MPV 06/19/2020 9 9     Platelets 89/55/5577 211     nRBC 06/19/2020 0     Neutrophils Relative 06/19/2020 49     Immat GRANS % 06/19/2020 0     Lymphocytes Relative 06/19/2020 38     Monocytes Relative 06/19/2020 9     Eosinophils Relative 06/19/2020 3     Basophils Relative 06/19/2020 1     Neutrophils Absolute 06/19/2020 3 13     Immature Grans Absolute 06/19/2020 0 02     Lymphocytes Absolute 06/19/2020 2 46     Monocytes Absolute 06/19/2020 0 60     Eosinophils Absolute 06/19/2020 0 18     Basophils Absolute 06/19/2020 0 03     Sodium 06/19/2020 144     Potassium 06/19/2020 4 1     Chloride 06/19/2020 110*    CO2 06/19/2020 27     ANION GAP 06/19/2020 7     BUN 06/19/2020 11     Creatinine 06/19/2020 0 82     Glucose, Fasting 06/19/2020 81     Calcium 06/19/2020 8 2*    AST 06/19/2020 17     ALT 06/19/2020 30     Alkaline Phosphatase 06/19/2020 67     Total Protein 06/19/2020 7 1     Albumin 06/19/2020 3 5     Total Bilirubin 06/19/2020 0 30     eGFR 06/19/2020 113     Hemoglobin A1C 06/19/2020 6 0*    EAG 06/19/2020 126     LDL Direct 06/19/2020 73     Cholesterol 06/19/2020 129     Triglycerides 06/19/2020 50     HDL, Direct 06/19/2020 54     LDL Calculated 06/19/2020 65     Magnesium 06/19/2020 1 9     Vit D, 25-Hydroxy 06/19/2020 34 7     Color, UA 06/19/2020 Yellow     Clarity, UA 06/19/2020 Clear     Specific Gravity, UA 06/19/2020 1 025     pH, UA 06/19/2020 6 0     Leukocytes, UA 06/19/2020 Negative     Nitrite, UA 06/19/2020 Negative     Protein, UA 06/19/2020 Negative     Glucose, UA 06/19/2020 Negative     Ketones, UA 06/19/2020 Negative     Urobilinogen, UA 06/19/2020 0 2     Bilirubin, UA 06/19/2020 Negative     Blood, UA 06/19/2020 Negative     Folate 06/19/2020 6 9     Copper 06/19/2020 171*    Vitamin A 06/19/2020 22 3     Vitamin B1, Whole Blood 06/19/2020 76 9     Vitamin B-12 06/19/2020 432     Iron Saturation 06/19/2020 17     TIBC 06/19/2020 425     Iron 06/19/2020 73     Ferritin 06/19/2020 10         Imaging: No results found  Physical Exam   Constitutional: He is oriented to person, place, and time  He appears well-developed and well-nourished  Cardiovascular: Normal rate, regular rhythm, normal heart sounds and intact distal pulses  Pulmonary/Chest: Effort normal and breath sounds normal    Musculoskeletal: Normal range of motion  Neurological: He is alert and oriented to person, place, and time  He has normal reflexes  Psychiatric: He has a normal mood and affect   His behavior is normal  Judgment and thought content normal

## 2020-07-29 ENCOUNTER — TELEPHONE (OUTPATIENT)
Dept: INTERNAL MEDICINE CLINIC | Facility: CLINIC | Age: 55
End: 2020-07-29

## 2020-07-29 NOTE — TELEPHONE ENCOUNTER
[facility] is [facility] prior [facility]      Ref# pa [id number]     Phone -  [phone] -  [employer]

## 2020-08-11 ENCOUNTER — OFFICE VISIT (OUTPATIENT)
Dept: GASTROENTEROLOGY | Facility: CLINIC | Age: 55
End: 2020-08-11
Payer: COMMERCIAL

## 2020-08-11 VITALS
BODY MASS INDEX: 44.67 KG/M2 | WEIGHT: 312 LBS | DIASTOLIC BLOOD PRESSURE: 70 MMHG | SYSTOLIC BLOOD PRESSURE: 130 MMHG | TEMPERATURE: 98.1 F | HEIGHT: 70 IN | HEART RATE: 66 BPM

## 2020-08-11 DIAGNOSIS — E55.9 VITAMIN D DEFICIENCY: ICD-10-CM

## 2020-08-11 DIAGNOSIS — E66.01 MORBID OBESITY WITH BMI OF 40.0-44.9, ADULT (HCC): ICD-10-CM

## 2020-08-11 DIAGNOSIS — Z12.5 SCREENING FOR MALIGNANT NEOPLASM OF PROSTATE: ICD-10-CM

## 2020-08-11 DIAGNOSIS — Z80.0 FAMILY HISTORY OF COLON CANCER IN FATHER: Primary | ICD-10-CM

## 2020-08-11 DIAGNOSIS — Z12.11 SCREENING FOR MALIGNANT NEOPLASM OF COLON: ICD-10-CM

## 2020-08-11 DIAGNOSIS — E66.01 OBESITY, CLASS III, BMI 40-49.9 (MORBID OBESITY) (HCC): ICD-10-CM

## 2020-08-11 DIAGNOSIS — E53.8 VITAMIN B12 DEFICIENCY: ICD-10-CM

## 2020-08-11 PROCEDURE — 3044F HG A1C LEVEL LT 7.0%: CPT | Performed by: INTERNAL MEDICINE

## 2020-08-11 PROCEDURE — 3008F BODY MASS INDEX DOCD: CPT | Performed by: INTERNAL MEDICINE

## 2020-08-11 PROCEDURE — 3075F SYST BP GE 130 - 139MM HG: CPT | Performed by: INTERNAL MEDICINE

## 2020-08-11 PROCEDURE — 99203 OFFICE O/P NEW LOW 30 MIN: CPT | Performed by: INTERNAL MEDICINE

## 2020-08-11 PROCEDURE — 1036F TOBACCO NON-USER: CPT | Performed by: INTERNAL MEDICINE

## 2020-08-11 PROCEDURE — 3078F DIAST BP <80 MM HG: CPT | Performed by: INTERNAL MEDICINE

## 2020-08-11 NOTE — H&P (VIEW-ONLY)
[facility]      Chief Complaint:  Family history of colon cancer    HPI:  Nilesh Macias is a 60 y o   male who presents with family history of colon cancer in the father diagnosed at age 79  Patient had a colonoscopy 5 years ago which just showed hemorrhoids  He denies any abdominal pain, change in bowel habits, change in stool caliber, melena, hematochezia, rectal bleeding, tenesmus, or weight loss  He has had some weight gain since last year  Patient has no chest pain, shortness of breath, dizziness, or any other symptomatology this time         Review of Systems:   Constitutional: No fever or chills, feels well, no tiredness, no recent weight gain or weight loss  HENT: No complaints of earache, no hearing loss, no nosebleeds, no nasal discharge, no sore throat, no hoarseness  Eyes: No complaints of eye pain, no red eyes, no discharge from eyes, no itchy eyes  Cardiovascular: No complaints of slow heart rate, no fast heart rate, no chest pain, no palpitations, no leg claudication, no lower extremity edema  Respiratory: No complaints of shortness of breath, no wheezing, no cough, no SOB on exertion, no orthopnea  Gastrointestinal: As noted in HPI  Genitourinary: No complaints of dysuria, no incontinence, no hesitancy, no nocturia  Musculoskeletal: No complaints of arthralgia, no myalgias, no joint swelling or stiffness, no limb pain or swelling  Neurological: No complaints of headache, no confusion, no convulsions, no numbness or tingling, no dizziness or fainting, no limb weakness, no difficulty walking  Skin: No complaints of skin rash or skin lesions, no itching, no skin wound, no dry skin  Hematological/Lymphatic: No complaints of swollen glands, does not bleed easy  Allergic/Immunologic: No immunocompromised state  Endocrine:  No complaints of polyuria, no polydipsia     Psychiatric/Behavioral: is not suicidal, no sleep disturbances, no anxiety or depression, no change in personality, no emotional problems  Historical Information   Past Medical History:   Diagnosis Date    Arthritis     Last assessed: 5/21/12    History of bariatric surgery     Morbid obesity due to excess calories ([facility] )     Last assessed: 5/21/12    Postsurgical malabsorption     Type 2 diabetes mellitus ([facility] )     Last assessed: 6/5/15    Vitamin B1 deficiency     Last assessed: 9/29/14    Vitamin D deficiency     Last assessed: 9/29/14     Past Surgical History:   Procedure Laterality Date    GASTRIC BYPASS      For Morbid Obesity  Managed by: Lanita Boas  Last assessed: 6/16/14    HERNIA REPAIR       Social History   Social History     Substance and Sexual Activity   Alcohol Use Yes    Comment: Social     Social History     Substance and Sexual Activity   Drug Use No     Social History     Tobacco Use   Smoking Status Former Smoker    Last attempt to quit: 4/11/2010    Years since quitting: 10 3   Smokeless Tobacco Never Used   Tobacco Comment    occ     Family History   Problem Relation Age of Onset    Heart failure Mother         Congestive    Hypertension Mother     Heart disease Mother     Bone cancer Father     Throat cancer Father         Laryngeal    Diabetes Neg Hx     Thyroid disease Neg Hx     Stroke Neg Hx          Current Medications: has a current medication list which includes the following prescription(s): calcium citrate, vitamin d3, clobetasol, vitamin b-12, docusate sodium, ergocalciferol, ergocalciferol, multi-vitamin daily, naltrexone-bupropion hcl er, and omega-3, and the following Facility-Administered Medications: cyanocobalamin          Vital Signs: /70   Pulse 66   Temp 98 1 °F (36 7 °C)   Ht 5' 10" (1 778 m)   Wt (!) 142 kg (312 lb)   BMI 44 77 kg/m²       Physical Exam:   Constitutional  General Appearance: No acute distress, well appearing and well nourished, central obesity  Head  Normocephalic  Eyes  Conjunctivae and lids: No swelling, erythema, or discharge  Pupils and irises: Equal, round and reactive to light  Ears, Nose, Mouth, and Throat  External inspection of ears and nose: Normal  Nasal mucosa, septum and turbinates: Normal without edema or erythema/   Oropharynx: Normal with no erythema, edema, exudate or lesions  Neck  Normal range of motion  Neck supple  Cardiovascular  Auscultation of the heart: Normal rate and rhythm, normal S1 and S2 without murmurs  Examination of the extremities for edema and/or varicosities: Normal  Pulmonary/Chest  Respiratory effort: No increased work of breathing or signs of respiratory distress  Auscultation of lungs: Clear to auscultation, equal breath sounds bilaterally, no wheezes, rales, no rhonchi  Abdomen  Abdomen: Non-tender, no masses  Liver and spleen: No hepatomegaly or splenomegaly  Musculoskeletal  Gait and station: normal   Digits and Nails: normal without clubbing or cyanosis  Inspection/palpation of joints, bones, and muscles: Normal  Neurological  No nystagmus or asterixis  Skin  Skin and subcutaneous tissue: Normal without rashes or lesions  Lymphatic  Palpation of the lymph nodes in neck: No lymphadenopathy     Psychiatric  Orientation to person, place and time: Normal   Mood and affect: Normal          Labs:  Lab Results   Component Value Date    ALT 30 06/19/2020    AST 17 06/19/2020    BUN 11 06/19/2020    CALCIUM 8 2 (L) 06/19/2020     (H) 06/19/2020    CHOL 155 09/26/2017    CO2 27 06/19/2020    CREATININE 0 82 06/19/2020    HDL 54 06/19/2020    HCT 40 7 06/19/2020    HGB 12 9 06/19/2020    HGBA1C 6 0 (H) 06/19/2020    MG 1 9 06/19/2020     06/19/2020    K 4 1 06/19/2020    PSA 0 3 04/04/2018     09/26/2017    TRIG 50 06/19/2020    WBC 6 42 06/19/2020         X-Rays & Procedures:   No orders to display           ______________________________________________________________________      Assessment & Plan:     Diagnoses and all orders for this visit:    Family history of colon cancer in father      Patient will be scheduled for repeat colonoscopy  Further recommendations will be based on the study results

## 2020-08-11 NOTE — PROGRESS NOTES
[facility]      Chief Complaint:  Family history of colon cancer    HPI:  Renda Riedel Burden is a 60 y o   male who presents with family history of colon cancer in the father diagnosed at age 79  Patient had a colonoscopy 5 years ago which just showed hemorrhoids  He denies any abdominal pain, change in bowel habits, change in stool caliber, melena, hematochezia, rectal bleeding, tenesmus, or weight loss  He has had some weight gain since last year  Patient has no chest pain, shortness of breath, dizziness, or any other symptomatology this time         Review of Systems:   Constitutional: No fever or chills, feels well, no tiredness, no recent weight gain or weight loss  HENT: No complaints of earache, no hearing loss, no nosebleeds, no nasal discharge, no sore throat, no hoarseness  Eyes: No complaints of eye pain, no red eyes, no discharge from eyes, no itchy eyes  Cardiovascular: No complaints of slow heart rate, no fast heart rate, no chest pain, no palpitations, no leg claudication, no lower extremity edema  Respiratory: No complaints of shortness of breath, no wheezing, no cough, no SOB on exertion, no orthopnea  Gastrointestinal: As noted in HPI  Genitourinary: No complaints of dysuria, no incontinence, no hesitancy, no nocturia  Musculoskeletal: No complaints of arthralgia, no myalgias, no joint swelling or stiffness, no limb pain or swelling  Neurological: No complaints of headache, no confusion, no convulsions, no numbness or tingling, no dizziness or fainting, no limb weakness, no difficulty walking  Skin: No complaints of skin rash or skin lesions, no itching, no skin wound, no dry skin  Hematological/Lymphatic: No complaints of swollen glands, does not bleed easy  Allergic/Immunologic: No immunocompromised state  Endocrine:  No complaints of polyuria, no polydipsia     Psychiatric/Behavioral: is not suicidal, no sleep disturbances, no anxiety or depression, no change in personality, no emotional problems  Historical Information   Past Medical History:   Diagnosis Date    Arthritis     Last assessed: 5/21/12    History of bariatric surgery     Morbid obesity due to excess calories ([facility] )     Last assessed: 5/21/12    Postsurgical malabsorption     Type 2 diabetes mellitus ([facility] )     Last assessed: 6/5/15    Vitamin B1 deficiency     Last assessed: 9/29/14    Vitamin D deficiency     Last assessed: 9/29/14     Past Surgical History:   Procedure Laterality Date    GASTRIC BYPASS      For Morbid Obesity  Managed by: Kelly Rascon  Last assessed: 6/16/14    HERNIA REPAIR       Social History   Social History     Substance and Sexual Activity   Alcohol Use Yes    Comment: Social     Social History     Substance and Sexual Activity   Drug Use No     Social History     Tobacco Use   Smoking Status Former Smoker    Last attempt to quit: 4/11/2010    Years since quitting: 10 3   Smokeless Tobacco Never Used   Tobacco Comment    occ     Family History   Problem Relation Age of Onset    Heart failure Mother         Congestive    Hypertension Mother     Heart disease Mother     Bone cancer Father     Throat cancer Father         Laryngeal    Diabetes Neg Hx     Thyroid disease Neg Hx     Stroke Neg Hx          Current Medications: has a current medication list which includes the following prescription(s): calcium citrate, vitamin d3, clobetasol, vitamin b-12, docusate sodium, ergocalciferol, ergocalciferol, multi-vitamin daily, naltrexone-bupropion hcl er, and omega-3, and the following Facility-Administered Medications: cyanocobalamin          Vital Signs: /70   Pulse 66   Temp 98 1 °F (36 7 °C)   Ht 5' 10" (1 778 m)   Wt (!) 142 kg (312 lb)   BMI 44 77 kg/m²       Physical Exam:   Constitutional  General Appearance: No acute distress, well appearing and well nourished, central obesity  Head  Normocephalic  Eyes  Conjunctivae and lids: No swelling, erythema, or discharge  Pupils and irises: Equal, round and reactive to light  Ears, Nose, Mouth, and Throat  External inspection of ears and nose: Normal  Nasal mucosa, septum and turbinates: Normal without edema or erythema/   Oropharynx: Normal with no erythema, edema, exudate or lesions  Neck  Normal range of motion  Neck supple  Cardiovascular  Auscultation of the heart: Normal rate and rhythm, normal S1 and S2 without murmurs  Examination of the extremities for edema and/or varicosities: Normal  Pulmonary/Chest  Respiratory effort: No increased work of breathing or signs of respiratory distress  Auscultation of lungs: Clear to auscultation, equal breath sounds bilaterally, no wheezes, rales, no rhonchi  Abdomen  Abdomen: Non-tender, no masses  Liver and spleen: No hepatomegaly or splenomegaly  Musculoskeletal  Gait and station: normal   Digits and Nails: normal without clubbing or cyanosis  Inspection/palpation of joints, bones, and muscles: Normal  Neurological  No nystagmus or asterixis  Skin  Skin and subcutaneous tissue: Normal without rashes or lesions  Lymphatic  Palpation of the lymph nodes in neck: No lymphadenopathy     Psychiatric  Orientation to person, place and time: Normal   Mood and affect: Normal          Labs:  Lab Results   Component Value Date    ALT 30 06/19/2020    AST 17 06/19/2020    BUN 11 06/19/2020    CALCIUM 8 2 (L) 06/19/2020     (H) 06/19/2020    CHOL 155 09/26/2017    CO2 27 06/19/2020    CREATININE 0 82 06/19/2020    HDL 54 06/19/2020    HCT 40 7 06/19/2020    HGB 12 9 06/19/2020    HGBA1C 6 0 (H) 06/19/2020    MG 1 9 06/19/2020     06/19/2020    K 4 1 06/19/2020    PSA 0 3 04/04/2018     09/26/2017    TRIG 50 06/19/2020    WBC 6 42 06/19/2020         X-Rays & Procedures:   No orders to display           ______________________________________________________________________      Assessment & Plan:     Diagnoses and all orders for this visit:    Family history of colon cancer in father      Patient will be scheduled for repeat colonoscopy  Further recommendations will be based on the study results

## 2020-08-22 ENCOUNTER — ANESTHESIA EVENT (OUTPATIENT)
Dept: GASTROENTEROLOGY | Facility: HOSPITAL | Age: 55
End: 2020-08-22

## 2020-08-22 ENCOUNTER — HOSPITAL ENCOUNTER (OUTPATIENT)
Dept: GASTROENTEROLOGY | Facility: HOSPITAL | Age: 55
Setting detail: OUTPATIENT SURGERY
Discharge: HOME | End: 2020-08-22
Attending: INTERNAL MEDICINE
Payer: COMMERCIAL

## 2020-08-22 ENCOUNTER — ANESTHESIA (OUTPATIENT)
Dept: GASTROENTEROLOGY | Facility: HOSPITAL | Age: 55
End: 2020-08-22

## 2020-08-22 VITALS
TEMPERATURE: 98.2 F | SYSTOLIC BLOOD PRESSURE: 125 MMHG | WEIGHT: 314.38 LBS | RESPIRATION RATE: 18 BRPM | HEIGHT: 72 IN | OXYGEN SATURATION: 98 % | HEART RATE: 58 BPM | DIASTOLIC BLOOD PRESSURE: 69 MMHG | BODY MASS INDEX: 42.58 KG/M2

## 2020-08-22 DIAGNOSIS — Z80.0 FAMILY HISTORY OF COLON CANCER IN FATHER: ICD-10-CM

## 2020-08-22 PROCEDURE — 45385 COLONOSCOPY W/LESION REMOVAL: CPT | Performed by: INTERNAL MEDICINE

## 2020-08-22 PROCEDURE — 88305 TISSUE EXAM BY PATHOLOGIST: CPT | Performed by: PATHOLOGY

## 2020-08-22 RX ORDER — LIDOCAINE HYDROCHLORIDE 10 MG/ML
INJECTION, SOLUTION EPIDURAL; INFILTRATION; INTRACAUDAL; PERINEURAL AS NEEDED
Status: DISCONTINUED | OUTPATIENT
Start: 2020-08-22 | End: 2020-08-22

## 2020-08-22 RX ORDER — PROPOFOL 10 MG/ML
INJECTION, EMULSION INTRAVENOUS AS NEEDED
Status: DISCONTINUED | OUTPATIENT
Start: 2020-08-22 | End: 2020-08-22

## 2020-08-22 RX ORDER — GLYCOPYRROLATE 0.2 MG/ML
INJECTION INTRAMUSCULAR; INTRAVENOUS AS NEEDED
Status: DISCONTINUED | OUTPATIENT
Start: 2020-08-22 | End: 2020-08-22

## 2020-08-22 RX ORDER — SODIUM CHLORIDE, SODIUM LACTATE, POTASSIUM CHLORIDE, CALCIUM CHLORIDE 600; 310; 30; 20 MG/100ML; MG/100ML; MG/100ML; MG/100ML
75 INJECTION, SOLUTION INTRAVENOUS CONTINUOUS
Status: DISCONTINUED | OUTPATIENT
Start: 2020-08-22 | End: 2020-08-22

## 2020-08-22 RX ORDER — SODIUM CHLORIDE, SODIUM LACTATE, POTASSIUM CHLORIDE, CALCIUM CHLORIDE 600; 310; 30; 20 MG/100ML; MG/100ML; MG/100ML; MG/100ML
125 INJECTION, SOLUTION INTRAVENOUS CONTINUOUS
Status: DISCONTINUED | OUTPATIENT
Start: 2020-08-22 | End: 2020-08-22

## 2020-08-22 RX ADMIN — LIDOCAINE HYDROCHLORIDE 50 MG: 10 INJECTION, SOLUTION EPIDURAL; INFILTRATION; INTRACAUDAL; PERINEURAL at 07:39

## 2020-08-22 RX ADMIN — GLYCOPYRROLATE 0.1 MG: 0.2 INJECTION, SOLUTION INTRAMUSCULAR; INTRAVENOUS at 07:37

## 2020-08-22 RX ADMIN — PROPOFOL 100 MG: 10 INJECTION, EMULSION INTRAVENOUS at 07:39

## 2020-08-22 RX ADMIN — PROPOFOL 30 MG: 10 INJECTION, EMULSION INTRAVENOUS at 07:42

## 2020-08-22 RX ADMIN — PROPOFOL 30 MG: 10 INJECTION, EMULSION INTRAVENOUS at 07:46

## 2020-08-22 RX ADMIN — PROPOFOL 20 MG: 10 INJECTION, EMULSION INTRAVENOUS at 07:50

## 2020-08-22 RX ADMIN — PROPOFOL 30 MG: 10 INJECTION, EMULSION INTRAVENOUS at 07:44

## 2020-08-22 RX ADMIN — SODIUM CHLORIDE, SODIUM LACTATE, POTASSIUM CHLORIDE, AND CALCIUM CHLORIDE 75 ML/HR: .6; .31; .03; .02 INJECTION, SOLUTION INTRAVENOUS at 06:53

## 2020-08-22 RX ADMIN — PROPOFOL 30 MG: 10 INJECTION, EMULSION INTRAVENOUS at 07:48

## 2020-08-22 NOTE — ANESTHESIA PREPROCEDURE EVALUATION
Procedure:  COLONOSCOPY    Relevant Problems   MUSCULOSKELETAL   (+) Osteoarthritis of first metatarsophalangeal joint        Physical Exam    Airway    Mallampati score: II  TM Distance: >3 FB  Neck ROM: full     Dental   No notable dental hx     Cardiovascular  Rhythm: regular, Rate: normal, Cardiovascular exam normal    Pulmonary  Pulmonary exam normal Breath sounds clear to auscultation,     Other Findings        Anesthesia Plan  ASA Score- 2     Anesthesia Type- IV sedation with anesthesia with ASA Monitors  Additional Monitors:   Airway Plan:           Plan Factors-Exercise tolerance (METS): >4 METS  Chart reviewed  EKG reviewed  Existing labs reviewed  Patient summary reviewed  Patient is not a current smoker  Induction- intravenous  Postoperative Plan- Plan for postoperative opioid use  Informed Consent- Anesthetic plan and risks discussed with patient  I personally reviewed this patient with the CRNA  Discussed and agreed on the Anesthesia Plan with the CRNA  Mariluz Fletcher

## 2020-08-22 NOTE — INTERVAL H&P NOTE
H&P reviewed  After examining the patient I find no changes in the patients condition since the H&P had been written      Vitals:    08/22/20 0649   BP: 148/80   Pulse: 55   Resp: 16   Temp: 98 5 °F (36 9 °C)   SpO2: 99%

## 2020-08-22 NOTE — ANESTHESIA POSTPROCEDURE EVALUATION
Post-Op Assessment Note    CV Status:  Stable  Pain Score: 0    Pain management: adequate     Mental Status:  Awake   Hydration Status:  Stable   PONV Controlled:  None   Airway Patency:  Patent and adequate      Post Op Vitals Reviewed: Yes      Staff: CRNA, Anesthesiologist   Comments: 6LPM/Mask        No complications documented      BP   111/59   Temp   98 2   Pulse  51   Resp   18   SpO2   97

## 2020-08-22 NOTE — DISCHARGE INSTRUCTIONS
Colonoscopy   WHAT YOU NEED TO KNOW:   A colonoscopy is a procedure to examine the inside of your colon (intestine) with a scope  Polyps or tissue growths may have been removed during your colonoscopy  It is normal to feel bloated and to have some abdominal discomfort  You should be passing gas  If you have hemorrhoids or you had polyps removed, you may have a small amount of bleeding  DISCHARGE INSTRUCTIONS:   Seek care immediately if:   · You have a large amount of bright red blood in your bowel movements  · Your abdomen is hard and firm and you have severe pain  · You have sudden trouble breathing  Contact your healthcare provider if:   · You develop a rash or hives  · You have a fever within 24 hours of your procedure  · You have not had a bowel movement for 3 days after your procedure  · You have questions or concerns about your condition or care  Activity:   · Do not lift, strain, or run  for 3 days after your procedure  · Rest after your procedure  You have been given medicine to relax you  Do not  drive or make important decisions until the day after your procedure  Return to your normal activity as directed  · Relieve gas and discomfort from bloating  by lying on your right side with a heating pad on your abdomen  You may need to take short walks to help the gas move out  Eat small meals until bloating is relieved  If you had polyps removed: For 7 days after your procedure:  · Do not  take aspirin  · Do not  go on long car rides  Help prevent constipation:   · Eat a variety of healthy foods  Healthy foods include fruit, vegetables, whole-grain breads, low-fat dairy products, beans, lean meat, and fish  Ask if you need to be on a special diet  Your healthcare provider may recommend that you eat high-fiber foods such as cooked beans  Fiber helps you have regular bowel movements  · Drink liquids as directed    Adults should drink between 9 and 13 eight-ounce cups of liquid every day  Ask what amount is best for you  For most people, good liquids to drink are water, juice, and milk  · Exercise as directed  Talk to your healthcare provider about the best exercise plan for you  Exercise can help prevent constipation, decrease your blood pressure and improve your health  Follow up with your healthcare provider as directed:  Write down your questions so you remember to ask them during your visits  © 2017 [employer] Information is for End User's use only and may not be sold, redistributed or otherwise used for commercial purposes  All illustrations and images included in CareNotes® are the copyrighted property of [employer]  or [employer]  The above information is an  only  It is not intended as medical advice for individual conditions or treatments  Talk to your doctor, nurse or pharmacist before following any medical regimen to see if it is safe and effective for you

## 2021-02-18 ENCOUNTER — OFFICE VISIT (OUTPATIENT)
Dept: INTERNAL MEDICINE CLINIC | Facility: CLINIC | Age: 56
End: 2021-02-18
Payer: COMMERCIAL

## 2021-02-18 VITALS
OXYGEN SATURATION: 98 % | SYSTOLIC BLOOD PRESSURE: 136 MMHG | HEART RATE: 86 BPM | TEMPERATURE: 98.7 F | BODY MASS INDEX: 42.66 KG/M2 | WEIGHT: 315 LBS | HEIGHT: 72 IN | DIASTOLIC BLOOD PRESSURE: 80 MMHG

## 2021-02-18 DIAGNOSIS — R73.03 PREDIABETES: ICD-10-CM

## 2021-02-18 DIAGNOSIS — R63.5 WEIGHT GAIN FOLLOWING GASTRIC BYPASS SURGERY: ICD-10-CM

## 2021-02-18 DIAGNOSIS — E66.01 MORBID OBESITY WITH BMI OF 40.0-44.9, ADULT (HCC): Primary | ICD-10-CM

## 2021-02-18 DIAGNOSIS — Z98.84 WEIGHT GAIN FOLLOWING GASTRIC BYPASS SURGERY: ICD-10-CM

## 2021-02-18 DIAGNOSIS — L43.9 LICHEN PLANUS: ICD-10-CM

## 2021-02-18 LAB — SL AMB POCT HEMOGLOBIN AIC: 5.5 (ref ?–6.5)

## 2021-02-18 PROCEDURE — 3008F BODY MASS INDEX DOCD: CPT | Performed by: INTERNAL MEDICINE

## 2021-02-18 PROCEDURE — 99214 OFFICE O/P EST MOD 30 MIN: CPT | Performed by: INTERNAL MEDICINE

## 2021-02-18 PROCEDURE — 83036 HEMOGLOBIN GLYCOSYLATED A1C: CPT | Performed by: INTERNAL MEDICINE

## 2021-02-18 PROCEDURE — 3725F SCREEN DEPRESSION PERFORMED: CPT | Performed by: INTERNAL MEDICINE

## 2021-02-18 RX ORDER — CLOBETASOL PROPIONATE 0.5 MG/G
OINTMENT TOPICAL 2 TIMES DAILY
Qty: 60 G | Refills: 1 | Status: SHIPPED | OUTPATIENT
Start: 2021-02-18

## 2021-02-18 NOTE — PROGRESS NOTES
Assessment/Plan:    1  Preventative screenings: colonoscopy up to date- 8/22/2020; 2 sessile polyps found and biopsied- no evidence of malignancy  Repeat in 5 years- 2025   2  H/O gastric bypass 9 years ago; weight loss 450--> 258; currently 323; would like to see bariatric surgery- weight loss management  On contrave  3  H/O prediabetes: a1c today 5 5  4  Labs to be done asapBMI Counseling: Body mass index is 43 81 kg/m²  The BMI is above normal  Nutrition recommendations include encouraging healthy choices of fruits and vegetables, decreasing fast food intake and consuming healthier snacks  Patient referred to bariatric surgery due to patient being overweight  No problem-specific Assessment & Plan notes found for this encounter  Diagnoses and all orders for this visit:    Morbid obesity with BMI of 40 0-44 9, adult ([facility] )  -     Cancel: Ambulatory referral to Weight Management; Future  -     Naltrexone-buPROPion HCl ER 8-90 MG TB12; 1 tab daily x 7 days, 1 tab twice daily x 7 days, 2 tabs in AM and 1 tab in PM x 7 days, then 2 tabs twice daily  -     Ambulatory referral to Weight Management; Future    Prediabetes  -     POCT hemoglobin A1c    Weight gain following gastric bypass surgery  -     Cancel: Ambulatory referral to Weight Management; Future  -     Ambulatory referral to Weight Management; Future    Lichen planus  -     clobetasol (TEMOVATE) 0 05 % ointment; Apply topically 2 (two) times a day          Subjective:      Patient ID: Sigrid Cobos is a 60 y o  male  Patient is a very pleasant 60year old male who presents to the clinic today to [facility] care as a new patient  He is a former patient of Dr Shahzad Liu  His PMH consists of gastric bypass 9 years ago  He commutes to [place] where he works for the [employer]  He states he is  and has children  He is doing well and denies any cp, sob, palpitations, n/v/d/c   He does admit to not adhering to a healthy diet these past few years and gaining a lot of the weight he lost since the gastric bypass  He is motivated to make some changes and become healthier  He has been taking contrave  Colonoscopy is up to date  The following portions of the patient's history were reviewed and updated as appropriate:   He  has a past medical history of Arthritis, History of bariatric surgery, Morbid obesity due to excess calories ([address] ), Postsurgical malabsorption, Type 2 diabetes mellitus ([facility] ), Vitamin B1 deficiency, and Vitamin D deficiency  He   Patient Active Problem List    Diagnosis Date Noted    Screening for malignant neoplasm of colon 07/17/2020    Screening for malignant neoplasm of prostate 07/17/2020    Educated about COVID-19 virus infection 07/06/2020    Encounter for surgical aftercare following surgery of digestive system 12/10/2019    Prediabetes 11/07/2019    At risk for obstructive sleep apnea 10/17/2019    Obesity, Class III, BMI 40-49 9 (morbid obesity) ([facility] ) 07/12/2019    Vitamin B12 deficiency 04/25/2019    Ventral hernia 10/27/2017    Weight gain following gastric bypass surgery 80/58/1657    Lichen planus 55/62/6876    Seborrheic keratoses, inflamed 01/09/2017    Osteoarthritis of first metatarsophalangeal joint 09/06/2016    Balanitis 08/24/2016    Internal hemorrhoid 08/11/2015    Penile cyst 11/08/2014    Postgastrectomy malabsorption 03/24/2014    Vitamin D deficiency 91/33/1761    Umbilical hernia 55/78/2924     He  has a past surgical history that includes Gastric bypass and Hernia repair  His family history includes Bone cancer in his father; Heart disease in his mother; Heart failure in his mother; Hypertension in his mother; Throat cancer in his father  He  reports that he has been smoking  He has never used smokeless tobacco  He reports current alcohol use  He reports that he does not use drugs    Current Outpatient Medications   Medication Sig Dispense Refill    CALCIUM CITRATE PO Take by mouth      Cholecalciferol (VITAMIN D3) 2000 units capsule Take 1 tablet by mouth daily      clobetasol (TEMOVATE) 0 05 % ointment Apply topically 2 (two) times a day 60 g 1    Cyanocobalamin (VITAMIN B-12) 5000 MCG SUBL Place under the tongue      Multiple Vitamin (MULTI-VITAMIN DAILY) TABS Take by mouth      Naltrexone-buPROPion HCl ER 8-90 MG TB12 1 tab daily x 7 days, 1 tab twice daily x 7 days, 2 tabs in AM and 1 tab in PM x 7 days, then 2 tabs twice daily 120 tablet 0     Current Facility-Administered Medications   Medication Dose Route Frequency Provider Last Rate Last Admin    cyanocobalamin injection 1,000 mcg  1,000 mcg Intramuscular Q30 Days NEAL Lundberg   1,000 mcg at 04/25/19 1106     Current Outpatient Medications on File Prior to Visit   Medication Sig    CALCIUM CITRATE PO Take by mouth    Cholecalciferol (VITAMIN D3) 2000 units capsule Take 1 tablet by mouth daily    Cyanocobalamin (VITAMIN B-12) 5000 MCG SUBL Place under the tongue    Multiple Vitamin (MULTI-VITAMIN DAILY) TABS Take by mouth     Current Facility-Administered Medications on File Prior to Visit   Medication    cyanocobalamin injection 1,000 mcg     He is allergic to prunus persica       Review of Systems   Constitutional: Positive for unexpected weight change  All other systems reviewed and are negative  Objective:      /80 (BP Location: Right arm, Patient Position: Sitting, Cuff Size: Large)   Pulse 86   Temp 98 7 °F (37 1 °C) (Temporal)   Ht 6' (1 829 m)   Wt (!) 147 kg (323 lb)   SpO2 98%   BMI 43 81 kg/m²          Physical Exam  Vitals signs and nursing note reviewed  Constitutional:       Appearance: Normal appearance  He is obese  HENT:      Head: Normocephalic and atraumatic        Right Ear: Tympanic membrane normal       Left Ear: Tympanic membrane normal       Nose: Nose normal       Mouth/Throat:      Mouth: Mucous membranes are moist    Eyes:      Pupils: Pupils are equal, round, and reactive to light  Neck:      Vascular: No carotid bruit  Cardiovascular:      Rate and Rhythm: Normal rate and regular rhythm  Heart sounds: Normal heart sounds  Pulmonary:      Effort: Pulmonary effort is normal       Breath sounds: Normal breath sounds  Abdominal:      General: Bowel sounds are normal       Palpations: Abdomen is soft  Hernia: A hernia is present  Musculoskeletal: Normal range of motion  Lymphadenopathy:      Cervical: No cervical adenopathy  Skin:     General: Skin is warm and dry  Neurological:      General: No focal deficit present  Mental Status: He is alert and oriented to person, place, and time  Mental status is at baseline     Psychiatric:         Mood and Affect: Mood normal          Behavior: Behavior normal

## 2021-02-25 DIAGNOSIS — K91.2 POSTGASTRECTOMY MALABSORPTION: ICD-10-CM

## 2021-02-25 DIAGNOSIS — E66.01 MORBID OBESITY WITH BMI OF 40.0-44.9, ADULT (HCC): ICD-10-CM

## 2021-02-25 DIAGNOSIS — E55.9 VITAMIN D DEFICIENCY: Primary | ICD-10-CM

## 2021-02-25 DIAGNOSIS — Z90.3 POSTGASTRECTOMY MALABSORPTION: ICD-10-CM

## 2021-02-25 LAB
25(OH)D3+25(OH)D2 SERPL-MCNC: 18.6 NG/ML (ref 30–100)
ALBUMIN SERPL-MCNC: 4.1 G/DL (ref 3.8–4.9)
ALBUMIN/GLOB SERPL: 1.5 {RATIO} (ref 1.2–2.2)
ALP SERPL-CCNC: 78 IU/L (ref 39–117)
ALT SERPL-CCNC: 30 IU/L (ref 0–44)
AST SERPL-CCNC: 20 IU/L (ref 0–40)
BASOPHILS # BLD AUTO: 0.1 X10E3/UL (ref 0–0.2)
BASOPHILS NFR BLD AUTO: 1 %
BILIRUB SERPL-MCNC: 0.4 MG/DL (ref 0–1.2)
BUN SERPL-MCNC: 12 MG/DL (ref 6–24)
BUN/CREAT SERPL: 15 (ref 9–20)
CALCIUM SERPL-MCNC: 8.6 MG/DL (ref 8.7–10.2)
CHLORIDE SERPL-SCNC: 107 MMOL/L (ref 96–106)
CHOLEST SERPL-MCNC: 161 MG/DL (ref 100–199)
CO2 SERPL-SCNC: 22 MMOL/L (ref 20–29)
COPPER SERPL-MCNC: 85 UG/DL (ref 69–132)
CREAT SERPL-MCNC: 0.78 MG/DL (ref 0.76–1.27)
EOSINOPHIL # BLD AUTO: 0.2 X10E3/UL (ref 0–0.4)
EOSINOPHIL NFR BLD AUTO: 3 %
ERYTHROCYTE [DISTWIDTH] IN BLOOD BY AUTOMATED COUNT: 12.7 % (ref 11.6–15.4)
EST. AVERAGE GLUCOSE BLD GHB EST-MCNC: 123 MG/DL
FERRITIN SERPL-MCNC: 24 NG/ML (ref 30–400)
FOLATE SERPL-MCNC: 6.6 NG/ML
GLOBULIN SER-MCNC: 2.7 G/DL (ref 1.5–4.5)
GLUCOSE SERPL-MCNC: 111 MG/DL (ref 65–99)
HBA1C MFR BLD: 5.9 % (ref 4.8–5.6)
HCT VFR BLD AUTO: 40.6 % (ref 37.5–51)
HDLC SERPL-MCNC: 59 MG/DL
HGB BLD-MCNC: 13.2 G/DL (ref 13–17.7)
IMM GRANULOCYTES # BLD: 0 X10E3/UL (ref 0–0.1)
IMM GRANULOCYTES NFR BLD: 0 %
IRON SATN MFR SERPL: 26 % SATURATION
IRON SATN MFR SERPL: 31 % (ref 15–55)
IRON SERPL-MCNC: 142 UG/DL
IRON SERPL-MCNC: 144 UG/DL (ref 38–169)
LDLC SERPL CALC-MCNC: 91 MG/DL (ref 0–99)
LYMPHOCYTES # BLD AUTO: 2.4 X10E3/UL (ref 0.7–3.1)
LYMPHOCYTES NFR BLD AUTO: 35 %
MCH RBC QN AUTO: 29.3 PG (ref 26.6–33)
MCHC RBC AUTO-ENTMCNC: 32.5 G/DL (ref 31.5–35.7)
MCV RBC AUTO: 90 FL (ref 79–97)
MONOCYTES # BLD AUTO: 0.5 X10E3/UL (ref 0.1–0.9)
MONOCYTES NFR BLD AUTO: 7 %
NEUTROPHILS # BLD AUTO: 3.8 X10E3/UL (ref 1.4–7)
NEUTROPHILS NFR BLD AUTO: 54 %
PLATELET # BLD AUTO: 242 X10E3/UL (ref 150–450)
POTASSIUM SERPL-SCNC: 4.1 MMOL/L (ref 3.5–5.2)
PROT SERPL-MCNC: 6.8 G/DL (ref 6–8.5)
PSA FREE MFR SERPL: 23.3 %
PSA FREE SERPL-MCNC: 0.07 NG/ML
PSA SERPL-MCNC: 0.3 NG/ML (ref 0–4)
RBC # BLD AUTO: 4.51 X10E6/UL (ref 4.14–5.8)
SL AMB EGFR AFRICAN AMERICAN: 116 ML/MIN/1.73
SL AMB EGFR NON AFRICAN AMERICAN: 100 ML/MIN/1.73
SL AMB VLDL CHOLESTEROL CALC: 11 MG/DL (ref 5–40)
SODIUM SERPL-SCNC: 140 MMOL/L (ref 134–144)
TIBC SERPL-MCNC: 468 UG/DL (ref 250–450)
TRANSFERRIN SERPL-MCNC: 388 MG/DL
TRIGL SERPL-MCNC: 54 MG/DL (ref 0–149)
TSH SERPL DL<=0.005 MIU/L-ACNC: 1.65 UIU/ML (ref 0.45–4.5)
UIBC SERPL-MCNC: 324 UG/DL (ref 111–343)
VIT A SERPL-MCNC: 26.8 UG/DL (ref 20.1–62)
VIT B1 BLD-SCNC: 88 NMOL/L (ref 66.5–200)
VIT B12 SERPL-MCNC: 329 PG/ML (ref 232–1245)
WBC # BLD AUTO: 6.8 X10E3/UL (ref 3.4–10.8)

## 2021-02-25 PROCEDURE — 3044F HG A1C LEVEL LT 7.0%: CPT | Performed by: INTERNAL MEDICINE

## 2021-02-25 RX ORDER — FERROUS SULFATE TAB EC 324 MG (65 MG FE EQUIVALENT) 324 (65 FE) MG
324 TABLET DELAYED RESPONSE ORAL
Qty: 90 TABLET | Refills: 2 | Status: SHIPPED | OUTPATIENT
Start: 2021-02-25

## 2021-02-25 RX ORDER — ERGOCALCIFEROL 1.25 MG/1
50000 CAPSULE ORAL WEEKLY
Qty: 4 CAPSULE | Refills: 2 | Status: SHIPPED | OUTPATIENT
Start: 2021-02-25 | End: 2021-08-27

## 2021-04-07 ENCOUNTER — TELEPHONE (OUTPATIENT)
Dept: INTERNAL MEDICINE CLINIC | Facility: CLINIC | Age: 56
End: 2021-04-07

## 2021-04-07 NOTE — TELEPHONE ENCOUNTER
Patient had second vaccine on 4/1 and then started with headache, fever and chills which ended Sunday 4/4  Now all of the sudden patient's cense of smell went starting on Monday 4/5 but is returning a little bit  Patient can smell alcohol, colognes and some foods  Patient also wanted you to know that he has dropped some weight from 324 lbs to 308 lbs    Please advise    Xavier Rivera

## 2021-04-13 ENCOUNTER — TELEPHONE (OUTPATIENT)
Dept: FAMILY MEDICINE CLINIC | Facility: HOSPITAL | Age: 56
End: 2021-04-13

## 2021-04-13 NOTE — TELEPHONE ENCOUNTER
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

## 2021-08-27 ENCOUNTER — OFFICE VISIT (OUTPATIENT)
Dept: INTERNAL MEDICINE CLINIC | Facility: CLINIC | Age: 56
End: 2021-08-27
Payer: COMMERCIAL

## 2021-08-27 VITALS
WEIGHT: 313.6 LBS | DIASTOLIC BLOOD PRESSURE: 78 MMHG | HEIGHT: 72 IN | BODY MASS INDEX: 42.48 KG/M2 | HEART RATE: 55 BPM | OXYGEN SATURATION: 98 % | SYSTOLIC BLOOD PRESSURE: 130 MMHG

## 2021-08-27 DIAGNOSIS — Z13.6 ENCOUNTER FOR SCREENING FOR CARDIOVASCULAR DISORDERS: ICD-10-CM

## 2021-08-27 DIAGNOSIS — E55.9 VITAMIN D DEFICIENCY: Primary | ICD-10-CM

## 2021-08-27 DIAGNOSIS — R63.5 WEIGHT GAIN FOLLOWING GASTRIC BYPASS SURGERY: ICD-10-CM

## 2021-08-27 DIAGNOSIS — R73.03 PREDIABETES: ICD-10-CM

## 2021-08-27 DIAGNOSIS — E11.21 CONTROLLED TYPE 2 DIABETES MELLITUS WITH DIABETIC NEPHROPATHY, WITHOUT LONG-TERM CURRENT USE OF INSULIN (HCC): ICD-10-CM

## 2021-08-27 DIAGNOSIS — Z98.84 WEIGHT GAIN FOLLOWING GASTRIC BYPASS SURGERY: ICD-10-CM

## 2021-08-27 PROCEDURE — 3066F NEPHROPATHY DOC TX: CPT | Performed by: INTERNAL MEDICINE

## 2021-08-27 PROCEDURE — 99214 OFFICE O/P EST MOD 30 MIN: CPT | Performed by: INTERNAL MEDICINE

## 2021-08-27 PROCEDURE — 3008F BODY MASS INDEX DOCD: CPT | Performed by: INTERNAL MEDICINE

## 2021-08-27 NOTE — PROGRESS NOTES
BMI Counseling: Body mass index is 42 53 kg/m²  The BMI is above normal  Nutrition recommendations include encouraging healthy choices of fruits and vegetables and consuming healthier snacks  Exercise recommendations include moderate physical activity 150 minutes/week  Depression Screening and Follow-up Plan: Clincally patient does not have depression  No treatment is required  Assessment/Plan:    Patient is a very pleasant 57-year-old male who is here for four-month follow-up  He states that he is doing well  He continues to work in his diet  He has lost approximately ache and lb  Working on his diet  He does have a history of bariatric surgery  He does not want to see weight management or bariatric surgery  At this time  He does admit to going on vacation and eating a little more than he should  He continues to take Contrave  He is vaccinated against COVID  He is fully vaccinated  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[place]  Labs ordered today  We will follow-up again in 4 months  He is up-to-date with his colonoscopy  No problem-specific Assessment & Plan notes found for this encounter  Diagnoses and all orders for this visit:    Vitamin D deficiency  -     Vitamin D 25 hydroxy; Future    Weight gain following gastric bypass surgery  -     CBC and differential; Future  -     TSH, 3rd generation with Free T4 reflex; Future    Prediabetes  -     Comprehensive metabolic panel; Future  -     HEMOGLOBIN A1C W/ EAG ESTIMATION; Future    Encounter for screening for cardiovascular disorders  -     Lipid panel; Future    Controlled type 2 diabetes mellitus with diabetic nephropathy, without long-term current use of insulin (HCC)          Subjective:      Patient ID: Anthony Birmingham is a 60 y o  male  57-year-old male here for follow-up visit  He does report some neck stiffness  States he may have slept wrong  Will continue to monitor    He denies any chest pain, shortness of breath, abdominal pain, bloody stools  The following portions of the patient's history were reviewed and updated as appropriate:   He  has a past medical history of Arthritis, History of bariatric surgery, Morbid obesity due to excess calories ([facility] ), Postsurgical malabsorption, Type 2 diabetes mellitus ([facility] ), Vitamin B1 deficiency, and Vitamin D deficiency    He   Patient Active Problem List    Diagnosis Date Noted    Controlled type 2 diabetes mellitus with diabetic nephropathy, without long-term current use of insulin ([facility] ) 08/27/2021    Screening for malignant neoplasm of colon 07/17/2020    Screening for malignant neoplasm of prostate 07/17/2020    Educated about COVID-19 virus infection 07/06/2020    Encounter for surgical aftercare following surgery of digestive system 12/10/2019    Prediabetes 11/07/2019    At risk for obstructive sleep apnea 10/17/2019    Obesity, Class III, BMI 40-49 9 (morbid obesity) ([facility] ) 07/12/2019    Vitamin B12 deficiency 04/25/2019    Ventral hernia 10/27/2017    Weight gain following gastric bypass surgery 92/72/1784    Lichen planus 81/42/2919    Seborrheic keratoses, inflamed 01/09/2017    Osteoarthritis of first metatarsophalangeal joint 09/06/2016    Balanitis 08/24/2016    Internal hemorrhoid 08/11/2015    Penile cyst 11/08/2014    Postgastrectomy malabsorption 03/24/2014    Vitamin D deficiency 93/63/6454    Umbilical hernia 25/51/1236     Current Outpatient Medications   Medication Sig Dispense Refill    CALCIUM CITRATE PO Take by mouth      Cholecalciferol (VITAMIN D3) 2000 units capsule Take 1 tablet by mouth daily      clobetasol (TEMOVATE) 0 05 % ointment Apply topically 2 (two) times a day 60 g 1    Cyanocobalamin (VITAMIN B-12) 5000 MCG SUBL Place under the tongue      ferrous sulfate 324 (65 Fe) mg Take 1 tablet (324 mg total) by mouth 2 (two) times a day before meals 90 tablet 2    Multiple Vitamin (MULTI-VITAMIN DAILY) TABS Take by mouth      Naltrexone-buPROPion HCl ER 8-90 MG TB12 1 tab daily x 7 days, 1 tab twice daily x 7 days, 2 tabs in AM and 1 tab in PM x 7 days, then 2 tabs twice daily 120 tablet 0     Current Facility-Administered Medications   Medication Dose Route Frequency Provider Last Rate Last Admin    cyanocobalamin injection 1,000 mcg  1,000 mcg Intramuscular Q30 Days NEAL Lundberg   1,000 mcg at 04/25/19 1106     Review of Systems   Musculoskeletal: Positive for arthralgias and neck pain  All other systems reviewed and are negative  Objective:      /78 (BP Location: Left arm, Patient Position: Sitting, Cuff Size: Large)   Pulse 55   Ht 6' (1 829 m)   Wt (!) 142 kg (313 lb 9 6 oz)   SpO2 98%   BMI 42 53 kg/m²          Physical Exam  Vitals and nursing note reviewed  Constitutional:       Appearance: Normal appearance  He is obese  HENT:      Head: Normocephalic and atraumatic  Mouth/Throat:      Mouth: Mucous membranes are moist    Cardiovascular:      Rate and Rhythm: Normal rate and regular rhythm  Heart sounds: Normal heart sounds  Pulmonary:      Effort: Pulmonary effort is normal       Breath sounds: Normal breath sounds  Abdominal:      General: Bowel sounds are normal       Palpations: Abdomen is soft  Hernia: A hernia is present  Musculoskeletal:         General: Normal range of motion  Cervical back: Normal range of motion  Right lower leg: No edema  Left lower leg: No edema  Skin:     General: Skin is warm and dry  Neurological:      General: No focal deficit present  Mental Status: He is alert and oriented to person, place, and time  Mental status is at baseline  Psychiatric:         Mood and Affect: Mood normal          Behavior: Behavior normal          Thought Content:  Thought content normal          Judgment: Judgment normal

## 2021-08-30 LAB
25(OH)D3+25(OH)D2 SERPL-MCNC: 16.7 NG/ML (ref 30–100)
ALBUMIN SERPL-MCNC: 4 G/DL (ref 3.8–4.9)
ALBUMIN/GLOB SERPL: 1.5 {RATIO} (ref 1.2–2.2)
ALP SERPL-CCNC: 78 IU/L (ref 48–121)
ALT SERPL-CCNC: 20 IU/L (ref 0–44)
AST SERPL-CCNC: 18 IU/L (ref 0–40)
BASOPHILS # BLD AUTO: 0 X10E3/UL (ref 0–0.2)
BASOPHILS NFR BLD AUTO: 1 %
BILIRUB SERPL-MCNC: 0.4 MG/DL (ref 0–1.2)
BUN SERPL-MCNC: 9 MG/DL (ref 6–24)
BUN/CREAT SERPL: 12 (ref 9–20)
CALCIUM SERPL-MCNC: 8.7 MG/DL (ref 8.7–10.2)
CHLORIDE SERPL-SCNC: 103 MMOL/L (ref 96–106)
CHOLEST SERPL-MCNC: 148 MG/DL (ref 100–199)
CO2 SERPL-SCNC: 24 MMOL/L (ref 20–29)
CREAT SERPL-MCNC: 0.73 MG/DL (ref 0.76–1.27)
EOSINOPHIL # BLD AUTO: 0.2 X10E3/UL (ref 0–0.4)
EOSINOPHIL NFR BLD AUTO: 2 %
ERYTHROCYTE [DISTWIDTH] IN BLOOD BY AUTOMATED COUNT: 13.1 % (ref 11.6–15.4)
EST. AVERAGE GLUCOSE BLD GHB EST-MCNC: 128 MG/DL
GLOBULIN SER-MCNC: 2.7 G/DL (ref 1.5–4.5)
GLUCOSE SERPL-MCNC: 87 MG/DL (ref 65–99)
HBA1C MFR BLD: 6.1 % (ref 4.8–5.6)
HCT VFR BLD AUTO: 40.8 % (ref 37.5–51)
HDLC SERPL-MCNC: 49 MG/DL
HGB BLD-MCNC: 13.6 G/DL (ref 13–17.7)
IMM GRANULOCYTES # BLD: 0 X10E3/UL (ref 0–0.1)
IMM GRANULOCYTES NFR BLD: 0 %
LDLC SERPL CALC-MCNC: 86 MG/DL (ref 0–99)
LYMPHOCYTES # BLD AUTO: 2.8 X10E3/UL (ref 0.7–3.1)
LYMPHOCYTES NFR BLD AUTO: 41 %
MCH RBC QN AUTO: 31 PG (ref 26.6–33)
MCHC RBC AUTO-ENTMCNC: 33.3 G/DL (ref 31.5–35.7)
MCV RBC AUTO: 93 FL (ref 79–97)
MONOCYTES # BLD AUTO: 0.7 X10E3/UL (ref 0.1–0.9)
MONOCYTES NFR BLD AUTO: 10 %
NEUTROPHILS # BLD AUTO: 3.2 X10E3/UL (ref 1.4–7)
NEUTROPHILS NFR BLD AUTO: 46 %
PLATELET # BLD AUTO: 245 X10E3/UL (ref 150–450)
POTASSIUM SERPL-SCNC: 4.2 MMOL/L (ref 3.5–5.2)
PROT SERPL-MCNC: 6.7 G/DL (ref 6–8.5)
RBC # BLD AUTO: 4.39 X10E6/UL (ref 4.14–5.8)
SL AMB EGFR AFRICAN AMERICAN: 118 ML/MIN/1.73
SL AMB EGFR NON AFRICAN AMERICAN: 102 ML/MIN/1.73
SL AMB VLDL CHOLESTEROL CALC: 13 MG/DL (ref 5–40)
SODIUM SERPL-SCNC: 136 MMOL/L (ref 134–144)
TRIGL SERPL-MCNC: 67 MG/DL (ref 0–149)
TSH SERPL DL<=0.005 MIU/L-ACNC: 1.48 UIU/ML (ref 0.45–4.5)
WBC # BLD AUTO: 6.8 X10E3/UL (ref 3.4–10.8)

## 2021-08-30 PROCEDURE — 3044F HG A1C LEVEL LT 7.0%: CPT | Performed by: INTERNAL MEDICINE

## 2022-01-05 DIAGNOSIS — E66.01 MORBID OBESITY WITH BMI OF 40.0-44.9, ADULT (HCC): ICD-10-CM

## 2022-01-05 DIAGNOSIS — E55.9 VITAMIN D DEFICIENCY: ICD-10-CM

## 2022-01-05 RX ORDER — ERGOCALCIFEROL 1.25 MG/1
50000 CAPSULE ORAL WEEKLY
Qty: 4 CAPSULE | Refills: 2 | Status: SHIPPED | OUTPATIENT
Start: 2022-01-05 | End: 2022-04-15

## 2022-01-05 NOTE — TELEPHONE ENCOUNTER
Patient is requesting a refill on ergocalciferol 71820 units, take 1 capsule (50,000 Units total) by mouth once a week for 12 doses; naltrexone-bupropion HCI ER 8-90 MG TB 12, 1 tab daily x 7 days, 1 tab twice daily x 7 days, 2 tabs in am and 1 tab in pm x 7 days, then 2 tabs twice daily  He also has an appointment on 02/11/2022 and would like to know if any labs should be done for this visit       [facility]/Durga Sibley    Please mail labs to patient at [address]    Call back #[phone]

## 2022-01-05 NOTE — TELEPHONE ENCOUNTER
Medications pended; patient looking for refills  Also, did you want to order routine labs? If so, they'll be mailed to the patient's home address

## 2022-02-25 ENCOUNTER — TELEPHONE (OUTPATIENT)
Dept: INTERNAL MEDICINE CLINIC | Facility: CLINIC | Age: 57
End: 2022-02-25

## 2022-02-25 NOTE — TELEPHONE ENCOUNTER
Patient scheduled an appt for two weeks and wants to if you want to order labs for this appt  Please call patient with response and a message can be left on machine if no answer

## 2022-03-11 ENCOUNTER — OFFICE VISIT (OUTPATIENT)
Dept: INTERNAL MEDICINE CLINIC | Facility: CLINIC | Age: 57
End: 2022-03-11
Payer: COMMERCIAL

## 2022-03-11 VITALS
BODY MASS INDEX: 42.04 KG/M2 | TEMPERATURE: 98.5 F | DIASTOLIC BLOOD PRESSURE: 86 MMHG | HEIGHT: 72 IN | HEART RATE: 71 BPM | SYSTOLIC BLOOD PRESSURE: 138 MMHG | OXYGEN SATURATION: 98 % | WEIGHT: 310.4 LBS

## 2022-03-11 DIAGNOSIS — Z23 NEED FOR VACCINATION: ICD-10-CM

## 2022-03-11 DIAGNOSIS — I10 PRIMARY HYPERTENSION: ICD-10-CM

## 2022-03-11 DIAGNOSIS — Z90.3 POSTGASTRECTOMY MALABSORPTION: Primary | ICD-10-CM

## 2022-03-11 DIAGNOSIS — E11.21 CONTROLLED TYPE 2 DIABETES MELLITUS WITH DIABETIC NEPHROPATHY, WITHOUT LONG-TERM CURRENT USE OF INSULIN (HCC): ICD-10-CM

## 2022-03-11 DIAGNOSIS — E66.01 MORBID OBESITY WITH BMI OF 40.0-44.9, ADULT (HCC): ICD-10-CM

## 2022-03-11 DIAGNOSIS — R73.9 BLOOD GLUCOSE ELEVATED: ICD-10-CM

## 2022-03-11 DIAGNOSIS — K91.2 POSTGASTRECTOMY MALABSORPTION: Primary | ICD-10-CM

## 2022-03-11 DIAGNOSIS — K42.9 UMBILICAL HERNIA WITHOUT OBSTRUCTION AND WITHOUT GANGRENE: ICD-10-CM

## 2022-03-11 PROCEDURE — 4010F ACE/ARB THERAPY RXD/TAKEN: CPT | Performed by: STUDENT IN AN ORGANIZED HEALTH CARE EDUCATION/TRAINING PROGRAM

## 2022-03-11 PROCEDURE — 3725F SCREEN DEPRESSION PERFORMED: CPT | Performed by: INTERNAL MEDICINE

## 2022-03-11 PROCEDURE — 90732 PPSV23 VACC 2 YRS+ SUBQ/IM: CPT | Performed by: INTERNAL MEDICINE

## 2022-03-11 PROCEDURE — 3066F NEPHROPATHY DOC TX: CPT | Performed by: STUDENT IN AN ORGANIZED HEALTH CARE EDUCATION/TRAINING PROGRAM

## 2022-03-11 PROCEDURE — 90471 IMMUNIZATION ADMIN: CPT | Performed by: INTERNAL MEDICINE

## 2022-03-11 PROCEDURE — 99214 OFFICE O/P EST MOD 30 MIN: CPT | Performed by: INTERNAL MEDICINE

## 2022-03-11 RX ORDER — LISINOPRIL 5 MG/1
5 TABLET ORAL DAILY
Qty: 90 TABLET | Refills: 5 | Status: SHIPPED | OUTPATIENT
Start: 2022-03-11 | End: 2022-04-06

## 2022-03-11 RX ORDER — ATORVASTATIN CALCIUM 40 MG/1
40 TABLET, FILM COATED ORAL DAILY
Qty: 30 TABLET | Refills: 5 | Status: SHIPPED | OUTPATIENT
Start: 2022-03-11

## 2022-03-11 NOTE — PROGRESS NOTES
Assessment/Plan:     Pt is here with uncontrolled HTN and ASCVD risk of 37 1 %  a1c is pending  Added statin and ACEI  Surgery referral for hernia  It causes pain and gets in the way when he is exercising  Quality Measures:     BMI Counseling: Body mass index is 42 1 kg/m²  The BMI is above normal  Nutrition recommendations include decreasing portion sizes and encouraging healthy choices of fruits and vegetables  Exercise recommendations include moderate physical activity 150 minutes/week  Rationale for BMI follow-up plan is due to patient being overweight or obese  Depression Screening and Follow-up Plan: Patient was screened for depression during today's encounter  They screened negative with a PHQ-2 score of 1  Return in about 4 weeks (around 4/8/2022)  No problem-specific Assessment & Plan notes found for this encounter  Diagnoses and all orders for this visit:    Postgastrectomy malabsorption  -     CBC and differential; Future  -     Comprehensive metabolic panel; Future  -     CBC and differential; Future  -     Comprehensive metabolic panel; Future  -     Hemoglobin A1C; Future    Morbid obesity with BMI of 40 0-44 9, adult (HCC)    Blood glucose elevated  -     Lipid Panel with Direct LDL reflex; Future  -     Hemoglobin A1C; Future  -     CBC and differential; Future  -     Comprehensive metabolic panel; Future  -     Lipid Panel with Direct LDL reflex; Future  -     Hemoglobin A1C; Future    Controlled type 2 diabetes mellitus with diabetic nephropathy, without long-term current use of insulin ([facility])  -     atorvastatin (LIPITOR) 40 mg tablet; Take 1 tablet (40 mg total) by mouth daily    Umbilical hernia without obstruction and without gangrene  -     Ambulatory Referral to General Surgery; Future    Primary hypertension  -     lisinopril (ZESTRIL) 5 mg tablet; Take 1 tablet (5 mg total) by mouth daily          Subjective:      Patient ID: Guerline Li is a 60 y o  male     Lynda Piña is here for routine follow up  We discussed his labs and reviewed his medication regimen  BP is uncontrolled  Will add ACEI  ASCVD risk score 37 1 %  Add statin 40 mg     F/u in 2 weeks for BP check        ALLERGIES:  Allergies   Allergen Reactions    Prunus Persica Anaphylaxis     Also frankie, plums, apricot, nectarine       CURRENT MEDICATIONS:    Current Outpatient Medications:     CALCIUM CITRATE PO, Take by mouth, Disp: , Rfl:     Cholecalciferol (VITAMIN D3) 2000 units capsule, Take 1 tablet by mouth daily, Disp: , Rfl:     clobetasol (TEMOVATE) 0 05 % ointment, Apply topically 2 (two) times a day, Disp: 60 g, Rfl: 1    Cyanocobalamin (VITAMIN B-12) 5000 MCG SUBL, Place under the tongue, Disp: , Rfl:     ergocalciferol (VITAMIN D2) 50,000 units, Take 1 capsule (50,000 Units total) by mouth once a week for 12 doses, Disp: 4 capsule, Rfl: 2    ferrous sulfate 324 (65 Fe) mg, Take 1 tablet (324 mg total) by mouth 2 (two) times a day before meals, Disp: 90 tablet, Rfl: 2    Multiple Vitamin (MULTI-VITAMIN DAILY) TABS, Take by mouth, Disp: , Rfl:     Naltrexone-buPROPion HCl ER 8-90 MG TB12, 1 tab daily x 7 days, 1 tab twice daily x 7 days, 2 tabs in AM and 1 tab in PM x 7 days, then 2 tabs twice daily, Disp: 120 tablet, Rfl: 0    atorvastatin (LIPITOR) 40 mg tablet, Take 1 tablet (40 mg total) by mouth daily, Disp: 30 tablet, Rfl: 5    lisinopril (ZESTRIL) 5 mg tablet, Take 1 tablet (5 mg total) by mouth daily, Disp: 90 tablet, Rfl: 5    Current Facility-Administered Medications:     cyanocobalamin injection 1,000 mcg, 1,000 mcg, Intramuscular, Q30 Days, NEAL Lundberg, 1,000 mcg at 04/25/19 1106    ACTIVE PROBLEM LIST:  Patient Active Problem List   Diagnosis    Balanitis    Osteoarthritis of first metatarsophalangeal joint    Penile cyst    Ventral hernia    Umbilical hernia    Vitamin D deficiency    Weight gain following gastric bypass surgery    Lichen planus    Internal hemorrhoid    Postgastrectomy malabsorption    Seborrheic keratoses, inflamed    Vitamin B12 deficiency    Obesity, Class III, BMI 40-49 9 (morbid obesity) ([facility] )    At risk for obstructive sleep apnea    Prediabetes    Encounter for surgical aftercare following surgery of digestive system    Educated about COVID-19 virus infection    Screening for malignant neoplasm of colon    Screening for malignant neoplasm of prostate    Controlled type 2 diabetes mellitus with diabetic nephropathy, without long-term current use of insulin ([facility] )       PAST MEDICAL HISTORY:  Past Medical History:   Diagnosis Date    Arthritis     Last assessed: 12    History of bariatric surgery     Morbid obesity due to excess calories ([facility] )     Last assessed: 12    Postsurgical malabsorption     Type 2 diabetes mellitus ([facility] )     Last assessed: 6/5/15    Vitamin B1 deficiency     Last assessed: 14    Vitamin D deficiency     Last assessed: 14       PAST SURGICAL HISTORY:  Past Surgical History:   Procedure Laterality Date    GASTRIC BYPASS      For Morbid Obesity  Managed by: [employer]   Last assessed: 14    HERNIA REPAIR         FAMILY HISTORY:  Family History   Problem Relation Age of Onset    Heart failure Mother         Congestive    Hypertension Mother     Heart disease Mother     Bone cancer Father     Throat cancer Father         Laryngeal    Diabetes Neg Hx     Thyroid disease Neg Hx     Stroke Neg Hx        SOCIAL HISTORY:  Social History     Socioeconomic History    Marital status: /Civil Union     Spouse name: Not on file    Number of children: Not on file    Years of education: Not on file    Highest education level: Not on file   Occupational History    Not on file   Tobacco Use    Smoking status: Current Some Day Smoker     Last attempt to quit: 2010     Years since quittin 9    Smokeless tobacco: Never Used    Tobacco comment: occasionally every couple days   Vaping Use    Vaping Use: Never used   Substance and Sexual Activity    Alcohol use: Yes     Comment: Social    Drug use: No    Sexual activity: Yes     Partners: Female   Other Topics Concern    Not on file   Social History Narrative    Not on file     Social Determinants of Health     Financial Resource Strain: Not on file   Food Insecurity: Not on file   Transportation Needs: Not on file   Physical Activity: Not on file   Stress: Not on file   Social Connections: Not on file   Intimate Partner Violence: Not on file   Housing Stability: Not on file       Review of Systems   HENT: Negative for congestion  Respiratory: Negative for cough and shortness of breath  Cardiovascular: Negative for chest pain and palpitations  Gastrointestinal: Positive for abdominal pain  Negative for abdominal distention and blood in stool  All other systems reviewed and are negative  Objective:  Vitals:    03/11/22 0945   BP: 138/86   BP Location: Left arm   Patient Position: Sitting   Cuff Size: Large   Pulse: 71   Temp: 98 5 °F (36 9 °C)   TempSrc: Tympanic   SpO2: 98%   Weight: (!) 141 kg (310 lb 6 4 oz)   Height: 6' (1 829 m)     Body mass index is 42 1 kg/m²  Physical Exam  Vitals and nursing note reviewed  Constitutional:       Appearance: Normal appearance  He is obese  HENT:      Head: Normocephalic and atraumatic  Right Ear: Tympanic membrane normal       Left Ear: Tympanic membrane normal       Mouth/Throat:      Mouth: Mucous membranes are moist       Pharynx: Oropharynx is clear  Cardiovascular:      Rate and Rhythm: Normal rate and regular rhythm  Heart sounds: Normal heart sounds  Pulmonary:      Effort: Pulmonary effort is normal       Breath sounds: Normal breath sounds  Abdominal:      Palpations: Abdomen is soft  Hernia: A hernia is present  Musculoskeletal:         General: Normal range of motion        Cervical back: Normal range of motion  Right lower leg: No edema  Left lower leg: No edema  Skin:     General: Skin is warm and dry  Neurological:      General: No focal deficit present  Mental Status: He is alert and oriented to person, place, and time  Mental status is at baseline  RESULTS:    Recent Results (from the past 1008 hour(s))   Microalbumin / creatinine urine ratio    Collection Time: 03/09/22 12:00 AM   Result Value Ref Range    EXT Creatinine Urine 172 0     EXTERNAL Microalb/Creat Ratio <2        This note was created with voice recognition software  Phonic, grammatical and spelling errors may be present within the note as a result

## 2022-03-18 ENCOUNTER — CONSULT (OUTPATIENT)
Dept: SURGERY | Facility: CLINIC | Age: 57
End: 2022-03-18
Payer: COMMERCIAL

## 2022-03-18 VITALS
DIASTOLIC BLOOD PRESSURE: 100 MMHG | SYSTOLIC BLOOD PRESSURE: 142 MMHG | BODY MASS INDEX: 42.66 KG/M2 | OXYGEN SATURATION: 98 % | HEART RATE: 65 BPM | HEIGHT: 72 IN | WEIGHT: 315 LBS

## 2022-03-18 DIAGNOSIS — K42.0 RECURRENT UMBILICAL HERNIA WITH INCARCERATION: Primary | ICD-10-CM

## 2022-03-18 DIAGNOSIS — Z98.84 WEIGHT GAIN FOLLOWING GASTRIC BYPASS SURGERY: ICD-10-CM

## 2022-03-18 DIAGNOSIS — E66.01 OBESITY, CLASS III, BMI 40-49.9 (MORBID OBESITY) (HCC): ICD-10-CM

## 2022-03-18 DIAGNOSIS — R63.5 WEIGHT GAIN FOLLOWING GASTRIC BYPASS SURGERY: ICD-10-CM

## 2022-03-18 PROCEDURE — 99204 OFFICE O/P NEW MOD 45 MIN: CPT | Performed by: STUDENT IN AN ORGANIZED HEALTH CARE EDUCATION/TRAINING PROGRAM

## 2022-03-18 PROCEDURE — 3008F BODY MASS INDEX DOCD: CPT | Performed by: STUDENT IN AN ORGANIZED HEALTH CARE EDUCATION/TRAINING PROGRAM

## 2022-03-18 NOTE — PROGRESS NOTES
Assessment/Plan:  60year old male with umbilical hernia  -patient presents with umbilical hernia that has recurred  -states he had an umbilical hernia repair with mesh many years ago in the hernia has recurred, most recently it has gotten slightly larger and causes him intermittent discomfort  -denies any obstructive symptoms  -on exam patient has an umbilical hernia that is incarcerated, most likely containing omental fat  -bariatric note from 07/12/2019 reviewed, patient does live closer to [facility] so address and phone number given  -referral placed for Weight Management  -colonoscopy report from 08/22/2020 reviewed  -primary care physician note from 03/11/2022 reviewed  -CBC, CMP, hemoglobin A1c from 08/27/2021 reviewed  -CT scan of the abdomen pelvis from 11/01/2017 report and images reviewed  -will order CT scan of the abdomen and pelvis with IV contrast to evaluate where the mesh is placed and also the size of the hernia defect and its contents  -discussed with patient at length that to have an adequate repair and reduce the chance of recurrence of the hernia I would recommend weight loss he would need to lose roughly 50 lb to get him to a BMI around 37, patient states he has ambitious and would like to continue to lose weight  -follow-up in office after CT scan done reviewed results     1  Recurrent umbilical hernia with incarceration  -     Ambulatory Referral to General Surgery  -     CT abdomen pelvis w contrast; Future; Expected date: 03/18/2022    2  Obesity, Class III, BMI 40-49 9 (morbid obesity) ([facility] )  -     Ambulatory Referral to Weight Management; Future    3  Weight gain following gastric bypass surgery  -     Ambulatory Referral to Weight Management; Future               Subjective:      Patient ID: Martell Chu is a 60 y o  male  Triage Notes:    Patient is a 78-year-old male who presents to office for evaluation of recurrent umbilical hernia    Patient noticed a bulge right above his belly button is causing him intermittent discomfort  He has a history of an open hernia repair with mesh that was done in this same area  He states he had a bulge there for some time after the repair but recently it is causing him intermittent discomfort  Most times with movement or strenuous activity when he is trying to work out  He denies any obstructive symptoms  He has been tolerating diet well and having normal bowel movements  The following portions of the patient's history were reviewed and updated as appropriate:   He  has a past medical history of Arthritis, History of bariatric surgery, Morbid obesity due to excess calories ([address] ), Obesity, Postsurgical malabsorption, Type 2 diabetes mellitus ([address] ), Vitamin B1 deficiency, and Vitamin D deficiency  He   Patient Active Problem List    Diagnosis Date Noted    Controlled type 2 diabetes mellitus with diabetic nephropathy, without long-term current use of insulin ([facility] ) 08/27/2021    Screening for malignant neoplasm of colon 07/17/2020    Screening for malignant neoplasm of prostate 07/17/2020    Educated about COVID-19 virus infection 07/06/2020    Encounter for surgical aftercare following surgery of digestive system 12/10/2019    Prediabetes 11/07/2019    At risk for obstructive sleep apnea 10/17/2019    Obesity, Class III, BMI 40-49 9 (morbid obesity) ([facility] ) 07/12/2019    Vitamin B12 deficiency 04/25/2019    Ventral hernia 10/27/2017    Weight gain following gastric bypass surgery 76/59/9363    Lichen planus 44/00/1072    Seborrheic keratoses, inflamed 01/09/2017    Osteoarthritis of first metatarsophalangeal joint 09/06/2016    Balanitis 08/24/2016    Internal hemorrhoid 08/11/2015    Penile cyst 11/08/2014    Postgastrectomy malabsorption 03/24/2014    Vitamin D deficiency 78/17/7103    Umbilical hernia 69/77/6435     He  has a past surgical history that includes Gastric bypass and Hernia repair    His family history includes Bone cancer in his father; Heart disease in his mother; Heart failure in his mother; Hypertension in his mother; Throat cancer in his father  He  reports that he has been smoking  He has never used smokeless tobacco  He reports current alcohol use  He reports that he does not use drugs  Current Outpatient Medications on File Prior to Visit   Medication Sig    atorvastatin (LIPITOR) 40 mg tablet Take 1 tablet (40 mg total) by mouth daily    CALCIUM CITRATE PO Take by mouth    Cholecalciferol (VITAMIN D3) 2000 units capsule Take 1 tablet by mouth daily    clobetasol (TEMOVATE) 0 05 % ointment Apply topically 2 (two) times a day    Cyanocobalamin (VITAMIN B-12) 5000 MCG SUBL Place under the tongue    ergocalciferol (VITAMIN D2) 50,000 units Take 1 capsule (50,000 Units total) by mouth once a week for 12 doses    ferrous sulfate 324 (65 Fe) mg Take 1 tablet (324 mg total) by mouth 2 (two) times a day before meals    lisinopril (ZESTRIL) 5 mg tablet Take 1 tablet (5 mg total) by mouth daily    Multiple Vitamin (MULTI-VITAMIN DAILY) TABS Take by mouth    Naltrexone-buPROPion HCl ER 8-90 MG TB12 1 tab daily x 7 days, 1 tab twice daily x 7 days, 2 tabs in AM and 1 tab in PM x 7 days, then 2 tabs twice daily     Current Facility-Administered Medications on File Prior to Visit   Medication    cyanocobalamin injection 1,000 mcg     He is allergic to prunus persica       Review of Systems   Constitutional: Negative for chills, fatigue and fever  HENT: Negative for congestion, hearing loss, rhinorrhea and sore throat  Eyes: Negative for pain and discharge  Respiratory: Negative for cough, chest tightness and shortness of breath  Cardiovascular: Negative for chest pain and palpitations  Gastrointestinal: Negative for abdominal pain, constipation, diarrhea, nausea and vomiting  Positive umbilical bulge   Endocrine: Negative for cold intolerance and heat intolerance     Genitourinary: Negative for difficulty urinating and dysuria  Musculoskeletal: Negative for back pain and neck pain  Skin: Negative for color change and rash  Allergic/Immunologic: Negative for environmental allergies and food allergies  Neurological: Negative for seizures and headaches  Hematological: Negative for adenopathy  Does not bruise/bleed easily  Psychiatric/Behavioral: Negative for confusion and hallucinations  Objective:      /100   Pulse 65   Ht 6' (1 829 m)   Wt (!) 146 kg (321 lb)   SpO2 98%   BMI 43 54 kg/m²     Below is the patient's most recent value for Albumin, ALT, AST, BUN, Calcium, Chloride, Cholesterol, CO2, Creatinine, GFR, Glucose, HDL, Hematocrit, Hemoglobin, Hemoglobin A1C, LDL, Magnesium, Phosphorus, Platelets, Potassium, PSA, Sodium, Triglycerides, and WBC  Lab Results   Component Value Date    ALT 20 08/27/2021    AST 18 08/27/2021    BUN 9 08/27/2021    CALCIUM 8 2 (L) 06/19/2020     08/27/2021    CHOL 155 09/26/2017    CO2 24 08/27/2021    CREATININE 0 73 (L) 08/27/2021    HDL 49 08/27/2021    HCT 40 8 08/27/2021    HGB 13 6 08/27/2021    HGBA1C 6 1 (H) 08/27/2021    MG 1 9 06/19/2020     08/27/2021    K 4 2 08/27/2021    PSA 0 3 02/19/2021     09/26/2017    TRIG 67 08/27/2021    WBC 6 8 08/27/2021     Note: for a comprehensive list of the patient's lab results, access the Results Review activity  Physical Exam  Constitutional:       Appearance: Normal appearance  He is obese  HENT:      Head: Normocephalic and atraumatic  Nose: Nose normal    Eyes:      General: No scleral icterus  Conjunctiva/sclera: Conjunctivae normal    Cardiovascular:      Rate and Rhythm: Normal rate and regular rhythm  Heart sounds: Normal heart sounds  Pulmonary:      Effort: Pulmonary effort is normal       Breath sounds: Normal breath sounds  Abdominal:      Palpations: Abdomen is soft  Tenderness: There is no abdominal tenderness        Comments: There is a supraumbilical hernia present which is incarcerated, most likely containing fat, this is tender to deep palpation, size of the hernia defect unable to be palpated   Musculoskeletal:         General: No signs of injury  Skin:     General: Skin is warm  Coloration: Skin is not jaundiced  Neurological:      General: No focal deficit present  Mental Status: He is alert and oriented to person, place, and time     Psychiatric:         Mood and Affect: Mood normal          Behavior: Behavior normal

## 2022-03-19 LAB
ALBUMIN SERPL-MCNC: 4.2 G/DL (ref 3.8–4.9)
ALBUMIN/GLOB SERPL: 1.7 {RATIO} (ref 1.2–2.2)
ALP SERPL-CCNC: 74 IU/L (ref 44–121)
ALT SERPL-CCNC: 34 IU/L (ref 0–44)
AST SERPL-CCNC: 28 IU/L (ref 0–40)
BASOPHILS # BLD AUTO: 0 X10E3/UL (ref 0–0.2)
BASOPHILS NFR BLD AUTO: 1 %
BILIRUB SERPL-MCNC: 0.3 MG/DL (ref 0–1.2)
BUN SERPL-MCNC: 9 MG/DL (ref 6–24)
BUN/CREAT SERPL: 11 (ref 9–20)
CALCIUM SERPL-MCNC: 8.6 MG/DL (ref 8.7–10.2)
CHLORIDE SERPL-SCNC: 106 MMOL/L (ref 96–106)
CHOLEST SERPL-MCNC: 106 MG/DL (ref 100–199)
CO2 SERPL-SCNC: 23 MMOL/L (ref 20–29)
CREAT SERPL-MCNC: 0.85 MG/DL (ref 0.76–1.27)
EGFR: 101 ML/MIN/1.73
EOSINOPHIL # BLD AUTO: 0.2 X10E3/UL (ref 0–0.4)
EOSINOPHIL NFR BLD AUTO: 3 %
ERYTHROCYTE [DISTWIDTH] IN BLOOD BY AUTOMATED COUNT: 13 % (ref 11.6–15.4)
GLOBULIN SER-MCNC: 2.5 G/DL (ref 1.5–4.5)
GLUCOSE SERPL-MCNC: 98 MG/DL (ref 65–99)
HBA1C MFR BLD: 6 % (ref 4.8–5.6)
HCT VFR BLD AUTO: 41.8 % (ref 37.5–51)
HDLC SERPL-MCNC: 45 MG/DL
HGB BLD-MCNC: 13.4 G/DL (ref 13–17.7)
IMM GRANULOCYTES # BLD: 0 X10E3/UL (ref 0–0.1)
IMM GRANULOCYTES NFR BLD: 0 %
LDLC SERPL CALC-MCNC: 50 MG/DL (ref 0–99)
LYMPHOCYTES # BLD AUTO: 1.9 X10E3/UL (ref 0.7–3.1)
LYMPHOCYTES NFR BLD AUTO: 30 %
MCH RBC QN AUTO: 29.6 PG (ref 26.6–33)
MCHC RBC AUTO-ENTMCNC: 32.1 G/DL (ref 31.5–35.7)
MCV RBC AUTO: 92 FL (ref 79–97)
MONOCYTES # BLD AUTO: 0.5 X10E3/UL (ref 0.1–0.9)
MONOCYTES NFR BLD AUTO: 8 %
NEUTROPHILS # BLD AUTO: 3.6 X10E3/UL (ref 1.4–7)
NEUTROPHILS NFR BLD AUTO: 58 %
PLATELET # BLD AUTO: 246 X10E3/UL (ref 150–450)
POTASSIUM SERPL-SCNC: 4.4 MMOL/L (ref 3.5–5.2)
PROT SERPL-MCNC: 6.7 G/DL (ref 6–8.5)
RBC # BLD AUTO: 4.53 X10E6/UL (ref 4.14–5.8)
SODIUM SERPL-SCNC: 143 MMOL/L (ref 134–144)
TRIGL SERPL-MCNC: 42 MG/DL (ref 0–149)
WBC # BLD AUTO: 6.2 X10E3/UL (ref 3.4–10.8)

## 2022-03-19 PROCEDURE — 3044F HG A1C LEVEL LT 7.0%: CPT | Performed by: STUDENT IN AN ORGANIZED HEALTH CARE EDUCATION/TRAINING PROGRAM

## 2022-04-06 ENCOUNTER — TELEPHONE (OUTPATIENT)
Dept: INTERNAL MEDICINE CLINIC | Facility: CLINIC | Age: 57
End: 2022-04-06

## 2022-04-06 ENCOUNTER — DOCUMENTATION (OUTPATIENT)
Dept: INTERNAL MEDICINE CLINIC | Facility: CLINIC | Age: 57
End: 2022-04-06

## 2022-04-06 DIAGNOSIS — I10 PRIMARY HYPERTENSION: Primary | ICD-10-CM

## 2022-04-06 PROCEDURE — 4010F ACE/ARB THERAPY RXD/TAKEN: CPT | Performed by: INTERNAL MEDICINE

## 2022-04-06 RX ORDER — LISINOPRIL 20 MG/1
20 TABLET ORAL DAILY
Qty: 30 TABLET | Refills: 0 | Status: SHIPPED | OUTPATIENT
Start: 2022-04-06 | End: 2022-05-06

## 2022-04-06 NOTE — TELEPHONE ENCOUNTER
----- Message from Karma Toscano MD sent at 4/6/2022  5:34 PM EDT -----  Regarding: RE: bp check  Hi Aaron can we tell him to take 20 mg of his lisinopril  He has 5 mg tablets so he could take four of them and Ill send a new prescription  You can take it today now  ----- Message -----  From: Floyd White MA  Sent: 4/6/2022   5:28 PM EDT  To: Karma Toscano MD  Subject: bp check                                         Patient stopped in for a bp check 178/100 asymptomatic at this time any changes needed on his medication?

## 2022-04-15 ENCOUNTER — OFFICE VISIT (OUTPATIENT)
Dept: INTERNAL MEDICINE CLINIC | Facility: CLINIC | Age: 57
End: 2022-04-15
Payer: COMMERCIAL

## 2022-04-15 VITALS
SYSTOLIC BLOOD PRESSURE: 132 MMHG | TEMPERATURE: 98.6 F | BODY MASS INDEX: 42.61 KG/M2 | WEIGHT: 314.6 LBS | DIASTOLIC BLOOD PRESSURE: 84 MMHG | OXYGEN SATURATION: 98 % | HEIGHT: 72 IN | HEART RATE: 58 BPM

## 2022-04-15 DIAGNOSIS — I10 PRIMARY HYPERTENSION: Primary | ICD-10-CM

## 2022-04-15 DIAGNOSIS — E55.9 VITAMIN D DEFICIENCY: ICD-10-CM

## 2022-04-15 DIAGNOSIS — Z12.5 PROSTATE CANCER SCREENING: ICD-10-CM

## 2022-04-15 DIAGNOSIS — R73.03 PREDIABETES: ICD-10-CM

## 2022-04-15 PROCEDURE — 3079F DIAST BP 80-89 MM HG: CPT | Performed by: INTERNAL MEDICINE

## 2022-04-15 PROCEDURE — 3075F SYST BP GE 130 - 139MM HG: CPT | Performed by: INTERNAL MEDICINE

## 2022-04-15 PROCEDURE — 3008F BODY MASS INDEX DOCD: CPT | Performed by: INTERNAL MEDICINE

## 2022-04-15 PROCEDURE — 99214 OFFICE O/P EST MOD 30 MIN: CPT | Performed by: INTERNAL MEDICINE

## 2022-04-15 NOTE — PROGRESS NOTES
Assessment/Plan:     HTN is better controlled  Continue lisinopril 20 mg      Quality Measures:     BMI Counseling: Body mass index is 42 67 kg/m²  The BMI is above normal  Nutrition recommendations include decreasing portion sizes and encouraging healthy choices of fruits and vegetables  Rationale for BMI follow-up plan is due to patient being overweight or obese  Depression Screening and Follow-up Plan: Patient assessed for underlying major depression  Brief counseling provided and recommend additional follow-up/re-evaluation next office visit  Return in about 6 months (around 10/15/2022)  No problem-specific Assessment & Plan notes found for this encounter  Diagnoses and all orders for this visit:    Primary hypertension  -     CBC and differential; Future  -     Comprehensive metabolic panel; Future  -     Lipid Panel with Direct LDL reflex; Future    Prediabetes  -     Hemoglobin A1C; Future    Prostate cancer screening  -     PSA, total and free; Future          Subjective:      Patient ID: Roge Paez is a 60 y o  male  Pt is here to f/u bp        ALLERGIES:  Allergies   Allergen Reactions    Prunus Persica Anaphylaxis     Also frankie, plums, apricot, nectarine       CURRENT MEDICATIONS:    Current Outpatient Medications:     atorvastatin (LIPITOR) 40 mg tablet, Take 1 tablet (40 mg total) by mouth daily, Disp: 30 tablet, Rfl: 5    CALCIUM CITRATE PO, Take by mouth, Disp: , Rfl:     Cholecalciferol (VITAMIN D3) 2000 units capsule, Take 1 tablet by mouth daily, Disp: , Rfl:     clobetasol (TEMOVATE) 0 05 % ointment, Apply topically 2 (two) times a day, Disp: 60 g, Rfl: 1    Cyanocobalamin (VITAMIN B-12) 5000 MCG SUBL, Place under the tongue, Disp: , Rfl:     ergocalciferol (VITAMIN D2) 50,000 units, Take 1 capsule (50,000 Units total) by mouth once a week for 12 doses, Disp: 4 capsule, Rfl: 2    ferrous sulfate 324 (65 Fe) mg, Take 1 tablet (324 mg total) by mouth 2 (two) times a day before meals, Disp: 90 tablet, Rfl: 2    lisinopril (ZESTRIL) 20 mg tablet, Take 1 tablet (20 mg total) by mouth daily, Disp: 30 tablet, Rfl: 0    Multiple Vitamin (MULTI-VITAMIN DAILY) TABS, Take by mouth, Disp: , Rfl:     Naltrexone-buPROPion HCl ER 8-90 MG TB12, 1 tab daily x 7 days, 1 tab twice daily x 7 days, 2 tabs in AM and 1 tab in PM x 7 days, then 2 tabs twice daily, Disp: 120 tablet, Rfl: 0    Current Facility-Administered Medications:     cyanocobalamin injection 1,000 mcg, 1,000 mcg, Intramuscular, Q30 Days, nova scotia, CRNP, 1,000 mcg at 04/25/19 1106    ACTIVE PROBLEM LIST:  Patient Active Problem List   Diagnosis    Balanitis    Osteoarthritis of first metatarsophalangeal joint    Penile cyst    Ventral hernia    Umbilical hernia    Vitamin D deficiency    Weight gain following gastric bypass surgery    Lichen planus    Internal hemorrhoid    Postgastrectomy malabsorption    Seborrheic keratoses, inflamed    Vitamin B12 deficiency    Obesity, Class III, BMI 40-49 9 (morbid obesity) ([facility]    At risk for obstructive sleep apnea    Prediabetes    Encounter for surgical aftercare following surgery of digestive system    Educated about COVID-19 virus infection    Screening for malignant neoplasm of colon    Screening for malignant neoplasm of prostate    Controlled type 2 diabetes mellitus with diabetic nephropathy, without long-term current use of insulin ([facility] [facility] )       PAST MEDICAL HISTORY:  Past Medical History:   Diagnosis Date    Arthritis     Last assessed: 5/21/12    History of bariatric surgery     Morbid obesity due to excess calories ([facility] [facility] )     Last assessed: 5/21/12    Obesity     Postsurgical malabsorption     Type 2 diabetes mellitus ([facility] [address] [facility] )     Last assessed: 6/5/15    Vitamin B1 deficiency     Last assessed: 9/29/14    Vitamin D deficiency     Last assessed: 9/29/14       PAST SURGICAL HISTORY:  Past Surgical History:   Procedure Laterality Date    GASTRIC BYPASS      For Morbid Obesity  Managed by: Doylene Carrier  Last assessed: 14    HERNIA REPAIR         FAMILY HISTORY:  Family History   Problem Relation Age of Onset    Heart failure Mother         Congestive    Hypertension Mother     Heart disease Mother     Bone cancer Father     Throat cancer Father         Laryngeal    Diabetes Neg Hx     Thyroid disease Neg Hx     Stroke Neg Hx        SOCIAL HISTORY:  Social History     Socioeconomic History    Marital status: /Civil Union     Spouse name: Not on file    Number of children: Not on file    Years of education: Not on file    Highest education level: Not on file   Occupational History    Not on file   Tobacco Use    Smoking status: Current Some Day Smoker     Last attempt to quit: 2010     Years since quittin 0    Smokeless tobacco: Never Used    Tobacco comment: occasionally every couple days   Vaping Use    Vaping Use: Never used   Substance and Sexual Activity    Alcohol use: Yes     Comment: Social    Drug use: No    Sexual activity: Yes     Partners: Female   Other Topics Concern    Not on file   Social History Narrative    Not on file     Social Determinants of Health     Financial Resource Strain: Not on file   Food Insecurity: Not on file   Transportation Needs: Not on file   Physical Activity: Not on file   Stress: Not on file   Social Connections: Not on file   Intimate Partner Violence: Not on file   Housing Stability: Not on file       Review of Systems   All other systems reviewed and are negative  Objective:  Vitals:    04/15/22 0935   BP: 132/84   BP Location: Right arm   Patient Position: Sitting   Cuff Size: Adult   Pulse: 58   Temp: 98 6 °F (37 °C)   TempSrc: Tympanic   SpO2: 98%   Weight: (!) 143 kg (314 lb 9 6 oz)   Height: 6' (1 829 m)     Body mass index is 42 67 kg/m²  Physical Exam  Vitals and nursing note reviewed  Constitutional:       Appearance: Normal appearance   He is obese  HENT:      Head: Normocephalic and atraumatic  Nose: Nose normal       Mouth/Throat:      Mouth: Mucous membranes are moist       Pharynx: Oropharynx is clear  Cardiovascular:      Rate and Rhythm: Normal rate  Pulmonary:      Effort: Pulmonary effort is normal    Musculoskeletal:         General: Normal range of motion  Cervical back: Normal range of motion  Skin:     General: Skin is warm and dry  Neurological:      General: No focal deficit present  Mental Status: He is alert and oriented to person, place, and time  Mental status is at baseline     Psychiatric:         Mood and Affect: Mood normal            RESULTS:    Recent Results (from the past 1008 hour(s))   Microalbumin / creatinine urine ratio    Collection Time: 03/09/22 12:00 AM   Result Value Ref Range    EXT Creatinine Urine 172 0     EXTERNAL Microalb/Creat Ratio <2    Ambig Abbrev Default    Collection Time: 03/18/22  9:03 AM   Result Value Ref Range    White Blood Cell Count 6 2 3 4 - 10 8 x10E3/uL    Red Blood Cell Count 4 53 4 14 - 5 80 x10E6/uL    Hemoglobin 13 4 13 0 - 17 7 g/dL    HCT 41 8 37 5 - 51 0 %    MCV 92 79 - 97 fL    MCH 29 6 26 6 - 33 0 pg    MCHC 32 1 31 5 - 35 7 g/dL    RDW 13 0 11 6 - 15 4 %    Platelet Count 118 412 - 450 x10E3/uL    Neutrophils 58 Not Estab  %    Lymphocytes 30 Not Estab  %    Monocytes 8 Not Estab  %    Eosinophils 3 Not Estab  %    Basophils PCT 1 Not Estab  %    Neutrophils (Absolute) 3 6 1 4 - 7 0 x10E3/uL    Lymphocytes (Absolute) 1 9 0 7 - 3 1 x10E3/uL    Monocytes (Absolute) 0 5 0 1 - 0 9 x10E3/uL    Eosinophils (Absolute) 0 2 0 0 - 0 4 x10E3/uL    Basophils ABS 0 0 0 0 - 0 2 x10E3/uL    Immature Granulocytes 0 Not Estab  %    Immature Granulocytes (Absolute) 0 0 0 0 - 0 1 x10E3/uL   Comprehensive metabolic panel    Collection Time: 03/18/22  9:03 AM   Result Value Ref Range    Glucose, Random 98 65 - 99 mg/dL    BUN 9 6 - 24 mg/dL    Creatinine 0 85 0 76 - 1 27 mg/dL eGFR 101 >59 mL/min/1 73    SL AMB BUN/CREATININE RATIO 11 9 - 20    Sodium 143 134 - 144 mmol/L    Potassium 4 4 3 5 - 5 2 mmol/L    Chloride 106 96 - 106 mmol/L    CO2 23 20 - 29 mmol/L    CALCIUM 8 6 (L) 8 7 - 10 2 mg/dL    Protein, Total 6 7 6 0 - 8 5 g/dL    Albumin 4 2 3 8 - 4 9 g/dL    Globulin, Total 2 5 1 5 - 4 5 g/dL    Albumin/Globulin Ratio 1 7 1 2 - 2 2    TOTAL BILIRUBIN 0 3 0 0 - 1 2 mg/dL    Alk Phos Isoenzymes 74 44 - 121 IU/L    AST 28 0 - 40 IU/L    ALT 34 0 - 44 IU/L   Lipid panel    Collection Time: 03/18/22  9:03 AM   Result Value Ref Range    Cholesterol, Total 106 100 - 199 mg/dL    Triglycerides 42 0 - 149 mg/dL    HDL 45 >39 mg/dL    LDL Calculated 50 0 - 99 mg/dL   Hemoglobin A1c (w/out EAG)    Collection Time: 03/18/22  9:03 AM   Result Value Ref Range    Hemoglobin A1C 6 0 (H) 4 8 - 5 6 %       This note was created with voice recognition software  Phonic, grammatical and spelling errors may be present within the note as a result

## 2022-05-13 ENCOUNTER — HOSPITAL ENCOUNTER (OUTPATIENT)
Dept: CT IMAGING | Facility: HOSPITAL | Age: 57
Discharge: HOME | End: 2022-05-13
Attending: STUDENT IN AN ORGANIZED HEALTH CARE EDUCATION/TRAINING PROGRAM
Payer: COMMERCIAL

## 2022-05-13 DIAGNOSIS — K42.0 RECURRENT UMBILICAL HERNIA WITH INCARCERATION: ICD-10-CM

## 2022-05-13 PROCEDURE — 74176 CT ABD & PELVIS W/O CONTRAST: CPT

## 2022-05-17 ENCOUNTER — HOSPITAL ENCOUNTER (OUTPATIENT)
Dept: CT IMAGING | Facility: HOSPITAL | Age: 57
Discharge: HOME | End: 2022-05-17

## 2022-05-17 DIAGNOSIS — K42.0 RECURRENT UMBILICAL HERNIA WITH INCARCERATION: ICD-10-CM

## 2022-05-20 ENCOUNTER — OFFICE VISIT (OUTPATIENT)
Dept: SURGERY | Facility: CLINIC | Age: 57
End: 2022-05-20
Payer: COMMERCIAL

## 2022-05-20 VITALS
OXYGEN SATURATION: 96 % | HEART RATE: 73 BPM | SYSTOLIC BLOOD PRESSURE: 130 MMHG | HEIGHT: 72 IN | DIASTOLIC BLOOD PRESSURE: 82 MMHG | BODY MASS INDEX: 42.53 KG/M2 | WEIGHT: 314 LBS

## 2022-05-20 DIAGNOSIS — K42.9 RECURRENT UMBILICAL HERNIA: Primary | ICD-10-CM

## 2022-05-20 PROCEDURE — 3079F DIAST BP 80-89 MM HG: CPT | Performed by: STUDENT IN AN ORGANIZED HEALTH CARE EDUCATION/TRAINING PROGRAM

## 2022-05-20 PROCEDURE — 3008F BODY MASS INDEX DOCD: CPT | Performed by: STUDENT IN AN ORGANIZED HEALTH CARE EDUCATION/TRAINING PROGRAM

## 2022-05-20 PROCEDURE — 99213 OFFICE O/P EST LOW 20 MIN: CPT | Performed by: STUDENT IN AN ORGANIZED HEALTH CARE EDUCATION/TRAINING PROGRAM

## 2022-05-20 PROCEDURE — 3075F SYST BP GE 130 - 139MM HG: CPT | Performed by: STUDENT IN AN ORGANIZED HEALTH CARE EDUCATION/TRAINING PROGRAM

## 2022-05-20 NOTE — PROGRESS NOTES
Assessment/Plan:  60year old male with recurrent umbilical hernia  -patient is tolerating a diet well and having normal bowel function  -he still does have some intermittent discomfort at the hernia site  -CT scan of the abdomen pelvis from 05/13/2022 report and images reviewed, this was also reviewed with the patient  -patient's hernia is fat containing in seems that his prior hernia mesh has been displaced  -discussed with patient that to give him the best outcome and reduce risk of recurrence he would need to lose weight per our prior discussion  -patient agreed and is actively working on weight loss  -patient to call office for follow-up after weight loss       1  Recurrent umbilical hernia               Subjective:      Patient ID: Joseluis Multani is a 59 y o  male  Triage Notes:    Patient is a 68-year-old male who presents to office for evaluation for recurrent umbilical hernia  He is tolerating a diet well and having normal bowel function  He still has some intermittent discomfort at the hernia site  The following portions of the patient's history were reviewed and updated as appropriate: allergies, current medications, past family history, past medical history, past social history, past surgical history and problem list     Review of Systems   Constitutional: Negative for chills, fatigue and fever  HENT: Negative for congestion, hearing loss, rhinorrhea and sore throat  Eyes: Negative for pain and discharge  Respiratory: Negative for cough, chest tightness and shortness of breath  Cardiovascular: Negative for chest pain and palpitations  Gastrointestinal: Negative for abdominal pain, constipation, diarrhea, nausea and vomiting  Positive umbilical hernia   Endocrine: Negative for cold intolerance and heat intolerance  Genitourinary: Negative for difficulty urinating and dysuria  Musculoskeletal: Negative for back pain and neck pain     Skin: Negative for color change and rash    Allergic/Immunologic: Negative for environmental allergies and food allergies  Neurological: Negative for seizures and headaches  Hematological: Negative for adenopathy  Does not bruise/bleed easily  Psychiatric/Behavioral: Negative for confusion and hallucinations  Objective:      /82   Pulse 73   Ht 6' (1 829 m)   Wt (!) 142 kg (314 lb)   SpO2 96%   BMI 42 59 kg/m²     Below is the patient's most recent value for Albumin, ALT, AST, BUN, Calcium, Chloride, Cholesterol, CO2, Creatinine, GFR, Glucose, HDL, Hematocrit, Hemoglobin, Hemoglobin A1C, LDL, Magnesium, Phosphorus, Platelets, Potassium, PSA, Sodium, Triglycerides, and WBC  Lab Results   Component Value Date    ALT 34 03/18/2022    AST 28 03/18/2022    BUN 9 03/18/2022    CALCIUM 8 2 (L) 06/19/2020     03/18/2022    CHOL 155 09/26/2017    CO2 23 03/18/2022    CREATININE 0 85 03/18/2022    HDL 45 03/18/2022    HCT 41 8 03/18/2022    HGB 13 4 03/18/2022    HGBA1C 6 0 (H) 03/18/2022    MG 1 9 06/19/2020     03/18/2022    K 4 4 03/18/2022    PSA 0 3 02/19/2021     09/26/2017    TRIG 42 03/18/2022    WBC 6 2 03/18/2022     Note: for a comprehensive list of the patient's lab results, access the Results Review activity  Physical Exam  Constitutional:       Appearance: Normal appearance  He is obese  HENT:      Head: Normocephalic and atraumatic  Nose: Nose normal    Eyes:      General: No scleral icterus  Conjunctiva/sclera: Conjunctivae normal    Cardiovascular:      Rate and Rhythm: Normal rate  Pulmonary:      Effort: Pulmonary effort is normal    Musculoskeletal:         General: No signs of injury  Skin:     General: Skin is warm  Coloration: Skin is not jaundiced  Neurological:      General: No focal deficit present  Mental Status: He is alert and oriented to person, place, and time     Psychiatric:         Mood and Affect: Mood normal          Behavior: Behavior normal

## 2022-06-10 ENCOUNTER — TELEPHONE (OUTPATIENT)
Dept: BARIATRICS | Facility: CLINIC | Age: 57
End: 2022-06-10

## 2022-06-10 NOTE — TELEPHONE ENCOUNTER
Received call from Pt re: today's appointment  Pt explained that he was unable to leave work and needed to reschedule  Pt stated he would be able to come during the day but needs a work note for documentation  Rescheduled Pt for 6/13/2022 for his overdue annual with [facility]  Pt was agreeable to this plan

## 2022-06-13 ENCOUNTER — OFFICE VISIT (OUTPATIENT)
Dept: BARIATRICS | Facility: CLINIC | Age: 57
End: 2022-06-13
Payer: COMMERCIAL

## 2022-06-13 VITALS
SYSTOLIC BLOOD PRESSURE: 140 MMHG | HEART RATE: 58 BPM | TEMPERATURE: 97.4 F | BODY MASS INDEX: 41.75 KG/M2 | DIASTOLIC BLOOD PRESSURE: 80 MMHG | WEIGHT: 315 LBS | HEIGHT: 73 IN

## 2022-06-13 DIAGNOSIS — Z48.815 ENCOUNTER FOR SURGICAL AFTERCARE FOLLOWING SURGERY OF DIGESTIVE SYSTEM: Primary | ICD-10-CM

## 2022-06-13 DIAGNOSIS — Z98.84 WEIGHT GAIN FOLLOWING GASTRIC BYPASS SURGERY: ICD-10-CM

## 2022-06-13 DIAGNOSIS — Z98.84 BARIATRIC SURGERY STATUS: ICD-10-CM

## 2022-06-13 DIAGNOSIS — R63.5 WEIGHT GAIN FOLLOWING GASTRIC BYPASS SURGERY: ICD-10-CM

## 2022-06-13 DIAGNOSIS — R63.5 ABNORMAL WEIGHT GAIN: ICD-10-CM

## 2022-06-13 DIAGNOSIS — K91.2 POSTSURGICAL MALABSORPTION: ICD-10-CM

## 2022-06-13 DIAGNOSIS — E66.01 OBESITY, CLASS III, BMI 40-49.9 (MORBID OBESITY) (HCC): ICD-10-CM

## 2022-06-13 PROCEDURE — 3077F SYST BP >= 140 MM HG: CPT | Performed by: NURSE PRACTITIONER

## 2022-06-13 PROCEDURE — 99213 OFFICE O/P EST LOW 20 MIN: CPT | Performed by: NURSE PRACTITIONER

## 2022-06-13 PROCEDURE — 3008F BODY MASS INDEX DOCD: CPT | Performed by: NURSE PRACTITIONER

## 2022-06-13 PROCEDURE — 3079F DIAST BP 80-89 MM HG: CPT | Performed by: NURSE PRACTITIONER

## 2022-06-13 NOTE — PROGRESS NOTES
Assessment/Plan:     Patient ID: Sigrid Cobos is a 59 y o  male  Bariatric Surgery Status    -s/p Shae-En-Y Gastric Bypass with Dr Carlos Manuel Pires on 05/03/2011  Presents to the office today for OD annual  Lost to follow up due to working a lot  Patient reports he is concern with weight gain  States he continues to get his dumping syndrome and triggers to tell him to stop eating  He reports he does have cravings and binges  He does not exercise  Intermittently follows 30/60 minute rule  Denies having any N/V/D/C, regurgitation, reflux or dysphagia  Interested in medications to help with weight lost      Of note, reports he has a hernia which is present in the past  But has to be at goal weight in order to have surgery  Needs to be at least 270 lbs in order to have hernia repair  Has discomfort with abdominal straining  PLAN:     - Follow up with [facility] for consult to help with weight lost  He would like to follow up with the [place] site  Advised patient to continue here for his surgical annual    - ER precautions provided for patient for hernia  Needs gen surg consult/referral when he meets criteria for his umbilical hernia  - Routine follow up in 1 year for annual visit  - Continue with healthy lifestyle, adequate protein intake of 60 gm, fluid intake of at least 64 oz  Increase activity level, continue 30/60 minute rule  Sidney binder provided to patient as a resource  - Continue with MVI daily  - Activity as tolerated  - Labs ordered and will adjust accordingly if any deficiency  - Follow up with RD and SW as needed  · Continued/Maintain healthy weight loss with good nutrition intakes  · Adequate hydration with at least 64oz  fluid intake  · Follow diet as discussed  · Follow vitamin and mineral recommendations as reviewed with you  · Exercise as tolerated  · Colonoscopy referral made: UTD    · Follow-up in 1 year   We kindly ask that your arrive 15 minutes before your scheduled appointment time with your provider to allow our staff to room you, get your vital signs and update your chart  · Get lab work done  Please call the office if you need a script  It is recommended to check with your insurance BEFORE getting labs done to make sure they are covered by your policy  · Call our office if you have any problems with abdominal pain especially associated with fever, chills, nausea, vomiting or any other concerns  · All  Post-bariatric surgery patients should be aware that very small quantities of any alcohol can cause impairment and it is very possible not to feel the effect  The effect can be in the system for several hours  It is also a stomach irritant  · It is advised to AVOID alcohol, Nonsteroidal antiinflammatory drugs (NSAIDS) and nicotine of all forms   Any of these can cause stomach irritation/pain  · Discussed the effects of alcohol on a bariatric patient and the increased impairment risk  · Keep up the good work! Postsurgical Malabsorption   -At risk for malabsorption of vitamins/minerals secondary to malabsorption and restriction of intake from bariatric surgery  - Currently taking adequate postop bariatric surgery vitamin supplementation  -Next set of bariatric labs ordered for approximately 2 weeks  -Patient received education about the importance of adhering to a lifelong supplementation regimen to avoid vitamin/mineral deficiencies      Diagnoses and all orders for this visit:    Encounter for surgical aftercare following surgery of digestive system  -     Cancel: Zinc; Future  -     Cancel: Vitamin B1, whole blood; Future  -     Cancel: Vitamin A; Future  -     Cancel: Vitamin B12; Future  -     Cancel: PTH, intact; Future  -     Cancel: Iron Saturation %; Future  -     Cancel: Folate; Future  -     Cancel: Ferritin; Future  -     Ferritin; Future  -     Folate; Future  -     Iron Saturation %; Future  -     PTH, intact;  Future  -     Vitamin A; Future  -     Vitamin B1, whole blood; Future  -     Vitamin B12; Future  -     Zinc; Future  -     Ferritin  -     Folate  -     PTH, intact  -     Vitamin A  -     Vitamin B1, whole blood  -     Vitamin B12  -     Zinc    Obesity, Class III, BMI 40-49 9 (morbid obesity) [facility]  -     Ambulatory Referral to Weight Management  -     Cancel: Zinc; Future  -     Cancel: Vitamin B1, whole blood; Future  -     Cancel: Vitamin A; Future  -     Cancel: Vitamin B12; Future  -     Cancel: PTH, intact; Future  -     Cancel: Iron Saturation %; Future  -     Cancel: Folate; Future  -     Cancel: Ferritin; Future  -     Ferritin; Future  -     Folate; Future  -     Iron Saturation %; Future  -     PTH, intact; Future  -     Vitamin A; Future  -     Vitamin B1, whole blood; Future  -     Vitamin B12; Future  -     Zinc; Future  -     Ferritin  -     Folate  -     PTH, intact  -     Vitamin A  -     Vitamin B1, whole blood  -     Vitamin B12  -     Zinc    Weight gain following gastric bypass surgery  -     Ambulatory Referral to Weight Management    Bariatric surgery status  -     Cancel: Zinc; Future  -     Cancel: Vitamin B1, whole blood; Future  -     Cancel: Vitamin A; Future  -     Cancel: Vitamin B12; Future  -     Cancel: PTH, intact; Future  -     Cancel: Iron Saturation %; Future  -     Cancel: Folate; Future  -     Cancel: Ferritin; Future  -     Ferritin; Future  -     Folate; Future  -     Iron Saturation %; Future  -     PTH, intact; Future  -     Vitamin A; Future  -     Vitamin B1, whole blood; Future  -     Vitamin B12; Future  -     Zinc; Future  -     Ferritin  -     Folate  -     PTH, intact  -     Vitamin A  -     Vitamin B1, whole blood  -     Vitamin B12  -     Zinc    Postsurgical malabsorption  -     Cancel: Zinc; Future  -     Cancel: Vitamin B1, whole blood; Future  -     Cancel: Vitamin A; Future  -     Cancel: Vitamin B12; Future  -     Cancel: PTH, intact;  Future  -     Cancel: Iron Saturation %; Future  -     Cancel: Folate; Future  -     Cancel: Ferritin; Future  -     Ferritin; Future  -     Folate; Future  -     Iron Saturation %; Future  -     PTH, intact; Future  -     Vitamin A; Future  -     Vitamin B1, whole blood; Future  -     Vitamin B12; Future  -     Zinc; Future  -     Ferritin  -     Folate  -     PTH, intact  -     Vitamin A  -     Vitamin B1, whole blood  -     Vitamin B12  -     Zinc    Abnormal weight gain  -     Cancel: Zinc; Future  -     Cancel: Vitamin B1, whole blood; Future  -     Cancel: Vitamin A; Future  -     Cancel: Vitamin B12; Future  -     Cancel: PTH, intact; Future  -     Cancel: Iron Saturation %; Future  -     Cancel: Folate; Future  -     Cancel: Ferritin; Future  -     Ferritin; Future  -     Folate; Future  -     Iron Saturation %; Future  -     PTH, intact; Future  -     Vitamin A; Future  -     Vitamin B1, whole blood; Future  -     Vitamin B12; Future  -     Zinc; Future  -     Ferritin  -     Folate  -     PTH, intact  -     Vitamin A  -     Vitamin B1, whole blood  -     Vitamin B12  -     Zinc         Subjective:      Patient ID: Derrick Au is a 59 y o  male  -s/p Shae-En-Y Gastric Bypass with Dr Houston Euceda on 05/03/2011  Presents to the office today for OD annual  Lost to follow up due to working a lot  Patient reports he is concern with weight gain  States he continues to get his dumping syndrome and triggers to tell him to stop eating  He reports he does have cravings and binges  He does not exercise  Intermittently follows 30/60 minute rule  Denies having any N/V/D/C, regurgitation, reflux or dysphagia  Interested in medications to help with weight lost      Of note, reports he has a hernia which is present in the past  But has to be at goal weight in order to have surgery  Needs to be at least 270 lbs in order to have hernia repair  Has discomfort with abdominal straining       Initial: 450 lbs  Current: 318 5 lbs  EWL: (Weight loss is ahead of schedule at this post surgical period )  [place]: 267 lbs  Current BMI is Body mass index is 42 6 kg/m²  · Tolerating a regular diet-yes  · Eating at least 60 grams of protein per day-yes  · Following 30/60 minute rule with liquids-occasionally drinks soon after meals - advised to start again  · Drinking at least 64 ounces of fluid per day-yes  · Drinking carbonated beverages-no  · Sufficient exercise-no   · Using NSAIDs regularly-no  · Using nicotine-no  · Using alcohol-social   · Supplements: B12, christian MVI, calcium     · EWL is 43%, which places the patient ahead of schedule for expected post surgical weight loss at this time  The following portions of the patient's history were reviewed and updated as appropriate: allergies, current medications, past family history, past medical history, past social history, past surgical history and problem list     Review of Systems   Constitutional: Positive for unexpected weight change (weight gain)  Respiratory: Negative  Cardiovascular: Negative  Gastrointestinal: Positive for abdominal pain (discomfort with hernia)  Negative for constipation, nausea and vomiting  Denies reflux  Musculoskeletal: Negative  Neurological: Negative  Psychiatric/Behavioral: Negative  Objective:    /80 (BP Location: Right arm, Patient Position: Sitting)   Pulse 58   Temp (!) 97 4 °F (36 3 °C) (Tympanic)   Ht 6' 0 5" (1 842 m)   Wt (!) 144 kg (318 lb 8 oz)   BMI 42 60 kg/m²      Physical Exam  Vitals and nursing note reviewed  Constitutional:       Appearance: Normal appearance  He is obese  Cardiovascular:      Rate and Rhythm: Normal rate and regular rhythm  Pulses: Normal pulses  Heart sounds: Normal heart sounds  Pulmonary:      Effort: Pulmonary effort is normal       Breath sounds: Normal breath sounds  Abdominal:      General: Bowel sounds are normal       Palpations: Abdomen is soft  Tenderness:  There is no abdominal tenderness  Hernia: A hernia (umbilical hernia) is present  Musculoskeletal:         General: Normal range of motion  Skin:     General: Skin is warm and dry  Neurological:      General: No focal deficit present  Mental Status: He is alert and oriented to person, place, and time  Mental status is at baseline  Psychiatric:         Mood and Affect: Mood normal          Behavior: Behavior normal          Thought Content:  Thought content normal          Judgment: Judgment normal

## 2022-06-20 DIAGNOSIS — Z98.84 BARIATRIC SURGERY STATUS: Primary | ICD-10-CM

## 2022-06-20 DIAGNOSIS — R79.0 LOW SERUM FERRITIN LEVEL: ICD-10-CM

## 2022-06-20 DIAGNOSIS — K91.2 POSTSURGICAL MALABSORPTION: ICD-10-CM

## 2022-06-20 DIAGNOSIS — R79.89 HIGH SERUM PARATHYROID HORMONE (PTH): ICD-10-CM

## 2022-06-20 DIAGNOSIS — E53.8 VITAMIN B12 DEFICIENCY: ICD-10-CM

## 2022-06-20 LAB
FERRITIN SERPL-MCNC: 18 NG/ML (ref 30–400)
FOLATE SERPL-MCNC: 6.9 NG/ML
PTH-INTACT SERPL-MCNC: 71 PG/ML (ref 15–65)
VIT A SERPL-MCNC: 22.8 UG/DL (ref 20.1–62)
VIT B1 BLD-SCNC: 105.6 NMOL/L (ref 66.5–200)
VIT B12 SERPL-MCNC: 377 PG/ML (ref 232–1245)
ZINC SERPL-MCNC: 63 UG/DL (ref 44–115)

## 2022-07-09 LAB
LEFT EYE DIABETIC RETINOPATHY: NORMAL
RIGHT EYE DIABETIC RETINOPATHY: NORMAL

## 2022-07-09 PROCEDURE — 2022F DILAT RTA XM EVC RTNOPTHY: CPT | Performed by: NURSE PRACTITIONER

## 2022-07-25 ENCOUNTER — TELEPHONE (OUTPATIENT)
Dept: ADMINISTRATIVE | Facility: OTHER | Age: 57
End: 2022-07-25

## 2022-07-25 NOTE — TELEPHONE ENCOUNTER
----- Message from Juliana Jimenez sent at 7/25/2022  9:29 AM EDT -----  Regarding: dm eye  07/25/22 9:29 AM    Hello, our patient attached above has had Diabetic Eye Exam completed/performed  Please assist in updating the patient chart by pulling the document from the Media Tab  The date of service is 7/9/22       Thank you,  Harmony Ayala MA  [facility]

## 2022-07-25 NOTE — TELEPHONE ENCOUNTER
Upon review of the In Basket request we were able to locate, review, and update the patient chart as requested for Diabetic Eye Exam     Any additional questions or concerns should be emailed to the Practice Liaisons via [email or web address] email, please do not reply via In Basket      Thank you  Khari Whitlock MA

## 2022-08-25 DIAGNOSIS — E55.9 VITAMIN D DEFICIENCY: ICD-10-CM

## 2022-08-25 DIAGNOSIS — I10 PRIMARY HYPERTENSION: ICD-10-CM

## 2022-08-25 RX ORDER — ERGOCALCIFEROL 1.25 MG/1
50000 CAPSULE ORAL WEEKLY
Qty: 4 CAPSULE | Refills: 2 | Status: CANCELLED | OUTPATIENT
Start: 2022-08-25 | End: 2022-11-11

## 2022-08-25 RX ORDER — LISINOPRIL 20 MG/1
20 TABLET ORAL DAILY
Qty: 30 TABLET | Refills: 3 | Status: SHIPPED | OUTPATIENT
Start: 2022-08-25 | End: 2022-10-21

## 2022-09-23 DIAGNOSIS — R73.03 PREDIABETES: Primary | ICD-10-CM

## 2022-09-24 LAB
ALBUMIN SERPL-MCNC: 4 G/DL (ref 3.8–4.9)
ALBUMIN/GLOB SERPL: 1.7 {RATIO} (ref 1.2–2.2)
ALP SERPL-CCNC: 72 IU/L (ref 44–121)
ALT SERPL-CCNC: 23 IU/L (ref 0–44)
AST SERPL-CCNC: 14 IU/L (ref 0–40)
BASOPHILS # BLD AUTO: 0 X10E3/UL (ref 0–0.2)
BASOPHILS NFR BLD AUTO: 1 %
BILIRUB SERPL-MCNC: 0.3 MG/DL (ref 0–1.2)
BUN SERPL-MCNC: 11 MG/DL (ref 6–24)
BUN/CREAT SERPL: 16 (ref 9–20)
CALCIUM SERPL-MCNC: 8.6 MG/DL (ref 8.7–10.2)
CHLORIDE SERPL-SCNC: 106 MMOL/L (ref 96–106)
CHOLEST SERPL-MCNC: 153 MG/DL (ref 100–199)
CO2 SERPL-SCNC: 19 MMOL/L (ref 20–29)
CREAT SERPL-MCNC: 0.7 MG/DL (ref 0.76–1.27)
EGFR: 106 ML/MIN/1.73
EOSINOPHIL # BLD AUTO: 0.2 X10E3/UL (ref 0–0.4)
EOSINOPHIL NFR BLD AUTO: 3 %
ERYTHROCYTE [DISTWIDTH] IN BLOOD BY AUTOMATED COUNT: 12.4 % (ref 11.6–15.4)
GLOBULIN SER-MCNC: 2.3 G/DL (ref 1.5–4.5)
GLUCOSE SERPL-MCNC: 104 MG/DL (ref 65–99)
HBA1C MFR BLD: 6.1 % (ref 4.8–5.6)
HCT VFR BLD AUTO: 40.9 % (ref 37.5–51)
HDLC SERPL-MCNC: 52 MG/DL
HGB BLD-MCNC: 13.3 G/DL (ref 13–17.7)
IMM GRANULOCYTES # BLD: 0 X10E3/UL (ref 0–0.1)
IMM GRANULOCYTES NFR BLD: 0 %
LDLC SERPL CALC-MCNC: 90 MG/DL (ref 0–99)
LYMPHOCYTES # BLD AUTO: 1.6 X10E3/UL (ref 0.7–3.1)
LYMPHOCYTES NFR BLD AUTO: 35 %
MCH RBC QN AUTO: 29.8 PG (ref 26.6–33)
MCHC RBC AUTO-ENTMCNC: 32.5 G/DL (ref 31.5–35.7)
MCV RBC AUTO: 92 FL (ref 79–97)
MONOCYTES # BLD AUTO: 0.4 X10E3/UL (ref 0.1–0.9)
MONOCYTES NFR BLD AUTO: 9 %
NEUTROPHILS # BLD AUTO: 2.4 X10E3/UL (ref 1.4–7)
NEUTROPHILS NFR BLD AUTO: 52 %
PLATELET # BLD AUTO: 240 X10E3/UL (ref 150–450)
POTASSIUM SERPL-SCNC: 4.1 MMOL/L (ref 3.5–5.2)
PROT SERPL-MCNC: 6.3 G/DL (ref 6–8.5)
RBC # BLD AUTO: 4.47 X10E6/UL (ref 4.14–5.8)
SODIUM SERPL-SCNC: 142 MMOL/L (ref 134–144)
TRIGL SERPL-MCNC: 50 MG/DL (ref 0–149)
WBC # BLD AUTO: 4.7 X10E3/UL (ref 3.4–10.8)

## 2022-09-24 PROCEDURE — 3044F HG A1C LEVEL LT 7.0%: CPT | Performed by: PHYSICIAN ASSISTANT

## 2022-09-26 NOTE — ASSESSMENT & PLAN NOTE
-s/p Shae-En-Y Gastric Bypass with Dr Jerome Olsen on 05/03/2011  He is down 23lbs in last 10months and would like to continue progress  Recommend he avoid meal skipping and avoid grazing/snacking  Avoid refined CHO  He wishes to consult with MWM for additional support  Initial: 418 5lbs  Current: 313 9lbs  EWL: 45%  [place]: 267lbs  Current BMI is Body mass index is 41 99 kg/m²  · Tolerating a regular diet-yes  · Eating at least 60 grams of protein per day-no d/t meal skipping  · Following 30/60 minute rule with liquids-yes  · Drinking at least 64 ounces of fluid per day-yes  · Drinking carbonated beverages-no  · Sufficient exercise-no; encouraged him to start walking program  · Using NSAIDs regularly-no  · Using nicotine-no  · Using alcohol-no   Advised about the risks of alcohol s/p bariatric surgery and recommend avoiding all alcohol

## 2022-09-30 ENCOUNTER — OFFICE VISIT (OUTPATIENT)
Dept: BARIATRICS | Facility: CLINIC | Age: 57
End: 2022-09-30
Payer: COMMERCIAL

## 2022-09-30 VITALS
DIASTOLIC BLOOD PRESSURE: 78 MMHG | HEART RATE: 59 BPM | SYSTOLIC BLOOD PRESSURE: 132 MMHG | HEIGHT: 73 IN | WEIGHT: 313.9 LBS | RESPIRATION RATE: 18 BRPM | BODY MASS INDEX: 41.6 KG/M2

## 2022-09-30 DIAGNOSIS — Z90.3 POSTGASTRECTOMY MALABSORPTION: ICD-10-CM

## 2022-09-30 DIAGNOSIS — K91.2 POSTGASTRECTOMY MALABSORPTION: ICD-10-CM

## 2022-09-30 DIAGNOSIS — E11.21 CONTROLLED TYPE 2 DIABETES MELLITUS WITH DIABETIC NEPHROPATHY, WITHOUT LONG-TERM CURRENT USE OF INSULIN (HCC): ICD-10-CM

## 2022-09-30 DIAGNOSIS — Z48.815 ENCOUNTER FOR SURGICAL AFTERCARE FOLLOWING SURGERY OF DIGESTIVE SYSTEM: Primary | ICD-10-CM

## 2022-09-30 PROCEDURE — 3075F SYST BP GE 130 - 139MM HG: CPT | Performed by: PHYSICIAN ASSISTANT

## 2022-09-30 PROCEDURE — 3078F DIAST BP <80 MM HG: CPT | Performed by: PHYSICIAN ASSISTANT

## 2022-09-30 PROCEDURE — 99214 OFFICE O/P EST MOD 30 MIN: CPT | Performed by: PHYSICIAN ASSISTANT

## 2022-09-30 PROCEDURE — 3066F NEPHROPATHY DOC TX: CPT | Performed by: PHYSICIAN ASSISTANT

## 2022-09-30 NOTE — PROGRESS NOTES
Assessment/Plan:    Encounter for surgical aftercare following surgery of digestive system  -s/p Shae-En-Y Gastric Bypass with Dr Namrata Worthy on 05/03/2011  He is down 23lbs in last 10months and would like to continue progress  Recommend he avoid meal skipping and avoid grazing/snacking  Avoid refined CHO  He wishes to consult with MWM for additional support  Initial: 418 5lbs  Current: 313 9lbs  EWL: 45%  [place]: 267lbs  Current BMI is Body mass index is 41 99 kg/m²  · Tolerating a regular diet-yes  · Eating at least 60 grams of protein per day-no d/t meal skipping  · Following 30/60 minute rule with liquids-yes  · Drinking at least 64 ounces of fluid per day-yes  · Drinking carbonated beverages-no  · Sufficient exercise-no; encouraged him to start walking program  · Using NSAIDs regularly-no  · Using nicotine-no  · Using alcohol-no   Advised about the risks of alcohol s/p bariatric surgery and recommend avoiding all alcohol      Postgastrectomy malabsorption  -At risk for malabsorption of vitamins/minerals secondary to malabsorption and restriction of intake from bariatric surgery  -NOT Currently taking adequate postop bariatric surgery vitamin supplementation: calcium citrate 500mg, B12, MVI    -Advised him to increase calcium citrate to TID, change to Bariatric MVI  -Last set of bariatric labs completed in June and showed elevated PTH, no Vitamin D drawn, ferritin low (18), mildly low B12  -Next set of bariatric labs ordered for approximately 1 month  -Patient received education about the importance of adhering to a lifelong supplementation regimen to avoid vitamin/mineral deficiencies         Controlled type 2 diabetes mellitus with diabetic nephropathy, without long-term current use of insulin ([facility])    Lab Results   Component Value Date    HGBA1C 6 1 (H) 09/23/2022     -Diet controlled; work on healthy diet and exercise changes  -Repeat A1C per PCP       Diagnoses and all orders for this visit:    Encounter for surgical aftercare following surgery of digestive system    Postgastrectomy malabsorption    Controlled type 2 diabetes mellitus with diabetic nephropathy, without long-term current use of insulin ([facility])          Subjective:      Patient ID: Maria De Jesus Cervantes is a 59 y o  male  -s/p Shae-En-Y Gastric Bypass with Dr Greta Hurst on 05/03/2011  He is down about 23lbs since last January  Presents to the office today for routine follow up  He was seen in June in [facility]; lives in the area so wishes to be seen in [place]  Tolerating diet without issues; denies N/V, dysphagia, reflux  Overall doing he is down 23lbs in last 10 months  No exercise  [place] the city - works long hours  Has ventral hernia - Needs to be at least 270 lbs in order to have hernia repair  Has discomfort with abdominal straining  May CTAP:  "1   Again identified is a ventral umbilical fat-containing hernia slightly larger than on the prior exam without evidence for bowel involvement  2   Intra-abdominal curvilinear opacity just deep to the neck of the hernia partially representing scarring versus displaced hernia mesh  The appearance is similar to the prior exam "    Diet Recall:   B - skips or oatmeal or granola bar  L - skips  Grazes/snacks - cashews and orange/tangerine  D (7:30pm) - spaghetti or pork chops or turkey and steamed veggies    Fluids - 64oz water     The following portions of the patient's history were reviewed and updated as appropriate: allergies, current medications, past family history, past medical history, past social history, past surgical history and problem list     Review of Systems   Constitutional: Negative for chills and fever  Unexpected weight change: planned weight loss  HENT: Negative for trouble swallowing  Respiratory: Negative for cough and shortness of breath  Cardiovascular: Negative for chest pain and palpitations     Gastrointestinal: Negative for abdominal pain, constipation, diarrhea, nausea and vomiting  Musculoskeletal: Positive for arthralgias  Neurological: Negative for dizziness  Psychiatric/Behavioral:        Denies anxiety and depression         Objective:      /78 (BP Location: Left arm, Patient Position: Sitting, Cuff Size: Large)   Pulse 59   Resp 18   Ht 6' 0 5" (1 842 m)   Wt (!) 142 kg (313 lb 14 4 oz)   BMI 41 99 kg/m²     Colonoscopy-Completed       Physical Exam  Vitals reviewed  Constitutional:       General: He is not in acute distress  Appearance: He is well-developed  HENT:      Head: Normocephalic and atraumatic  Eyes:      General: No scleral icterus  Cardiovascular:      Rate and Rhythm: Normal rate and regular rhythm  Pulmonary:      Effort: Pulmonary effort is normal  No respiratory distress  Abdominal:      General: There is no distension  Palpations: Abdomen is soft  Tenderness: There is no abdominal tenderness  Comments: Umbilical hernia, freely reducible   Skin:     General: Skin is warm and dry  Neurological:      Mental Status: He is alert and oriented to person, place, and time  Psychiatric:         Mood and Affect: Mood normal          Behavior: Behavior normal            BARRIERS: none identified    GOALS:   · Continued/Maintain healthy weight loss with good nutrition intakes  · Adequate hydration with at least 64oz  fluid intake  · Normal vitamin and mineral levels  · Exercise as tolerated  · Increase calcium to three times a day    · Follow-up in 1 year  We kindly ask that your arrive 15 minutes before your scheduled appointment time with your provider to allow our staff to room you, get your vital signs and update your chart  · Follow diet as discussed  · Get lab work done in the next 2 weeks  You have been given a lab slip today  Please call the office if you need a replacement  It is recommended to check with your insurance BEFORE getting labs done to make sure they are covered by your policy  Also, please check with your PCP and other providers before getting labs to avoid duplicate labs  Make sure to HOLD any multivitamins that may contain biotin and any biotin supplements FOR 5 DAYS before any labs since it can affect the results  · Follow vitamin and mineral recommendations as reviewed with you  · Call our office if you have any problems with abdominal pain especially associated with fever, chills, nausea, vomiting or any other concerns  · All  Post-bariatric surgery patients should be aware that very small quantities of any alcohol can cause impairment and it is very possible not to feel the effect  The effect can be in the system for several hours  It is also a stomach irritant  · It is advised to AVOID alcohol, Nonsteroidal antiinflammatory drugs (NSAIDS) and nicotine of all forms   Any of these can cause stomach irritation/pain

## 2022-09-30 NOTE — ASSESSMENT & PLAN NOTE
Lab Results   Component Value Date    HGBA1C 6 1 (H) 09/23/2022     -Diet controlled; work on healthy diet and exercise changes  -Repeat A1C per PCP

## 2022-09-30 NOTE — ASSESSMENT & PLAN NOTE
-At risk for malabsorption of vitamins/minerals secondary to malabsorption and restriction of intake from bariatric surgery  -NOT Currently taking adequate postop bariatric surgery vitamin supplementation: calcium citrate 500mg, B12, MVI    -Advised him to increase calcium citrate to TID, change to Bariatric MVI  -Last set of bariatric labs completed in June and showed elevated PTH, no Vitamin D drawn, ferritin low (18), mildly low B12  -Next set of bariatric labs ordered for approximately 1 month  -Patient received education about the importance of adhering to a lifelong supplementation regimen to avoid vitamin/mineral deficiencies

## 2022-10-21 ENCOUNTER — OFFICE VISIT (OUTPATIENT)
Dept: INTERNAL MEDICINE CLINIC | Facility: CLINIC | Age: 57
End: 2022-10-21
Payer: COMMERCIAL

## 2022-10-21 VITALS
BODY MASS INDEX: 41.75 KG/M2 | DIASTOLIC BLOOD PRESSURE: 80 MMHG | RESPIRATION RATE: 16 BRPM | OXYGEN SATURATION: 97 % | HEART RATE: 58 BPM | HEIGHT: 73 IN | WEIGHT: 315 LBS | SYSTOLIC BLOOD PRESSURE: 132 MMHG

## 2022-10-21 DIAGNOSIS — Z90.3 POSTGASTRECTOMY MALABSORPTION: ICD-10-CM

## 2022-10-21 DIAGNOSIS — E55.9 VITAMIN D DEFICIENCY: ICD-10-CM

## 2022-10-21 DIAGNOSIS — I10 PRIMARY HYPERTENSION: ICD-10-CM

## 2022-10-21 DIAGNOSIS — Z12.5 PROSTATE CANCER SCREENING: ICD-10-CM

## 2022-10-21 DIAGNOSIS — E66.01 CLASS 3 SEVERE OBESITY DUE TO EXCESS CALORIES WITH SERIOUS COMORBIDITY AND BODY MASS INDEX (BMI) OF 40.0 TO 44.9 IN ADULT (HCC): ICD-10-CM

## 2022-10-21 DIAGNOSIS — K91.2 POSTGASTRECTOMY MALABSORPTION: ICD-10-CM

## 2022-10-21 DIAGNOSIS — R73.03 PREDIABETES: Primary | ICD-10-CM

## 2022-10-21 PROCEDURE — 99214 OFFICE O/P EST MOD 30 MIN: CPT | Performed by: INTERNAL MEDICINE

## 2022-10-21 RX ORDER — FERROUS SULFATE TAB EC 324 MG (65 MG FE EQUIVALENT) 324 (65 FE) MG
324 TABLET DELAYED RESPONSE ORAL
Qty: 90 TABLET | Refills: 2 | Status: SHIPPED | OUTPATIENT
Start: 2022-10-21

## 2022-10-21 RX ORDER — ONDANSETRON 4 MG/1
4 TABLET, ORALLY DISINTEGRATING ORAL EVERY 8 HOURS PRN
COMMUNITY
Start: 2022-08-17

## 2022-10-21 RX ORDER — ERGOCALCIFEROL 1.25 MG/1
50000 CAPSULE ORAL WEEKLY
Qty: 4 CAPSULE | Refills: 2 | Status: SHIPPED | OUTPATIENT
Start: 2022-10-21 | End: 2023-01-07

## 2022-10-21 NOTE — PROGRESS NOTES
Assessment/Plan:     Patient is here for follow-up  Reports doing well  Following bariatric  Interested in weight loss medication  Will trial wegovy; Otherwise denies any major complaints  Quality Measures:     BMI Counseling: Body mass index is 43 04 kg/m²  The BMI is above normal  Nutrition recommendations include decreasing portion sizes and encouraging healthy choices of fruits and vegetables  Exercise recommendations include moderate physical activity 150 minutes/week  Rationale for BMI follow-up plan is due to patient being overweight or obese  Depression Screening and Follow-up Plan: Patient was screened for depression during today's encounter  They screened negative with a PHQ-2 score of 0  Return in about 4 weeks (around 11/18/2022)  No problem-specific Assessment & Plan notes found for this encounter  Diagnoses and all orders for this visit:    Prediabetes  -     Hemoglobin A1C; Future  -     Microalbumin / creatinine urine ratio    Vitamin D deficiency  -     ergocalciferol (VITAMIN D2) 50,000 units; Take 1 capsule (50,000 Units total) by mouth once a week for 12 doses    Postgastrectomy malabsorption  -     ferrous sulfate 324 (65 Fe) mg; Take 1 tablet (324 mg total) by mouth 2 (two) times a day before meals    Primary hypertension  -     CBC and differential; Future  -     Comprehensive metabolic panel; Future  -     TSH, 3rd generation with Free T4 reflex; Future  -     Lipid Panel with Direct LDL reflex; Future    Prostate cancer screening  -     PSA, total and free; Future    Class 3 severe obesity due to excess calories with serious comorbidity and body mass index (BMI) of 40 0 to 44 9 in adult ([facility] )  -     Semaglutide-Weight Management (WEGOVY) 0 25 MG/0 5ML; Inject 0 5 mL (0 25 mg total) under the skin once a week for 4 doses    Other orders  -     ondansetron (ZOFRAN-ODT) 4 mg disintegrating tablet;  Take 4 mg by mouth every 8 (eight) hours as needed Subjective:      Patient ID: Billy Wood is a 59 y o  male  Here for follow up        ALLERGIES:  Allergies   Allergen Reactions   • Prunus Persica Anaphylaxis     Also frankie, plums, apricot, nectarine       CURRENT MEDICATIONS:    Current Outpatient Medications:   •  atorvastatin (LIPITOR) 40 mg tablet, Take 1 tablet (40 mg total) by mouth daily, Disp: 30 tablet, Rfl: 5  •  CALCIUM CITRATE PO, Take by mouth, Disp: , Rfl:   •  Cholecalciferol (VITAMIN D3) 2000 units capsule, Take 1 tablet by mouth daily, Disp: , Rfl:   •  clobetasol (TEMOVATE) 0 05 % ointment, Apply topically 2 (two) times a day, Disp: 60 g, Rfl: 1  •  Cyanocobalamin (VITAMIN B-12) 5000 MCG SUBL, Place under the tongue, Disp: , Rfl:   •  ergocalciferol (VITAMIN D2) 50,000 units, Take 1 capsule (50,000 Units total) by mouth once a week for 12 doses, Disp: 4 capsule, Rfl: 2  •  ferrous sulfate 324 (65 Fe) mg, Take 1 tablet (324 mg total) by mouth 2 (two) times a day before meals, Disp: 90 tablet, Rfl: 2  •  lisinopril (ZESTRIL) 20 mg tablet, Take 1 tablet (20 mg total) by mouth daily, Disp: 30 tablet, Rfl: 3  •  Multiple Vitamin (MULTI-VITAMIN DAILY) TABS, Take by mouth, Disp: , Rfl:   •  ondansetron (ZOFRAN-ODT) 4 mg disintegrating tablet, Take 4 mg by mouth every 8 (eight) hours as needed, Disp: , Rfl:   •  Semaglutide-Weight Management (WEGOVY) 0 25 MG/0 5ML, Inject 0 5 mL (0 25 mg total) under the skin once a week for 4 doses, Disp: 2 mL, Rfl: 0    Current Facility-Administered Medications:   •  cyanocobalamin injection 1,000 mcg, 1,000 mcg, Intramuscular, Q30 Days, NEAL Lundberg, 1,000 mcg at 04/25/19 1106    ACTIVE PROBLEM LIST:  Patient Active Problem List   Diagnosis   • Balanitis   • Osteoarthritis of first metatarsophalangeal joint   • Penile cyst   • Ventral hernia   • Umbilical hernia   • Vitamin D deficiency   • Weight gain following gastric bypass surgery   • Lichen planus   • Internal hemorrhoid   • Postgastrectomy malabsorption   • Seborrheic keratoses, inflamed   • Vitamin B12 deficiency   • Obesity, Class III, BMI 40-49 9 (morbid obesity) ([facility] )   • At risk for obstructive sleep apnea   • Prediabetes   • Encounter for surgical aftercare following surgery of digestive system   • Educated about COVID-19 virus infection   • Screening for malignant neoplasm of colon   • Screening for malignant neoplasm of prostate   • Controlled type 2 diabetes mellitus with diabetic nephropathy, without long-term current use of insulin ([facility] )       PAST MEDICAL HISTORY:  Past Medical History:   Diagnosis Date   • Arthritis     Last assessed: 12   • History of bariatric surgery    • Morbid obesity due to excess calories ([address] )     Last assessed: 12   • Obesity    • Postsurgical malabsorption    • Type 2 diabetes mellitus ([facility] )     Last assessed: 6/5/15   • Vitamin B1 deficiency     Last assessed: 14   • Vitamin D deficiency     Last assessed: 14       PAST SURGICAL HISTORY:  Past Surgical History:   Procedure Laterality Date   • GASTRIC BYPASS      For Morbid Obesity  Managed by: Emily Osorio   Last assessed: 14   • HERNIA REPAIR         FAMILY HISTORY:  Family History   Problem Relation Age of Onset   • Heart failure Mother         Congestive   • Hypertension Mother    • Heart disease Mother    • Bone cancer Father    • Throat cancer Father         Laryngeal   • Diabetes Neg Hx    • Thyroid disease Neg Hx    • Stroke Neg Hx        SOCIAL HISTORY:  Social History     Socioeconomic History   • Marital status: /Civil Union     Spouse name: Not on file   • Number of children: Not on file   • Years of education: Not on file   • Highest education level: Not on file   Occupational History   • Not on file   Tobacco Use   • Smoking status: Current Some Day Smoker     Last attempt to quit: 2010     Years since quittin 5   • Smokeless tobacco: Never Used   • Tobacco comment: occasionally every couple days Vaping Use   • Vaping Use: Never used   Substance and Sexual Activity   • Alcohol use: Yes     Comment: Social   • Drug use: No   • Sexual activity: Yes     Partners: Female   Other Topics Concern   • Not on file   Social History Narrative   • Not on file     Social Determinants of Health     Financial Resource Strain: Not on file   Food Insecurity: Not on file   Transportation Needs: Not on file   Physical Activity: Not on file   Stress: Not on file   Social Connections: Not on file   Intimate Partner Violence: Not on file   Housing Stability: Not on file       Review of Systems   Gastrointestinal: Positive for constipation  All other systems reviewed and are negative  Objective:  Vitals:    10/21/22 0842 10/21/22 0858   BP: 146/80 132/80   BP Location: Left arm    Patient Position: Sitting    Cuff Size: Adult    Pulse: 58    Resp: 16    SpO2: 97%    Weight: (!) 146 kg (321 lb 12 8 oz)    Height: 6' 0 5" (1 842 m)      Body mass index is 43 04 kg/m²  Physical Exam  Vitals and nursing note reviewed  Constitutional:       Appearance: Normal appearance  He is obese  HENT:      Head: Normocephalic and atraumatic  Cardiovascular:      Rate and Rhythm: Normal rate and regular rhythm  Heart sounds: Normal heart sounds  Pulmonary:      Effort: Pulmonary effort is normal       Breath sounds: Normal breath sounds  Abdominal:      Palpations: Abdomen is soft  Musculoskeletal:         General: Normal range of motion  Cervical back: Normal range of motion  Right lower leg: No edema  Left lower leg: No edema  Lymphadenopathy:      Cervical: No cervical adenopathy  Skin:     General: Skin is warm and dry  Neurological:      General: No focal deficit present  Mental Status: He is alert and oriented to person, place, and time  Mental status is at baseline     Psychiatric:         Mood and Affect: Mood normal          Behavior: Behavior normal            RESULTS:    Recent Results (from the past 1008 hour(s))   CBC and differential    Collection Time: 09/23/22  8:37 AM   Result Value Ref Range    White Blood Cell Count 4 7 3 4 - 10 8 x10E3/uL    Red Blood Cell Count 4 47 4 14 - 5 80 x10E6/uL    Hemoglobin 13 3 13 0 - 17 7 g/dL    HCT 40 9 37 5 - 51 0 %    MCV 92 79 - 97 fL    MCH 29 8 26 6 - 33 0 pg    MCHC 32 5 31 5 - 35 7 g/dL    RDW 12 4 11 6 - 15 4 %    Platelet Count 459 387 - 450 x10E3/uL    Neutrophils 52 Not Estab  %    Lymphocytes 35 Not Estab  %    Monocytes 9 Not Estab  %    Eosinophils 3 Not Estab  %    Basophils PCT 1 Not Estab  %    Neutrophils (Absolute) 2 4 1 4 - 7 0 x10E3/uL    Lymphocytes (Absolute) 1 6 0 7 - 3 1 x10E3/uL    Monocytes (Absolute) 0 4 0 1 - 0 9 x10E3/uL    Eosinophils (Absolute) 0 2 0 0 - 0 4 x10E3/uL    Basophils ABS 0 0 0 0 - 0 2 x10E3/uL    Immature Granulocytes 0 Not Estab  %    Immature Granulocytes (Absolute) 0 0 0 0 - 0 1 x10E3/uL   Comprehensive metabolic panel    Collection Time: 09/23/22  8:37 AM   Result Value Ref Range    Glucose, Random 104 (H) 65 - 99 mg/dL    BUN 11 6 - 24 mg/dL    Creatinine 0 70 (L) 0 76 - 1 27 mg/dL    eGFR 106 >59 mL/min/1 73    SL AMB BUN/CREATININE RATIO 16 9 - 20    Sodium 142 134 - 144 mmol/L    Potassium 4 1 3 5 - 5 2 mmol/L    Chloride 106 96 - 106 mmol/L    CO2 19 (L) 20 - 29 mmol/L    CALCIUM 8 6 (L) 8 7 - 10 2 mg/dL    Protein, Total 6 3 6 0 - 8 5 g/dL    Albumin 4 0 3 8 - 4 9 g/dL    Globulin, Total 2 3 1 5 - 4 5 g/dL    Albumin/Globulin Ratio 1 7 1 2 - 2 2    TOTAL BILIRUBIN 0 3 0 0 - 1 2 mg/dL    Alk Phos Isoenzymes 72 44 - 121 IU/L    AST 14 0 - 40 IU/L    ALT 23 0 - 44 IU/L   Lipid panel    Collection Time: 09/23/22  8:37 AM   Result Value Ref Range    Cholesterol, Total 153 100 - 199 mg/dL    Triglycerides 50 0 - 149 mg/dL    HDL 52 >39 mg/dL    LDL Calculated 90 0 - 99 mg/dL   Hemoglobin A1c (w/out EAG)    Collection Time: 09/23/22  8:37 AM   Result Value Ref Range    Hemoglobin A1C 6 1 (H) 4 8 - 5 6 %       This note was created with voice recognition software  Phonic, grammatical and spelling errors may be present within the note as a result

## 2022-11-29 ENCOUNTER — OFFICE VISIT (OUTPATIENT)
Dept: INTERNAL MEDICINE CLINIC | Facility: CLINIC | Age: 57
End: 2022-11-29

## 2022-11-29 VITALS
SYSTOLIC BLOOD PRESSURE: 128 MMHG | OXYGEN SATURATION: 97 % | BODY MASS INDEX: 41.75 KG/M2 | WEIGHT: 315 LBS | HEART RATE: 63 BPM | TEMPERATURE: 98.6 F | DIASTOLIC BLOOD PRESSURE: 90 MMHG | RESPIRATION RATE: 16 BRPM | HEIGHT: 73 IN

## 2022-11-29 DIAGNOSIS — Z98.84 WEIGHT GAIN FOLLOWING GASTRIC BYPASS SURGERY: ICD-10-CM

## 2022-11-29 DIAGNOSIS — I10 PRIMARY HYPERTENSION: Primary | ICD-10-CM

## 2022-11-29 DIAGNOSIS — R63.5 WEIGHT GAIN FOLLOWING GASTRIC BYPASS SURGERY: ICD-10-CM

## 2022-11-29 DIAGNOSIS — E66.01 CLASS 3 SEVERE OBESITY DUE TO EXCESS CALORIES WITH SERIOUS COMORBIDITY AND BODY MASS INDEX (BMI) OF 40.0 TO 44.9 IN ADULT (HCC): ICD-10-CM

## 2022-11-29 NOTE — PROGRESS NOTES
Assessment/Plan:     Patient is here for follow up; never got wegovy but will be seeing bariatrics in December; will be seeing me in April  Quality Measures:     BMI Counseling: There is no height or weight on file to calculate BMI  The BMI is above normal  Nutrition recommendations include decreasing portion sizes and encouraging healthy choices of fruits and vegetables  Exercise recommendations include moderate physical activity 150 minutes/week  Rationale for BMI follow-up plan is due to patient being overweight or obese  Depression Screening and Follow-up Plan: Patient was screened for depression during today's encounter  They screened negative with a PHQ-2 score of 0  Return for Next scheduled follow up  No problem-specific Assessment & Plan notes found for this encounter  Diagnoses and all orders for this visit:    Primary hypertension    Weight gain following gastric bypass surgery    Class 3 severe obesity due to excess calories with serious comorbidity and body mass index (BMI) of 40 0 to 44 9 in [facility])          Subjective:      Patient ID: Teri Torres is a 59 y o  male  Here for follow up        ALLERGIES:  Allergies   Allergen Reactions   • Prunus Persica Anaphylaxis     Also frankie, plums, apricot, nectarine       CURRENT MEDICATIONS:    Current Outpatient Medications:   •  atorvastatin (LIPITOR) 40 mg tablet, Take 1 tablet (40 mg total) by mouth daily, Disp: 30 tablet, Rfl: 5  •  CALCIUM CITRATE PO, Take by mouth, Disp: , Rfl:   •  Cholecalciferol (VITAMIN D3) 2000 units capsule, Take 1 tablet by mouth daily, Disp: , Rfl:   •  clobetasol (TEMOVATE) 0 05 % ointment, Apply topically 2 (two) times a day, Disp: 60 g, Rfl: 1  •  Cyanocobalamin (VITAMIN B-12) 5000 MCG SUBL, Place under the tongue, Disp: , Rfl:   •  ergocalciferol (VITAMIN D2) 50,000 units, Take 1 capsule (50,000 Units total) by mouth once a week for 12 doses, Disp: 4 capsule, Rfl: 2  •  ferrous sulfate 324 (65 Fe) mg, Take 1 tablet (324 mg total) by mouth 2 (two) times a day before meals, Disp: 90 tablet, Rfl: 2  •  lisinopril (ZESTRIL) 20 mg tablet, Take 1 tablet (20 mg total) by mouth daily, Disp: 30 tablet, Rfl: 3  •  Multiple Vitamin (MULTI-VITAMIN DAILY) TABS, Take by mouth, Disp: , Rfl:     Current Facility-Administered Medications:   •  cyanocobalamin injection 1,000 mcg, 1,000 mcg, Intramuscular, Q30 Days, [place], CRNP, 1,000 mcg at 04/25/19 1106    ACTIVE PROBLEM LIST:  Patient Active Problem List   Diagnosis   • Balanitis   • Osteoarthritis of first metatarsophalangeal joint   • Penile cyst   • Ventral hernia   • Umbilical hernia   • Vitamin D deficiency   • Weight gain following gastric bypass surgery   • Lichen planus   • Internal hemorrhoid   • Postgastrectomy malabsorption   • Seborrheic keratoses, inflamed   • Vitamin B12 deficiency   • Obesity, Class III, BMI 40-49 9 (morbid obesity) ([facility] [address] )   • At risk for obstructive sleep apnea   • Prediabetes   • Encounter for surgical aftercare following surgery of digestive system   • Educated about COVID-19 virus infection   • Screening for malignant neoplasm of colon   • Screening for malignant neoplasm of prostate   • Controlled type 2 diabetes mellitus with diabetic nephropathy, without long-term current use of insulin ([facility] [address] )       PAST MEDICAL HISTORY:  Past Medical History:   Diagnosis Date   • Arthritis     Last assessed: 5/21/12   • History of bariatric surgery    • Morbid obesity due to excess calories ([facility] [address] )     Last assessed: 5/21/12   • Obesity    • Postsurgical malabsorption    • Type 2 diabetes mellitus ([facility] [address] )     Last assessed: 6/5/15   • Vitamin B1 deficiency     Last assessed: 9/29/14   • Vitamin D deficiency     Last assessed: 9/29/14       PAST SURGICAL HISTORY:  Past Surgical History:   Procedure Laterality Date   • GASTRIC BYPASS      For Morbid Obesity  Managed by: Cyndi Kaufman   Last assessed: 6/16/14   • HERNIA REPAIR         FAMILY HISTORY:  Family History   Problem Relation Age of Onset   • Heart failure Mother         Congestive   • Hypertension Mother    • Heart disease Mother    • Bone cancer Father    • Throat cancer Father         Laryngeal   • Diabetes Neg Hx    • Thyroid disease Neg Hx    • Stroke Neg Hx        SOCIAL HISTORY:  Social History     Socioeconomic History   • Marital status: /Civil Union     Spouse name: Not on file   • Number of children: Not on file   • Years of education: Not on file   • Highest education level: Not on file   Occupational History   • Not on file   Tobacco Use   • Smoking status: Some Days     Types: Cigarettes     Last attempt to quit: 2010     Years since quittin 6   • Smokeless tobacco: Never   • Tobacco comments:     occasionally every couple days   Vaping Use   • Vaping Use: Never used   Substance and Sexual Activity   • Alcohol use: Yes     Comment: Social   • Drug use: No   • Sexual activity: Yes     Partners: Female   Other Topics Concern   • Not on file   Social History Narrative   • Not on file     Social Determinants of Health     Financial Resource Strain: Not on file   Food Insecurity: Not on file   Transportation Needs: Not on file   Physical Activity: Not on file   Stress: Not on file   Social Connections: Not on file   Intimate Partner Violence: Not on file   Housing Stability: Not on file       Review of Systems   Constitutional: Positive for unexpected weight change  All other systems reviewed and are negative  Objective:  Vitals:    22 0908 22 0917   BP: 162/90 128/90   BP Location: Right arm    Patient Position: Sitting    Cuff Size: Large    Pulse: 63    Resp: 16    Temp: 98 6 °F (37 °C)    TempSrc: Tympanic    SpO2: 97%    Weight: (!) 144 kg (317 lb)    Height: 6' 0 5" (1 842 m)      Body mass index is 42 4 kg/m²  Physical Exam  Vitals and nursing note reviewed  Constitutional:       Appearance: Normal appearance  He is obese     HENT: Head: Normocephalic and atraumatic  Cardiovascular:      Rate and Rhythm: Normal rate  Pulmonary:      Effort: Pulmonary effort is normal    Musculoskeletal:         General: Normal range of motion  Cervical back: Normal range of motion  Skin:     General: Skin is warm and dry  Neurological:      General: No focal deficit present  Mental Status: He is alert and oriented to person, place, and time  Mental status is at baseline  Psychiatric:         Mood and Affect: Mood normal            RESULTS:    No results found for this or any previous visit (from the past 1008 hour(s))  This note was created with voice recognition software  Phonic, grammatical and spelling errors may be present within the note as a result

## 2022-12-15 ENCOUNTER — CONSULT (OUTPATIENT)
Dept: BARIATRICS | Facility: CLINIC | Age: 57
End: 2022-12-15

## 2022-12-15 VITALS
DIASTOLIC BLOOD PRESSURE: 80 MMHG | WEIGHT: 315 LBS | BODY MASS INDEX: 44.1 KG/M2 | HEIGHT: 71 IN | RESPIRATION RATE: 16 BRPM | TEMPERATURE: 97.6 F | HEART RATE: 58 BPM | SYSTOLIC BLOOD PRESSURE: 138 MMHG

## 2022-12-15 DIAGNOSIS — R73.03 PREDIABETES: Primary | ICD-10-CM

## 2022-12-15 DIAGNOSIS — Z98.84 WEIGHT GAIN FOLLOWING GASTRIC BYPASS SURGERY: ICD-10-CM

## 2022-12-15 DIAGNOSIS — E66.01 OBESITY, CLASS III, BMI 40-49.9 (MORBID OBESITY) (HCC): ICD-10-CM

## 2022-12-15 DIAGNOSIS — K91.2 POSTSURGICAL MALABSORPTION: ICD-10-CM

## 2022-12-15 DIAGNOSIS — R63.5 WEIGHT GAIN FOLLOWING GASTRIC BYPASS SURGERY: ICD-10-CM

## 2022-12-15 NOTE — PROGRESS NOTES
Assessment/Plan:  Guille was seen today for consult  Diagnoses and all orders for this visit:    Prediabetes  -     Semaglutide,0 25 or 0 5MG/DOS, 2 MG/1 5ML SOPN; INJECT SAXENDA DAILY SUBCUTANEOUSLY  -WEEK 1: 0 6mg daily -if tolerated WEEK 2: 1 2mg daily -if tolerated WEEK 3: 1 8mg daily -if tolerated WEEK 4: 2 4mg daily -if tolerated WEEK 5: 3mg daily -IF NAUSEA/VOMITING DEVELOP STOP MEDICATION FOR A FEW DAYS AND DECREASE TO PREVIOUSLY TOLERATED DOSE  STAY HYDRATED  -IF YOU DEVELOP SEVERE ABDOMINAL PAIN WHICH MAY RADIATE TO THE BACK, SOMETIMES ASSOCIATED WITH FEVER, AND VOMITING, STOP MEDICATION AND SEEK URGENT CARE AS THIS MAY BE A SIGN OF PANCREATITIS   previously diabetic better control now but worsening HbA1c   Start Ozempic if covered by insurance  Pharmacy does not have it will change to Victoza  Lab Results   Component Value Date    HGBA1C 6 1 (H) 09/23/2022     Obesity, Class III, BMI 40-49 9 (morbid obesity) ([facility] [address] )  -     Semaglutide,0 25 or 0 5MG/DOS, 2 MG/1 5ML SOPN; INJECT SAXENDA DAILY SUBCUTANEOUSLY  -WEEK 1: 0 6mg daily -if tolerated WEEK 2: 1 2mg daily -if tolerated WEEK 3: 1 8mg daily -if tolerated WEEK 4: 2 4mg daily -if tolerated WEEK 5: 3mg daily -IF NAUSEA/VOMITING DEVELOP STOP MEDICATION FOR A FEW DAYS AND DECREASE TO PREVIOUSLY TOLERATED DOSE  STAY HYDRATED  -IF YOU DEVELOP SEVERE ABDOMINAL PAIN WHICH MAY RADIATE TO THE BACK, SOMETIMES ASSOCIATED WITH FEVER, AND VOMITING, STOP MEDICATION AND SEEK URGENT CARE AS THIS MAY BE A SIGN OF PANCREATITIS  Weight gain following gastric bypass surgery  -     Semaglutide,0 25 or 0 5MG/DOS, 2 MG/1 5ML SOPN; INJECT SAXENDA DAILY SUBCUTANEOUSLY  -WEEK 1: 0 6mg daily -if tolerated WEEK 2: 1 2mg daily -if tolerated WEEK 3: 1 8mg daily -if tolerated WEEK 4: 2 4mg daily -if tolerated WEEK 5: 3mg daily -IF NAUSEA/VOMITING DEVELOP STOP MEDICATION FOR A FEW DAYS AND DECREASE TO PREVIOUSLY TOLERATED DOSE  STAY HYDRATED   -IF YOU DEVELOP SEVERE ABDOMINAL PAIN WHICH MAY RADIATE TO THE BACK, SOMETIMES ASSOCIATED WITH FEVER, AND VOMITING, STOP MEDICATION AND SEEK URGENT CARE AS THIS MAY BE A SIGN OF PANCREATITIS  Postsurgical malabsorption    reviewed surgical notes  Advised optimal protein intake  Take bariatric vitamins       Weight not at goal  Nutrition prescription:  Protein: 80g daily  Encourage mindful eating, portion control, motivational interview performed to help patient reach goals   Patient denies personal and family history of  pancreatitis, thyroid cancer, MEN-2 tumors  Follow up in approximately 2 mo      Subjective:   Chief Complaint   Patient presents with   • Consult     Initial visit with Agnieszka Marie  Pt had Bariatric surgery 12 years ago  Patient here to discuss weight associated problems and nutrition goals  HPI: Guille Macias  is a 59 y o  male with excess weight/obesity here to pursue weight management  Most recent notes and records were reviewed  s/p Shae-En-Y Gastric Bypass with Dr Ilana Verdin on 05/03/2011  He is down 23lbs in last 10months and would like to continue progress  Recommend he avoid meal skipping and avoid grazing/snacking  Avoid refined CHO  He wishes to consult with [facility] for additional support      Initial: 418 5lbs  Current: 313 9lbs  EWL: 45%  [place]: 267lbs  · l      Wt Readings from Last 10 Encounters:   12/15/22 (!) 143 kg (316 lb)   11/29/22 (!) 144 kg (317 lb)   10/21/22 (!) 146 kg (321 lb 12 8 oz)   09/30/22 (!) 142 kg (313 lb 14 4 oz)   06/13/22 (!) 144 kg (318 lb 8 oz)   05/20/22 (!) 142 kg (314 lb)   04/15/22 (!) 143 kg (314 lb 9 6 oz)   03/18/22 (!) 146 kg (321 lb)   03/11/22 (!) 141 kg (310 lb 6 4 oz)   08/27/21 (!) 142 kg (313 lb 9 6 oz)   · Tolerating a regular diet-yes  · Eating at least 60 grams of protein per day-no d/t meal skipping  · Following 30/60 minute rule with liquids-yes  · Drinking at least 64 ounces of fluid per day-yes  · Drinking carbonated beverages-no  · Sufficient exercise-no; encouraged him to start walking program  · Using NSAIDs regularly-no  · Using nicotine-no  Using alcohol-no  Advised about the risks of alcohol s/p bariatric surgery and recommend avoiding all alcoho    Initial weight: today in [facility] office 316lbs      Food logging:no  Increased appetite/cravings: not struggling with hunger  B: sometimes skips B   works long hours and gets very tired , just started using the protein shakes  Just bought a  and is using it with mixing fruits and veggies  He likes fried fish    Exercise: none  Hydration:water 4 bottles per day  Alcohol: once a week  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       The following portions of the patient's history were reviewed and updated as appropriate: allergies, current medications, past family history, past medical history, past social history, past surgical history, and problem list       Review of Systems   Constitutional: Negative for activity change  Fatigue  HENT: Negative for trouble swallowing  Respiratory: Negative for shortness of breath  Cardiovascular: Negative for chest pain, edema  Gastrointestinal: Negative for abdominal pain, nausea and vomiting, acid reflux, +constipation/diarrhea  Psychiatric/Behavioral: Negative for behavioral problems , anxiety or depression    Objective:  /80 (BP Location: Right arm, Patient Position: Sitting, Cuff Size: Large)   Pulse 58   Temp 97 6 °F (36 4 °C)   Resp 16   Ht 5' 11" (1 803 m)   Wt (!) 143 kg (316 lb)   BMI 44 07 kg/m²   Constitutional: Well-developed, well-nourished and Obese Body mass index is 44 07 kg/m²  Gerhardt Sep HEENT: No conjunctival injection  No thyroid masses  Pulmonary: No increased work of breathing or signs of respiratory distress  Clear respiratory sounds  CV: Well-perfused, Regular rate and rhythm, no murmurs, no edema  GI: increased abdominal girth  Non-distended  Not tender   Neuro: Oriented to person, place and time  Normal Speech  Normal gait  Psych: Normal affect and mood  No delusion or hallucinations, normal thought process    Labs and Imaging  Recent labs and imaging have been personally reviewed    Lab Results   Component Value Date    WBC 4 7 09/23/2022    HGB 13 3 09/23/2022    HCT 40 9 09/23/2022    MCV 92 09/23/2022     09/23/2022     Lab Results   Component Value Date     09/26/2017    SODIUM 142 09/23/2022    K 4 1 09/23/2022     09/23/2022    CO2 19 (L) 09/23/2022    AGAP 7 06/19/2020    BUN 11 09/23/2022    CREATININE 0 70 (L) 09/23/2022    GLUC 104 (H) 09/23/2022    GLUF 81 06/19/2020    CALCIUM 8 2 (L) 06/19/2020    AST 14 09/23/2022    ALT 23 09/23/2022    ALKPHOS 67 06/19/2020    PROT 6 9 09/26/2017    TP 6 3 09/23/2022    BILITOT 0 4 09/26/2017    TBILI 0 3 09/23/2022    EGFR 106 09/23/2022     Lab Results   Component Value Date    HGBA1C 6 1 (H) 09/23/2022     Lab Results   Component Value Date    YXP2IRZVJSSA 2 250 05/30/2017    TSH 1 480 08/27/2021     Lab Results   Component Value Date    CHOLESTEROL 153 09/23/2022     Lab Results   Component Value Date    HDL 52 09/23/2022     Lab Results   Component Value Date    TRIG 50 09/23/2022     Lab Results   Component Value Date    LDLCALC 90 09/23/2022

## 2022-12-19 ENCOUNTER — TELEPHONE (OUTPATIENT)
Dept: BARIATRICS | Facility: CLINIC | Age: 57
End: 2022-12-19

## 2022-12-19 DIAGNOSIS — E11.21 CONTROLLED TYPE 2 DIABETES MELLITUS WITH DIABETIC NEPHROPATHY, WITHOUT LONG-TERM CURRENT USE OF INSULIN (HCC): Primary | ICD-10-CM

## 2022-12-19 RX ORDER — PEN NEEDLE, DIABETIC 30 GX3/16"
NEEDLE, DISPOSABLE MISCELLANEOUS DAILY
Qty: 30 EACH | Refills: 3 | Status: SHIPPED | OUTPATIENT
Start: 2022-12-19

## 2023-01-04 ENCOUNTER — HOSPITAL ENCOUNTER (EMERGENCY)
Facility: HOSPITAL | Age: 58
Discharge: HOME | End: 2023-01-04
Attending: EMERGENCY MEDICINE

## 2023-01-04 ENCOUNTER — APPOINTMENT (OUTPATIENT)
Dept: RADIOLOGY | Facility: HOSPITAL | Age: 58
End: 2023-01-04

## 2023-01-04 VITALS
SYSTOLIC BLOOD PRESSURE: 168 MMHG | DIASTOLIC BLOOD PRESSURE: 102 MMHG | HEART RATE: 69 BPM | RESPIRATION RATE: 22 BRPM | OXYGEN SATURATION: 98 % | TEMPERATURE: 98.1 F

## 2023-01-04 DIAGNOSIS — S20.212A CHEST WALL CONTUSION, LEFT, INITIAL ENCOUNTER: Primary | ICD-10-CM
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
## 2023-01-04 LAB
ALBUMIN SERPL BCP-MCNC: 3.6 G/DL (ref 3.5–5)
ALP SERPL-CCNC: 79 U/L (ref 46–116)
ALT SERPL W P-5'-P-CCNC: 35 U/L (ref 12–78)
ANION GAP SERPL CALCULATED.3IONS-SCNC: 10 MMOL/L (ref 4–13)
AST SERPL W P-5'-P-CCNC: 24 U/L (ref 5–45)
BASOPHILS # BLD AUTO: 0.03 THOUSANDS/ÂΜL (ref 0–0.1)
BASOPHILS NFR BLD AUTO: 1 % (ref 0–1)
BILIRUB SERPL-MCNC: 0.2 MG/DL (ref 0.2–1)
BUN SERPL-MCNC: 13 MG/DL (ref 5–25)
CALCIUM SERPL-MCNC: 8.6 MG/DL (ref 8.3–10.1)
CARDIAC TROPONIN I PNL SERPL HS: 11 NG/L
CHLORIDE SERPL-SCNC: 104 MMOL/L (ref 96–108)
CO2 SERPL-SCNC: 24 MMOL/L (ref 21–32)
CREAT SERPL-MCNC: 0.92 MG/DL (ref 0.6–1.3)
EOSINOPHIL # BLD AUTO: 0.08 THOUSAND/ÂΜL (ref 0–0.61)
EOSINOPHIL NFR BLD AUTO: 1 % (ref 0–6)
ERYTHROCYTE [DISTWIDTH] IN BLOOD BY AUTOMATED COUNT: 13.4 % (ref 11.6–15.1)
GFR SERPL CREATININE-BSD FRML MDRD: 90 ML/MIN/1.73SQ M
GLUCOSE SERPL-MCNC: 116 MG/DL (ref 65–140)
HCT VFR BLD AUTO: 45.3 % (ref 36.5–49.3)
HGB BLD-MCNC: 15 G/DL (ref 12–17)
IMM GRANULOCYTES # BLD AUTO: 0.02 THOUSAND/UL (ref 0–0.2)
IMM GRANULOCYTES NFR BLD AUTO: 0 % (ref 0–2)
LYMPHOCYTES # BLD AUTO: 1.46 THOUSANDS/ÂΜL (ref 0.6–4.47)
LYMPHOCYTES NFR BLD AUTO: 23 % (ref 14–44)
MCH RBC QN AUTO: 30 PG (ref 26.8–34.3)
MCHC RBC AUTO-ENTMCNC: 33.1 G/DL (ref 31.4–37.4)
MCV RBC AUTO: 91 FL (ref 82–98)
MONOCYTES # BLD AUTO: 0.58 THOUSAND/ÂΜL (ref 0.17–1.22)
MONOCYTES NFR BLD AUTO: 9 % (ref 4–12)
NEUTROPHILS # BLD AUTO: 4.2 THOUSANDS/ÂΜL (ref 1.85–7.62)
NEUTS SEG NFR BLD AUTO: 66 % (ref 43–75)
NRBC BLD AUTO-RTO: 0 /100 WBCS
PLATELET # BLD AUTO: 203 THOUSANDS/UL (ref 149–390)
PMV BLD AUTO: 8.7 FL (ref 8.9–12.7)
POTASSIUM SERPL-SCNC: 3.9 MMOL/L (ref 3.5–5.3)
PROT SERPL-MCNC: 7.6 G/DL (ref 6.4–8.4)
RBC # BLD AUTO: 5 MILLION/UL (ref 3.88–5.62)
SODIUM SERPL-SCNC: 138 MMOL/L (ref 135–147)
WBC # BLD AUTO: 6.37 THOUSAND/UL (ref 4.31–10.16)

## 2023-01-04 RX ORDER — LIDOCAINE 50 MG/G
1 PATCH TOPICAL DAILY
Qty: 7 PATCH | Refills: 0 | Status: SHIPPED | OUTPATIENT
Start: 2023-01-04

## 2023-01-04 RX ORDER — KETOROLAC TROMETHAMINE 30 MG/ML
15 INJECTION, SOLUTION INTRAMUSCULAR; INTRAVENOUS ONCE
Status: COMPLETED | OUTPATIENT
Start: 2023-01-04 | End: 2023-01-04

## 2023-01-04 RX ORDER — HYDROCODONE BITARTRATE AND ACETAMINOPHEN 5; 325 MG/1; MG/1
1 TABLET ORAL EVERY 6 HOURS PRN
Qty: 18 TABLET | Refills: 0 | Status: SHIPPED | OUTPATIENT
Start: 2023-01-04 | End: 2023-01-14

## 2023-01-04 RX ADMIN — KETOROLAC TROMETHAMINE 15 MG: 30 INJECTION, SOLUTION INTRAMUSCULAR at 13:08

## 2023-01-04 NOTE — ED PROVIDER NOTES
History  Chief Complaint   Patient presents with   • Fall     Pt reports he got dizzy about an hour ago, fell and left side on a bench  Pt denies hitting head, LOC, or blood thinners  Patient comes emergency department after a fall injuring his left lower chest wall  Patient has not been feeling well for a few days with cold-like symptoms  He woke up this morning and went to take some TheraFlu and while in the kitchen he, fell off to the side against a piece of furniture now has sharp severe left lower rib pain  The pain got acutely worse when he went to x-ray and lifted his arms he felt a pop on that same area where the pain is  The pain is sharp, constant  It was 9 out of 10 but currently 7 out of 10  No head injury no loss of consciousness  No trouble breathing        History provided by:  Patient   used: No        Prior to Admission Medications   Prescriptions Last Dose Informant Patient Reported? Taking?    CALCIUM CITRATE PO   Yes No   Sig: Take by mouth   Cholecalciferol (VITAMIN D3) 2000 units capsule   Yes No   Sig: Take 1 tablet by mouth daily   Cyanocobalamin (VITAMIN B-12) 5000 MCG SUBL   Yes No   Sig: Place under the tongue   Insulin Pen Needle (Pen Needles) 32G X 4 MM MISC   No No   Sig: Use in the morning To use with Victoza   Multiple Vitamin (MULTI-VITAMIN DAILY) TABS   Yes No   Sig: Take by mouth   Ozempic, 0 25 or 0 5 MG/DOSE, 2 MG/1 5ML SOPN   No No   Sig: ON BACK ORDER   atorvastatin (LIPITOR) 40 mg tablet   No No   Sig: Take 1 tablet (40 mg total) by mouth daily   clobetasol (TEMOVATE) 0 05 % ointment   No No   Sig: Apply topically 2 (two) times a day   ergocalciferol (VITAMIN D2) 50,000 units   No No   Sig: Take 1 capsule (50,000 Units total) by mouth once a week for 12 doses   ferrous sulfate 324 (65 Fe) mg   No No   Sig: Take 1 tablet (324 mg total) by mouth 2 (two) times a day before meals   liraglutide (VICTOZA) injection   No No   Sig: Inject 0 1 mL (0 6 mg total) under the skin daily   lisinopril (ZESTRIL) 20 mg tablet   No No   Sig: Take 1 tablet (20 mg total) by mouth daily      Facility-Administered Medications Last Administration Doses Remaining   cyanocobalamin injection 1,000 mcg 2019 11:06 AM           Past Medical History:   Diagnosis Date   • Arthritis     Last assessed: 12   • History of bariatric surgery    • Morbid obesity due to excess calories ([facility] )     Last assessed: 12   • Obesity    • Postsurgical malabsorption    • Type 2 diabetes mellitus ([facility] )     Last assessed: 6/5/15   • Vitamin B1 deficiency     Last assessed: 14   • Vitamin D deficiency     Last assessed: 14       Past Surgical History:   Procedure Laterality Date   • GASTRIC BYPASS      For Morbid Obesity  Managed by: Peg Doing  Last assessed: 14   • HERNIA REPAIR         Family History   Problem Relation Age of Onset   • Heart failure Mother         Congestive   • Hypertension Mother    • Heart disease Mother    • Bone cancer Father    • Throat cancer Father         Laryngeal   • Diabetes Neg Hx    • Thyroid disease Neg Hx    • Stroke Neg Hx      I have reviewed and agree with the history as documented  E-Cigarette/Vaping   • E-Cigarette Use Never User      E-Cigarette/Vaping Substances   • Nicotine No    • THC No    • CBD No    • Flavoring No    • Other No    • Unknown No      Social History     Tobacco Use   • Smoking status: Some Days     Types: Cigarettes     Last attempt to quit: 2010     Years since quittin 7   • Smokeless tobacco: Never   • Tobacco comments:     occasionally every couple days   Vaping Use   • Vaping Use: Never used   Substance Use Topics   • Alcohol use: Yes     Comment: Social   • Drug use: No       Review of Systems   Constitutional: Negative for chills and fever  HENT: Negative for ear pain and sore throat  Eyes: Negative for pain and visual disturbance     Respiratory: Negative for cough and shortness of breath  Cardiovascular: Positive for chest pain (L ribs)  Negative for palpitations  Gastrointestinal: Negative for abdominal pain and vomiting  Genitourinary: Negative for dysuria and hematuria  Musculoskeletal: Negative for arthralgias and back pain  Skin: Negative for color change and rash  Neurological: Negative for seizures and syncope  All other systems reviewed and are negative  Physical Exam  Physical Exam  Vitals and nursing note reviewed  Constitutional:       General: He is not in acute distress  Appearance: Normal appearance  He is well-developed  He is obese  HENT:      Head: Normocephalic and atraumatic  Right Ear: External ear normal       Left Ear: External ear normal       Nose: Nose normal       Mouth/Throat:      Mouth: Mucous membranes are moist    Eyes:      Extraocular Movements: Extraocular movements intact  Conjunctiva/sclera: Conjunctivae normal       Pupils: Pupils are equal, round, and reactive to light  Cardiovascular:      Rate and Rhythm: Normal rate and regular rhythm  Pulses: Normal pulses  Heart sounds: Normal heart sounds  No murmur heard  Pulmonary:      Effort: Pulmonary effort is normal  No respiratory distress  Breath sounds: Normal breath sounds  Chest:      Chest wall: Tenderness (Left lateral chest wall very tender, no crepitus) present  Abdominal:      General: Abdomen is flat  Palpations: Abdomen is soft  Tenderness: There is no abdominal tenderness  Musculoskeletal:         General: No swelling  Cervical back: Neck supple  Skin:     General: Skin is warm and dry  Capillary Refill: Capillary refill takes less than 2 seconds  Neurological:      General: No focal deficit present  Mental Status: He is alert and oriented to person, place, and time  Psychiatric:         Mood and Affect: Mood normal          Thought Content:  Thought content normal          Judgment: Judgment normal  Vital Signs  ED Triage Vitals [01/04/23 1028]   Temperature Pulse Respirations Blood Pressure SpO2   98 1 °F (36 7 °C) 69 22 (!) 168/102 98 %      Temp Source Heart Rate Source Patient Position - Orthostatic VS BP Location FiO2 (%)   Tympanic Monitor -- -- --      Pain Score       10 - Worst Possible Pain           Vitals:    01/04/23 1028   BP: (!) 168/102   Pulse: 69         Visual Acuity      ED Medications  Medications   ketorolac (TORADOL) injection 15 mg (15 mg Intramuscular Given 1/4/23 1308)       Diagnostic Studies  Results Reviewed     Procedure Component Value Units Date/Time    Comprehensive metabolic panel [[id number]] Collected: 01/04/23 1032    Lab Status: Final result Specimen: Blood from Arm, Right Updated: 01/04/23 1124     Sodium 138 mmol/L      Potassium 3 9 mmol/L      Chloride 104 mmol/L      CO2 24 mmol/L      ANION GAP 10 mmol/L      BUN 13 mg/dL      Creatinine 0 92 mg/dL      Glucose 116 mg/dL      Calcium 8 6 mg/dL      AST 24 U/L      ALT 35 U/L      Alkaline Phosphatase 79 U/L      Total Protein 7 6 g/dL      Albumin 3 6 g/dL      Total Bilirubin 0 20 mg/dL      eGFR 90 ml/min/1 73sq m     Narrative:      Zay guidelines for Chronic Kidney Disease (CKD):   •  Stage 1 with normal or high GFR (GFR > 90 mL/min/1 73 square meters)  •  Stage 2 Mild CKD (GFR = 60-89 mL/min/1 73 square meters)  •  Stage 3A Moderate CKD (GFR = 45-59 mL/min/1 73 square meters)  •  Stage 3B Moderate CKD (GFR = 30-44 mL/min/1 73 square meters)  •  Stage 4 Severe CKD (GFR = 15-29 mL/min/1 73 square meters)  •  Stage 5 End Stage CKD (GFR <15 mL/min/1 73 square meters)  Note: GFR calculation is accurate only with a steady state creatinine    HS Troponin 0hr (reflex protocol) [[id number]]  (Normal) Collected: 01/04/23 1032    Lab Status: Final result Specimen: Blood from Arm, Right Updated: 01/04/23 1112     hs TnI 0hr 11 ng/L     CBC and differential [[id number]]  (Abnormal) Collected: 01/04/23 1032    Lab Status: Final result Specimen: Blood from Arm, Right Updated: 01/04/23 1038     WBC 6 37 Thousand/uL      RBC 5 00 Million/uL      Hemoglobin 15 0 g/dL      Hematocrit 45 3 %      MCV 91 fL      MCH 30 0 pg      MCHC 33 1 g/dL      RDW 13 4 %      MPV 8 7 fL      Platelets 645 Thousands/uL      nRBC 0 /100 WBCs      Neutrophils Relative 66 %      Immat GRANS % 0 %      Lymphocytes Relative 23 %      Monocytes Relative 9 %      Eosinophils Relative 1 %      Basophils Relative 1 %      Neutrophils Absolute 4 20 Thousands/µL      Immature Grans Absolute 0 02 Thousand/uL      Lymphocytes Absolute 1 46 Thousands/µL      Monocytes Absolute 0 58 Thousand/µL      Eosinophils Absolute 0 08 Thousand/µL      Basophils Absolute 0 03 Thousands/µL                  XR chest 2 views   ED Interpretation by Devin Huynh MD (01/04 1253)   nad      Final Result by Mendez Jiménez MD (01/04 9076)      No acute cardiopulmonary disease  Workstation performed: [id number]                    Procedures  Procedures         ED Course                                             Medical Decision Making  Amount and/or Complexity of Data Reviewed  Labs: ordered  Details: Lab work is essentially normal   Radiology: ordered and independent interpretation performed  Details: No pneumothorax no pleural effusion  No discrete rib fractures  However clinically patient very tender to palpation and movement suggests a nondisplaced rib fracture  Disposition  Final diagnoses:   Chest wall contusion, left, initial encounter   Fall, initial encounter     Time reflects when diagnosis was documented in both MDM as applicable and the Disposition within this note     Time User Action Codes Description Comment    1/4/2023 12:59 PM Elio Boss Add [S20 212A] Chest wall contusion, left, initial encounter     1/4/2023 12:59 PM Elio Esteban [U99  XXXA] Fall, initial encounter       ED Disposition     ED Disposition   Discharge    Condition   Stable    Date/Time   Wed Jan 4, 2023  1:02 PM    Comment   Guille M [place] discharge to home/self care                 Follow-up Information     Follow up With Specialties Details Why Pacheco Montoya MD Internal Medicine In 1 week  [address] [facility]  [phone]            Discharge Medication List as of 1/4/2023  1:02 PM      START taking these medications    Details   HYDROcodone-acetaminophen (Norco) 5-325 mg per tablet Take 1 tablet by mouth every 6 (six) hours as needed for pain for up to 10 days Max Daily Amount: 4 tablets, Starting Wed 1/4/2023, Until Sat 1/14/2023 at 2359, Normal      lidocaine (Lidoderm) 5 % Apply 1 patch topically daily Remove & Discard patch within 12 hours or as directed by MD, Starting Wed 1/4/2023, Normal         CONTINUE these medications which have NOT CHANGED    Details   atorvastatin (LIPITOR) 40 mg tablet Take 1 tablet (40 mg total) by mouth daily, Starting Fri 3/11/2022, Normal      CALCIUM CITRATE PO Take by mouth, Historical Med      Cholecalciferol (VITAMIN D3) 2000 units capsule Take 1 tablet by mouth daily, Starting Wed 6/7/2017, Historical Med      clobetasol (TEMOVATE) 0 05 % ointment Apply topically 2 (two) times a day, Starting Thu 2/18/2021, Normal      Cyanocobalamin (VITAMIN B-12) 5000 MCG SUBL Place under the tongue, Historical Med      ergocalciferol (VITAMIN D2) 50,000 units Take 1 capsule (50,000 Units total) by mouth once a week for 12 doses, Starting Fri 10/21/2022, Until Sat 1/7/2023, Normal      ferrous sulfate 324 (65 Fe) mg Take 1 tablet (324 mg total) by mouth 2 (two) times a day before meals, Starting Fri 10/21/2022, Normal      Insulin Pen Needle (Pen Needles) 32G X 4 MM MISC Use in the morning To use with Victoza, Starting Mon 12/19/2022, Normal      liraglutide (VICTOZA) injection Inject 0 1 mL (0 6 mg total) under the skin daily, Starting Thu 12/15/2022, Normal      lisinopril (ZESTRIL) 20 mg tablet Take 1 tablet (20 mg total) by mouth daily, Starting Thu 8/25/2022, Until Tue 11/29/2022, Normal      Multiple Vitamin (MULTI-VITAMIN DAILY) TABS Take by mouth, Historical Med      Ozempic, 0 25 or 0 5 MG/DOSE, 2 MG/1 5ML SOPN ON BACK ORDER, Normal             No discharge procedures on file      PDMP Review     None          ED Provider  Electronically Signed by           Isaac German MD  01/05/23 9861

## 2023-01-04 NOTE — DISCHARGE INSTRUCTIONS
A  personal message from Dr Hever Medina,  Thank you so much for allowing me to care for you today  I pride myself in the care and attention I give all my patients  I hope you were a witness to this tonight  If for any reason your condition does not improve, worsens, or you have a question that was not answered during your visit you can feel free to text me on my personal phone   # [phone]  I will answer to your message and continue your care past your emergency room visit

## 2023-01-04 NOTE — Clinical Note
Guille Macias was seen and treated in our emergency department on 1/4/2023  No restrictions            Diagnosis:     Guille    He may return on this date: If you have any questions or concerns, please don't hesitate to call        Rohan Quintero MD    ______________________________           _______________          _______________  Hospital Representative                              Date                                Time

## 2023-01-04 NOTE — Clinical Note
Guille Macias was seen and treated in our emergency department on 1/4/2023  Diagnosis:     Guille    He may return on this date: If you have any questions or concerns, please don't hesitate to call        Sukhi Shelton MD    ______________________________           _______________          _______________  Hospital Representative                              Date                                Time

## 2023-01-04 NOTE — Clinical Note
Brandon Crowder accompanied Guille Crowder to the emergency department on 1/4/2023  Return date if applicable: 79/80/3305        If you have any questions or concerns, please don't hesitate to call        Benito Page RN

## 2023-01-05 LAB
ATRIAL RATE: 73 BPM
P AXIS: 54 DEGREES
PR INTERVAL: 152 MS
QRS AXIS: 21 DEGREES
QRSD INTERVAL: 88 MS
QT INTERVAL: 366 MS
QTC INTERVAL: 403 MS
T WAVE AXIS: -34 DEGREES
VENTRICULAR RATE: 73 BPM

## 2023-01-26 DIAGNOSIS — E55.9 VITAMIN D DEFICIENCY: ICD-10-CM

## 2023-01-26 RX ORDER — ERGOCALCIFEROL 1.25 MG/1
50000 CAPSULE ORAL WEEKLY
Qty: 4 CAPSULE | Refills: 2 | Status: SHIPPED | OUTPATIENT
Start: 2023-01-26 | End: 2023-04-14

## 2023-02-27 ENCOUNTER — OFFICE VISIT (OUTPATIENT)
Dept: URGENT CARE | Facility: CLINIC | Age: 58
End: 2023-02-27

## 2023-02-27 VITALS
TEMPERATURE: 97.2 F | OXYGEN SATURATION: 98 % | BODY MASS INDEX: 42.26 KG/M2 | RESPIRATION RATE: 18 BRPM | WEIGHT: 303 LBS | HEART RATE: 68 BPM

## 2023-02-27 DIAGNOSIS — S61.211A LACERATION OF LEFT INDEX FINGER WITHOUT FOREIGN BODY WITHOUT DAMAGE TO NAIL, INITIAL ENCOUNTER: Primary | ICD-10-CM

## 2023-02-27 NOTE — PROGRESS NOTES
[facility]        NAME: Erlinda Anderson is a 59 y o  male  : 1965    MRN: [id number]  DATE: 2023  TIME: 12:03 PM    Assessment and Plan   Laceration of left index finger without foreign body without damage to nail, initial encounter [S61 211A]  1  Laceration of left index finger without foreign body without damage to nail, initial encounter  Tdap Vaccine greater than or equal to 8yo    Laceration repair        Laceration repaired with 3 sutures  Tdap updated  Patient instructed to return in 10 days for suture removal    Laceration repair    Date/Time: 2023 11:56 AM  Performed by: Laurel Boland PA-C  Authorized by: Laurel Boland PA-C   Consent: Verbal consent obtained  Risks and benefits: risks, benefits and alternatives were discussed  Consent given by: patient  Patient understanding: patient states understanding of the procedure being performed  Patient consent: the patient's understanding of the procedure matches consent given  Procedure consent: procedure consent matches procedure scheduled  Relevant documents: relevant documents present and verified  Test results: test results available and properly labeled  Site marked: the operative site was marked  Required items: required blood products, implants, devices, and special equipment available  Patient identity confirmed: verbally with patient and provided demographic data  Time out: Immediately prior to procedure a "time out" was called to verify the correct patient, procedure, equipment, support staff and site/side marked as required    Body area: upper extremity  Location details: left index finger  Laceration length: 2 5 cm  Foreign bodies: no foreign bodies  Tendon involvement: none  Nerve involvement: none  Vascular damage: no  Anesthesia: local infiltration    Anesthesia:  Local Anesthetic: lidocaine 2% without epinephrine    Wound Dehiscence:  Superficial Wound Dehiscence: simple closure      Procedure Details:  Preparation: Patient was prepped and draped in the usual sterile fashion  Irrigation solution: saline  Skin closure: 5-0 Prolene  Number of sutures: 3  Technique: simple  Approximation: close  Approximation difficulty: simple  Dressing: non-adhesive packing strip and gauze roll  Patient tolerance: patient tolerated the procedure well with no immediate complications          Patient Instructions       Follow up with PCP in 3-5 days  Proceed to  ER if symptoms worsen  Chief Complaint     Chief Complaint   Patient presents with   • Laceration     Left first finger laceration that happed at 4:30 this morning  The kids grabbed the knife and flipped up in the air and hurt his finger  Last Tetanus 2018  History of Present Illness       Patient is a 60 yo male who presents for evaluation of a laceration on his left index finger  He reports that early this morning a knife slipped and cut his finger  He denies any numbness, tingling, weakness, reduced ROM  Last Tdap was in 2018         Review of Systems   Review of Systems      Current Medications       Current Outpatient Medications:   •  atorvastatin (LIPITOR) 40 mg tablet, Take 1 tablet (40 mg total) by mouth daily, Disp: 30 tablet, Rfl: 5  •  CALCIUM CITRATE PO, Take by mouth, Disp: , Rfl:   •  Cholecalciferol (VITAMIN D3) 2000 units capsule, Take 1 tablet by mouth daily, Disp: , Rfl:   •  clobetasol (TEMOVATE) 0 05 % ointment, Apply topically 2 (two) times a day, Disp: 60 g, Rfl: 1  •  Cyanocobalamin (VITAMIN B-12) 5000 MCG SUBL, Place under the tongue, Disp: , Rfl:   •  ergocalciferol (VITAMIN D2) 50,000 units, Take 1 capsule (50,000 Units total) by mouth once a week for 12 doses, Disp: 4 capsule, Rfl: 2  •  ferrous sulfate 324 (65 Fe) mg, Take 1 tablet (324 mg total) by mouth 2 (two) times a day before meals, Disp: 90 tablet, Rfl: 2  •  Insulin Pen Needle (Pen Needles) 32G X 4 MM MISC, Use in the morning To use with Victoza, Disp: 30 each, Rfl: 3  •  lidocaine (Lidoderm) 5 %, Apply 1 patch topically daily Remove & Discard patch within 12 hours or as directed by MD, Disp: 7 patch, Rfl: 0  •  liraglutide (VICTOZA) injection, Inject 0 1 mL (0 6 mg total) under the skin daily, Disp: 3 mL, Rfl: 1  •  lisinopril (ZESTRIL) 20 mg tablet, Take 1 tablet (20 mg total) by mouth daily, Disp: 30 tablet, Rfl: 3  •  Multiple Vitamin (MULTI-VITAMIN DAILY) TABS, Take by mouth, Disp: , Rfl:   •  Ozempic, 0 25 or 0 5 MG/DOSE, 2 MG/1 5ML SOPN, ON BACK ORDER, Disp: 3 mL, Rfl: 1    Current Facility-Administered Medications:   •  cyanocobalamin injection 1,000 mcg, 1,000 mcg, Intramuscular, Q30 Days, NEAL Lundberg, 1,000 mcg at 04/25/19 1106    Current Allergies     Allergies as of 02/27/2023 - Reviewed 02/27/2023   Allergen Reaction Noted   • 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            The following portions of the patient's history were reviewed and updated as appropriate: allergies, current medications, past family history, past medical history, past social history, past surgical history and problem list      Past Medical History:   Diagnosis Date   • Arthritis     Last assessed: 5/21/12   • History of bariatric surgery    • Morbid obesity due to excess calories ([facility] )     Last assessed: 5/21/12   • Obesity    • Postsurgical malabsorption    • Type 2 diabetes mellitus ([facility] )     Last assessed: 6/5/15   • Vitamin B1 deficiency     Last assessed: 9/29/14   • Vitamin D deficiency     Last assessed: 9/29/14       Past Surgical History:   Procedure Laterality Date   • GASTRIC BYPASS      For Morbid Obesity  Managed by: Corina Salazar   Last assessed: 6/16/14   • HERNIA REPAIR         Family History   Problem Relation Age of Onset   • Heart failure Mother         Congestive   • Hypertension Mother    • Heart disease Mother    • Bone cancer Father    • Throat cancer Father         Laryngeal   • Diabetes Neg Hx    • Thyroid disease Neg Hx    • Stroke Neg Hx Medications have been verified  Objective   Pulse 68   Temp (!) 97 2 °F (36 2 °C)   Resp 18   Wt (!) 137 kg (303 lb)   SpO2 98%   BMI 42 26 kg/m²        Physical Exam     Physical Exam  Constitutional:       General: He is not in acute distress  Appearance: He is not toxic-appearing  Cardiovascular:      Rate and Rhythm: Normal rate  Pulmonary:      Effort: Pulmonary effort is normal    Skin:     Comments: 2 5 cm gaping laceration on dorsal left index finger from MTP to PIP  Full finger ROM  Sensation intact   Neurological:      Mental Status: He is alert     Psychiatric:         Mood and Affect: Mood normal          Behavior: Behavior normal

## 2023-02-27 NOTE — LETTER
February 27, 2023     Patient: Destinee Macias   YOB: 1965   Date of Visit: 2/27/2023       To Whom it May Concern:    Guille Colleen was seen in my clinic on 2/27/2023  If you have any questions or concerns, please don't hesitate to call           Sincerely,          Sowmya Lynn PA-C        CC: No Recipients

## 2023-03-03 ENCOUNTER — OFFICE VISIT (OUTPATIENT)
Dept: BARIATRICS | Facility: CLINIC | Age: 58
End: 2023-03-03

## 2023-03-03 VITALS
DIASTOLIC BLOOD PRESSURE: 82 MMHG | BODY MASS INDEX: 45.1 KG/M2 | RESPIRATION RATE: 16 BRPM | HEART RATE: 56 BPM | HEIGHT: 70 IN | WEIGHT: 315 LBS | SYSTOLIC BLOOD PRESSURE: 128 MMHG

## 2023-03-03 DIAGNOSIS — R73.03 PREDIABETES: ICD-10-CM

## 2023-03-03 DIAGNOSIS — E66.01 OBESITY, CLASS III, BMI 40-49.9 (MORBID OBESITY) (HCC): Primary | ICD-10-CM

## 2023-03-03 DIAGNOSIS — S61.219A: ICD-10-CM

## 2023-03-03 NOTE — PROGRESS NOTES
Assessment/Plan:  Guille was seen today for follow-up  Diagnoses and all orders for this visit:    Obesity, Class III, BMI 40-49 9 (morbid obesity) (HCC)    Prediabetes  -     liraglutide (VICTOZA) injection; Inject 0 2 mL (1 2 mg total) under the skin daily    Laceration of skin of finger    Currently following conservative program   On Victoza  3 pound weight gain since last visit  Increase Victoza from 0 6 mg daily to 1 2 mg daily  Laceration of left index finger appears to be healing well  Originally had 3 sutures but patient did remove the untied suture in the office today and two remain  If laceration opens up patient instructed to go to the urgent care or PCP for evaluation  Goals:  Food log (ie ) www myfitnesspal com,sparkpeople  com,loseit com,calorieking  com,etc  , No sugary beverages  At least 64oz of water daily  , Increase physical activity by 10 minutes daily and Gradually increase physical activity to a goal of 5 days per week for 30 minutes of MODERATE intensity PLUS 2 days per week of FULL BODY resistance training      Follow up in approximately 2 months with Non-Surgical Physician/Advanced Practitioner  Subjective:   Chief Complaint   Patient presents with   • Follow-up       Patient ID: Bandar Mims  is a 59 y o  male with excess weight/obesity here to pursue weight management  Patient is pursuing Conservative Program    Most recent notes and records were reviewed  Patient presents for medical weight management follow-up  Does have history of Shae-en-Y gastric bypass back on 5/3/2011  More recently started on Victoza for diabetes and for weight loss  Currently taking Victoza 0 6 mg daily  Denies new nausea, vomiting, constipation, abdominal pain or back pain  Has not noticed any change in weight but admits he was not very compliant with diet last week  He was at a birthday party for his grandkids  He did stay in a hotel  He ate out often    In addition he suffered a laceration at that party  3 sutures were placed in his left index finger  He informs me that the middle suture has come untied     -Initial weight loss goal of 5-10% weight loss for improved health  Wt Readings from Last 10 Encounters:   03/03/23 (!) 144 kg (316 lb 14 4 oz)   02/27/23 (!) 137 kg (303 lb)   12/15/22 (!) 143 kg (316 lb)   11/29/22 (!) 144 kg (317 lb)   10/21/22 (!) 146 kg (321 lb 12 8 oz)   09/30/22 (!) 142 kg (313 lb 14 4 oz)   06/13/22 (!) 144 kg (318 lb 8 oz)   05/20/22 (!) 142 kg (314 lb)   04/15/22 (!) 143 kg (314 lb 9 6 oz)   03/18/22 (!) 146 kg (321 lb)     Initial weight: 418 5lbs  Current weight: 316 9lbs (+3lbs from last MWM OV)          The following portions of the patient's history were reviewed and updated as appropriate: allergies, current medications, past family history, past medical history, past social history, past surgical history, and problem list     Review of Systems   Constitutional: Negative for fever  Respiratory: Negative for shortness of breath  Cardiovascular: Negative for chest pain  Objective:  /82 (BP Location: Left arm, Patient Position: Sitting, Cuff Size: Large)   Pulse 56   Resp 16   Ht 5' 10" (1 778 m)   Wt (!) 144 kg (316 lb 14 4 oz)   BMI 45 47 kg/m²   Constitutional: Well-developed, well-nourished and Obese Body mass index is 45 47 kg/m²  Natalie Stoddard HEENT: No conjunctival injection  Pulmonary: No increased work of breathing or signs of respiratory distress  CV: Well-perfused  GI: Obese  Non-distended  MSK: No edema   Derm: Left index finger, dorsal aspect between MCP and PIP joint there is a well-healing laceration with the proximal and distal sutures intact but the center suture has come untied  Patient removed suture himself in the office  Laceration remained closed  Neuro: Oriented to person, place and time  Normal Speech  Normal gait  Psych: Normal affect and mood       Labs and Imaging  Recent labs and imaging have been personally reviewed    Lab Results   Component Value Date    WBC 6 37 01/04/2023    HGB 15 0 01/04/2023    HCT 45 3 01/04/2023    MCV 91 01/04/2023     01/04/2023     Lab Results   Component Value Date     09/26/2017    SODIUM 138 01/04/2023    K 3 9 01/04/2023     01/04/2023    CO2 24 01/04/2023    AGAP 10 01/04/2023    BUN 13 01/04/2023    CREATININE 0 92 01/04/2023    GLUC 116 01/04/2023    GLUF 81 06/19/2020    CALCIUM 8 6 01/04/2023    AST 24 01/04/2023    ALT 35 01/04/2023    ALKPHOS 79 01/04/2023    PROT 6 9 09/26/2017    TP 7 6 01/04/2023    BILITOT 0 4 09/26/2017    TBILI 0 20 01/04/2023    EGFR 90 01/04/2023     Lab Results   Component Value Date    HGBA1C 6 1 (H) 09/23/2022     Lab Results   Component Value Date    ONJ7JUIBHXEH 2 250 05/30/2017    TSH 1 480 08/27/2021     Lab Results   Component Value Date    CHOLESTEROL 153 09/23/2022     Lab Results   Component Value Date    HDL 52 09/23/2022     Lab Results   Component Value Date    TRIG 50 09/23/2022     Lab Results   Component Value Date    LDLCALC 90 09/23/2022

## 2023-03-13 ENCOUNTER — TELEPHONE (OUTPATIENT)
Dept: BARIATRICS | Facility: CLINIC | Age: 58
End: 2023-03-13

## 2023-04-28 ENCOUNTER — OFFICE VISIT (OUTPATIENT)
Dept: INTERNAL MEDICINE CLINIC | Facility: CLINIC | Age: 58
End: 2023-04-28

## 2023-04-28 VITALS
BODY MASS INDEX: 44.81 KG/M2 | TEMPERATURE: 98.6 F | HEART RATE: 64 BPM | HEIGHT: 70 IN | OXYGEN SATURATION: 98 % | SYSTOLIC BLOOD PRESSURE: 156 MMHG | DIASTOLIC BLOOD PRESSURE: 90 MMHG | WEIGHT: 313 LBS | RESPIRATION RATE: 16 BRPM

## 2023-04-28 DIAGNOSIS — E55.9 VITAMIN D DEFICIENCY: ICD-10-CM

## 2023-04-28 DIAGNOSIS — E11.21 CONTROLLED TYPE 2 DIABETES MELLITUS WITH DIABETIC NEPHROPATHY, WITHOUT LONG-TERM CURRENT USE OF INSULIN (HCC): Primary | ICD-10-CM

## 2023-04-28 DIAGNOSIS — Z12.5 PROSTATE CANCER SCREENING: ICD-10-CM

## 2023-04-28 DIAGNOSIS — I10 PRIMARY HYPERTENSION: ICD-10-CM

## 2023-04-28 PROBLEM — R73.03 PREDIABETES: Status: RESOLVED | Noted: 2019-11-07 | Resolved: 2023-04-28

## 2023-04-28 PROBLEM — R63.5 WEIGHT GAIN FOLLOWING GASTRIC BYPASS SURGERY: Status: RESOLVED | Noted: 2017-10-27 | Resolved: 2023-04-28

## 2023-04-28 PROBLEM — Z98.84 WEIGHT GAIN FOLLOWING GASTRIC BYPASS SURGERY: Status: RESOLVED | Noted: 2017-10-27 | Resolved: 2023-04-28

## 2023-04-28 LAB
SL AMB POCT HEMOGLOBIN AIC: 5.6 (ref ?–6.5)
SL AMB POCT UR MICROALBUMIN: 10

## 2023-04-28 NOTE — PROGRESS NOTES
Assessment/Plan:      Here for follow up;     obesity and he has a h/o gastric bypass; working with bariatric surgery and is on victoza; reports some fatigue that may be r/t picking up extra shifts at work  a1c is 5 4, DM is well controlled; he is on a statin and an ACEI  Eye exam coming up  Labs before next visit  Quality Measures:     BMI Counseling: Body mass index is 44 91 kg/m²  The BMI is above normal  Nutrition recommendations include decreasing portion sizes and encouraging healthy choices of fruits and vegetables  Exercise recommendations include moderate physical activity 150 minutes/week  Rationale for BMI follow-up plan is due to patient being overweight or obese  Depression Screening and Follow-up Plan: Clincally patient does not have depression  No treatment is required  Tobacco Cessation Counseling: Tobacco cessation counseling was provided  The patient is sincerely urged to quit consumption of tobacco  He is not ready to quit tobacco           Return in about 4 months (around 8/28/2023)  No problem-specific Assessment & Plan notes found for this encounter  Diagnoses and all orders for this visit:    Controlled type 2 diabetes mellitus with diabetic nephropathy, without long-term current use of insulin (HCC)  -     POCT urine microalbumin/creatinine  -     POCT hemoglobin A1c  -     Hemoglobin A1C; Future  -     Lipid Panel with Direct LDL reflex; Future    Prostate cancer screening  -     PSA, total and free; Future    Primary hypertension  -     CBC and differential; Future  -     Comprehensive metabolic panel; Future  -     TSH, 3rd generation with Free T4 reflex; Future    Vitamin D deficiency  -     Vitamin D 25 hydroxy; Future          Subjective:      Patient ID: Bill Silva is a 59 y o  male  Here for routine f/u        ALLERGIES:  Allergies   Allergen Reactions   • Prunus Persica Anaphylaxis     Also frankie, plums, apricot, nectarine       CURRENT MEDICATIONS:    Current Outpatient Medications:   •  atorvastatin (LIPITOR) 40 mg tablet, Take 1 tablet (40 mg total) by mouth daily, Disp: 30 tablet, Rfl: 5  •  CALCIUM CITRATE PO, Take by mouth, Disp: , Rfl:   •  Cholecalciferol (VITAMIN D3) 2000 units capsule, Take 1 tablet by mouth daily, Disp: , Rfl:   •  clobetasol (TEMOVATE) 0 05 % ointment, Apply topically 2 (two) times a day, Disp: 60 g, Rfl: 1  •  Cyanocobalamin (VITAMIN B-12) 5000 MCG SUBL, Place under the tongue, Disp: , Rfl:   •  ergocalciferol (VITAMIN D2) 50,000 units, Take 1 capsule (50,000 Units total) by mouth once a week for 12 doses, Disp: 4 capsule, Rfl: 2  •  ferrous sulfate 324 (65 Fe) mg, Take 1 tablet (324 mg total) by mouth 2 (two) times a day before meals, Disp: 90 tablet, Rfl: 2  •  Insulin Pen Needle (Pen Needles) 32G X 4 MM MISC, Use in the morning To use with Victoza, Disp: 30 each, Rfl: 3  •  liraglutide (VICTOZA) injection, Inject 0 2 mL (1 2 mg total) under the skin daily, Disp: , Rfl:   •  Multiple Vitamin (MULTI-VITAMIN DAILY) TABS, Take by mouth, Disp: , Rfl:   •  lisinopril (ZESTRIL) 20 mg tablet, Take 1 tablet (20 mg total) by mouth daily, Disp: 30 tablet, Rfl: 3    Current Facility-Administered Medications:   •  cyanocobalamin injection 1,000 mcg, 1,000 mcg, Intramuscular, Q30 Days, NEAL Gaviria, 1,000 mcg at 04/25/19 1106    ACTIVE PROBLEM LIST:  Patient Active Problem List   Diagnosis   • Balanitis   • Osteoarthritis of first metatarsophalangeal joint   • Penile cyst   • Ventral hernia   • Umbilical hernia   • Vitamin D deficiency   • Weight gain following gastric bypass surgery   • Lichen planus   • Internal hemorrhoid   • Postgastrectomy malabsorption   • Seborrheic keratoses, inflamed   • Vitamin B12 deficiency   • Obesity, Class III, BMI 40-49 9 (morbid obesity) (HCC)   • At risk for obstructive sleep apnea   • Prediabetes   • Encounter for surgical aftercare following surgery of digestive system   • Educated about COVID-19 virus infection   • Screening for malignant neoplasm of colon   • Screening for malignant neoplasm of prostate   • Controlled type 2 diabetes mellitus with diabetic nephropathy, without long-term current use of insulin ([facility] )       PAST MEDICAL HISTORY:  Past Medical History:   Diagnosis Date   • Arthritis     Last assessed: 12   • History of bariatric surgery    • Morbid obesity due to excess calories ([facility] )     Last assessed: 12   • Obesity    • Postsurgical malabsorption    • Type 2 diabetes mellitus ([facility] )     Last assessed: 6/5/15   • Vitamin B1 deficiency     Last assessed: 14   • Vitamin D deficiency     Last assessed: 14       PAST SURGICAL HISTORY:  Past Surgical History:   Procedure Laterality Date   • GASTRIC BYPASS      For Morbid Obesity  Managed by: David Harman   Last assessed: 14   • HERNIA REPAIR         FAMILY HISTORY:  Family History   Problem Relation Age of Onset   • Heart failure Mother         Congestive   • Hypertension Mother    • Heart disease Mother    • Bone cancer Father    • Throat cancer Father         Laryngeal   • Diabetes Neg Hx    • Thyroid disease Neg Hx    • Stroke Neg Hx        SOCIAL HISTORY:  Social History     Socioeconomic History   • Marital status: /Civil Union     Spouse name: Not on file   • Number of children: Not on file   • Years of education: Not on file   • Highest education level: Not on file   Occupational History   • Not on file   Tobacco Use   • Smoking status: Some Days     Types: Cigarettes     Last attempt to quit: 2010     Years since quittin 0   • Smokeless tobacco: Never   • Tobacco comments:     occasionally every couple days   Vaping Use   • Vaping Use: Never used   Substance and Sexual Activity   • Alcohol use: Yes     Comment: Social   • Drug use: No   • Sexual activity: Yes     Partners: Female   Other Topics Concern   • Not on file   Social History Narrative   • Not on file     Social Determinants of "Health     Financial Resource Strain: Not on file   Food Insecurity: Not on file   Transportation Needs: Not on file   Physical Activity: Not on file   Stress: Not on file   Social Connections: Not on file   Intimate Partner Violence: Not on file   Housing Stability: Not on file       Review of Systems   Constitutional: Positive for fever  All other systems reviewed and are negative  Objective:  Vitals:    04/28/23 0907   BP: 156/90   BP Location: Left arm   Patient Position: Sitting   Cuff Size: Extra-Large   Pulse: 64   Resp: 16   Temp: 98 6 °F (37 °C)   TempSrc: Tympanic   SpO2: 98%   Weight: (!) 142 kg (313 lb)   Height: 5' 10\" (1 778 m)     Body mass index is 44 91 kg/m²  Physical Exam  Vitals and nursing note reviewed  Constitutional:       Appearance: Normal appearance  He is obese  HENT:      Head: Normocephalic and atraumatic  Cardiovascular:      Rate and Rhythm: Normal rate and regular rhythm  Pulmonary:      Effort: Pulmonary effort is normal       Breath sounds: Normal breath sounds  Musculoskeletal:         General: Normal range of motion  Cervical back: Normal range of motion  Lymphadenopathy:      Cervical: No cervical adenopathy  Skin:     General: Skin is warm and dry  Neurological:      General: No focal deficit present  Mental Status: He is alert and oriented to person, place, and time  Mental status is at baseline  Gait: Gait abnormal    Psychiatric:         Mood and Affect: Mood normal            RESULTS:    In chart    This note was created with voice recognition software  Phonic, grammatical and spelling errors may be present within the note as a result      "

## 2023-05-15 DIAGNOSIS — E55.9 VITAMIN D DEFICIENCY: ICD-10-CM

## 2023-05-15 RX ORDER — ERGOCALCIFEROL 1.25 MG/1
50000 CAPSULE ORAL WEEKLY
Qty: 4 CAPSULE | Refills: 0 | Status: SHIPPED | OUTPATIENT
Start: 2023-05-15 | End: 2023-08-01

## 2023-06-19 DIAGNOSIS — I10 PRIMARY HYPERTENSION: ICD-10-CM

## 2023-06-19 RX ORDER — LISINOPRIL 20 MG/1
20 TABLET ORAL DAILY
Qty: 30 TABLET | Refills: 3 | Status: SHIPPED | OUTPATIENT
Start: 2023-06-19 | End: 2023-06-21 | Stop reason: SDUPTHER

## 2023-06-21 ENCOUNTER — CLINICAL SUPPORT (OUTPATIENT)
Dept: INTERNAL MEDICINE CLINIC | Facility: CLINIC | Age: 58
End: 2023-06-21

## 2023-06-21 VITALS — DIASTOLIC BLOOD PRESSURE: 96 MMHG | SYSTOLIC BLOOD PRESSURE: 148 MMHG

## 2023-06-21 DIAGNOSIS — I10 PRIMARY HYPERTENSION: ICD-10-CM

## 2023-06-21 DIAGNOSIS — Z01.30 BP CHECK: Primary | ICD-10-CM

## 2023-06-21 RX ORDER — LISINOPRIL 20 MG/1
20 TABLET ORAL DAILY
Qty: 30 TABLET | Refills: 3 | Status: SHIPPED | OUTPATIENT
Start: 2023-06-21

## 2023-06-23 ENCOUNTER — CLINICAL SUPPORT (OUTPATIENT)
Dept: INTERNAL MEDICINE CLINIC | Facility: CLINIC | Age: 58
End: 2023-06-23

## 2023-06-23 VITALS
HEART RATE: 63 BPM | DIASTOLIC BLOOD PRESSURE: 84 MMHG | SYSTOLIC BLOOD PRESSURE: 138 MMHG | OXYGEN SATURATION: 97 % | TEMPERATURE: 98.6 F

## 2023-06-23 DIAGNOSIS — E55.9 VITAMIN D DEFICIENCY: ICD-10-CM

## 2023-06-23 RX ORDER — ERGOCALCIFEROL 1.25 MG/1
50000 CAPSULE ORAL WEEKLY
Qty: 4 CAPSULE | Refills: 0 | Status: SHIPPED | OUTPATIENT
Start: 2023-06-23 | End: 2023-09-09

## 2023-06-23 NOTE — TELEPHONE ENCOUNTER
Reason for Visit    Blood Pressure Check    Reason for Visit History      Vitals  Most recent update: 6/23/2023  9:59 AM  BP   138/84    Pulse   63    Temp   98 6 °F (37 °C)    SpO2   97%

## 2023-07-14 ENCOUNTER — CLINICAL SUPPORT (OUTPATIENT)
Dept: INTERNAL MEDICINE CLINIC | Facility: CLINIC | Age: 58
End: 2023-07-14

## 2023-07-14 VITALS
SYSTOLIC BLOOD PRESSURE: 124 MMHG | HEART RATE: 68 BPM | DIASTOLIC BLOOD PRESSURE: 78 MMHG | BODY MASS INDEX: 44.91 KG/M2 | OXYGEN SATURATION: 95 % | HEIGHT: 70 IN | RESPIRATION RATE: 16 BRPM

## 2023-07-14 DIAGNOSIS — I10 PRIMARY HYPERTENSION: Primary | ICD-10-CM

## 2023-07-20 DIAGNOSIS — E55.9 VITAMIN D DEFICIENCY: ICD-10-CM

## 2023-07-20 RX ORDER — ERGOCALCIFEROL 1.25 MG/1
50000 CAPSULE ORAL WEEKLY
Qty: 4 CAPSULE | Refills: 0 | Status: SHIPPED | OUTPATIENT
Start: 2023-07-20

## 2023-07-22 LAB
LEFT EYE DIABETIC RETINOPATHY: POSITIVE
RIGHT EYE DIABETIC RETINOPATHY: POSITIVE

## 2023-08-18 DIAGNOSIS — E55.9 VITAMIN D DEFICIENCY: ICD-10-CM

## 2023-08-21 RX ORDER — ERGOCALCIFEROL 1.25 MG/1
50000 CAPSULE ORAL WEEKLY
Qty: 4 CAPSULE | Refills: 2 | Status: CANCELLED | OUTPATIENT
Start: 2023-08-21

## 2023-08-25 LAB — HBA1C MFR BLD HPLC: 6.1 %

## 2023-08-26 LAB
25(OH)D3+25(OH)D2 SERPL-MCNC: 29.4 NG/ML (ref 30–100)
ALBUMIN SERPL-MCNC: 4 G/DL (ref 3.8–4.9)
ALBUMIN/GLOB SERPL: 1.8 {RATIO} (ref 1.2–2.2)
ALP SERPL-CCNC: 66 IU/L (ref 44–121)
ALT SERPL-CCNC: 18 IU/L (ref 0–44)
AST SERPL-CCNC: 17 IU/L (ref 0–40)
BASOPHILS # BLD AUTO: 0.1 X10E3/UL (ref 0–0.2)
BASOPHILS NFR BLD AUTO: 1 %
BILIRUB SERPL-MCNC: 0.5 MG/DL (ref 0–1.2)
BUN SERPL-MCNC: 12 MG/DL (ref 8–27)
BUN/CREAT SERPL: 15 (ref 10–24)
CALCIUM SERPL-MCNC: 8.9 MG/DL (ref 8.6–10.2)
CHLORIDE SERPL-SCNC: 106 MMOL/L (ref 96–106)
CHOLEST SERPL-MCNC: 153 MG/DL (ref 100–199)
CO2 SERPL-SCNC: 22 MMOL/L (ref 20–29)
CREAT SERPL-MCNC: 0.79 MG/DL (ref 0.76–1.27)
EGFR: 102 ML/MIN/1.73
EOSINOPHIL # BLD AUTO: 0.2 X10E3/UL (ref 0–0.4)
EOSINOPHIL NFR BLD AUTO: 3 %
ERYTHROCYTE [DISTWIDTH] IN BLOOD BY AUTOMATED COUNT: 13 % (ref 11.6–15.4)
GLOBULIN SER-MCNC: 2.2 G/DL (ref 1.5–4.5)
GLUCOSE SERPL-MCNC: 103 MG/DL (ref 70–99)
HBA1C MFR BLD: 6.1 % (ref 4.8–5.6)
HCT VFR BLD AUTO: 42.1 % (ref 37.5–51)
HDLC SERPL-MCNC: 54 MG/DL
HGB BLD-MCNC: 13.9 G/DL (ref 13–17.7)
IMM GRANULOCYTES # BLD: 0 X10E3/UL (ref 0–0.1)
IMM GRANULOCYTES NFR BLD: 0 %
LDLC SERPL CALC-MCNC: 87 MG/DL (ref 0–99)
LYMPHOCYTES # BLD AUTO: 2.3 X10E3/UL (ref 0.7–3.1)
LYMPHOCYTES NFR BLD AUTO: 40 %
MCH RBC QN AUTO: 30.2 PG (ref 26.6–33)
MCHC RBC AUTO-ENTMCNC: 33 G/DL (ref 31.5–35.7)
MCV RBC AUTO: 92 FL (ref 79–97)
MONOCYTES # BLD AUTO: 0.5 X10E3/UL (ref 0.1–0.9)
MONOCYTES NFR BLD AUTO: 8 %
NEUTROPHILS # BLD AUTO: 2.8 X10E3/UL (ref 1.4–7)
NEUTROPHILS NFR BLD AUTO: 48 %
PLATELET # BLD AUTO: 250 X10E3/UL (ref 150–450)
POTASSIUM SERPL-SCNC: 4.2 MMOL/L (ref 3.5–5.2)
PROT SERPL-MCNC: 6.2 G/DL (ref 6–8.5)
PSA FREE MFR SERPL: 27.5 %
PSA FREE SERPL-MCNC: 0.11 NG/ML
PSA SERPL-MCNC: 0.4 NG/ML (ref 0–4)
RBC # BLD AUTO: 4.6 X10E6/UL (ref 4.14–5.8)
SODIUM SERPL-SCNC: 140 MMOL/L (ref 134–144)
TRIGL SERPL-MCNC: 58 MG/DL (ref 0–149)
TSH SERPL DL<=0.005 MIU/L-ACNC: 0.98 UIU/ML (ref 0.45–4.5)
WBC # BLD AUTO: 5.8 X10E3/UL (ref 3.4–10.8)

## 2023-09-01 ENCOUNTER — OFFICE VISIT (OUTPATIENT)
Dept: INTERNAL MEDICINE CLINIC | Facility: CLINIC | Age: 58
End: 2023-09-01
Payer: COMMERCIAL

## 2023-09-01 VITALS
HEIGHT: 70 IN | BODY MASS INDEX: 45.1 KG/M2 | SYSTOLIC BLOOD PRESSURE: 124 MMHG | OXYGEN SATURATION: 98 % | WEIGHT: 315 LBS | DIASTOLIC BLOOD PRESSURE: 80 MMHG | HEART RATE: 63 BPM

## 2023-09-01 DIAGNOSIS — L43.9 LICHEN PLANUS: ICD-10-CM

## 2023-09-01 DIAGNOSIS — I10 PRIMARY HYPERTENSION: ICD-10-CM

## 2023-09-01 DIAGNOSIS — E66.01 MORBID OBESITY WITH BMI OF 40.0-44.9, ADULT (HCC): ICD-10-CM

## 2023-09-01 DIAGNOSIS — E11.21 CONTROLLED TYPE 2 DIABETES MELLITUS WITH DIABETIC NEPHROPATHY, WITHOUT LONG-TERM CURRENT USE OF INSULIN (HCC): Primary | ICD-10-CM

## 2023-09-01 DIAGNOSIS — E66.01 OBESITY, CLASS III, BMI 40-49.9 (MORBID OBESITY) (HCC): ICD-10-CM

## 2023-09-01 PROBLEM — Z71.89 EDUCATED ABOUT COVID-19 VIRUS INFECTION: Status: RESOLVED | Noted: 2020-07-06 | Resolved: 2023-09-01

## 2023-09-01 PROCEDURE — 99214 OFFICE O/P EST MOD 30 MIN: CPT | Performed by: INTERNAL MEDICINE

## 2023-09-01 RX ORDER — CLOBETASOL PROPIONATE 0.5 MG/G
OINTMENT TOPICAL 2 TIMES DAILY
Qty: 60 G | Refills: 1 | Status: SHIPPED | OUTPATIENT
Start: 2023-09-01

## 2023-09-01 NOTE — PROGRESS NOTES
Assessment/Plan:      Here for routine f/u, reports he is having trouble with getting down to weight management, would like to restart ozempic and stop victozia, ozempic sent today; discussed diet changes and increasing physical activity; Labs reviewed, a1c slightly higher than last but at goal; will recheck labs in 6 months, but f/u in 3 months;     Type 2 DM, continue statin; on lisinopril; eye exam done and records requested. Quality Measures:     BMI Counseling: There is no height or weight on file to calculate BMI. The BMI is above normal. Nutrition recommendations include decreasing portion sizes and encouraging healthy choices of fruits and vegetables. Exercise recommendations include moderate physical activity 150 minutes/week. Rationale for BMI follow-up plan is due to patient being overweight or obese. Depression Screening and Follow-up Plan: Clincally patient does not have depression. No treatment is required. Tobacco Cessation Counseling: Tobacco cessation counseling was provided. The patient is sincerely urged to quit consumption of tobacco. He is not ready to quit tobacco.          Return in about 3 months (around 12/1/2023). No problem-specific Assessment & Plan notes found for this encounter. Diagnoses and all orders for this visit:    Controlled type 2 diabetes mellitus with diabetic nephropathy, without long-term current use of insulin (HCC)  -     Albumin / creatinine urine ratio; Future  -     Comprehensive metabolic panel; Future  -     Hemoglobin A1C; Future  -     CBC and differential; Future  -     Lipid Panel with Direct LDL reflex; Future  -     TSH, 3rd generation with Free T4 reflex; Future  -     semaglutide, 0.25 or 0.5 mg/dose, (Ozempic, 0.25 or 0.5 MG/DOSE,) 2 mg/3 mL injection pen;  Inject 0.75 mL (0.5 mg total) under the skin every 7 days for 4 doses    Obesity, Class III, BMI 40-49.9 (morbid obesity) ([address])    Primary hypertension    Morbid obesity with BMI of 40.0-44.9, adult [facility])          Subjective:      Patient ID: Nory Horvath is a 59 y.o. male.     Here for follow up;      ALLERGIES:  Allergies   Allergen Reactions   • Prunus Persica Anaphylaxis     Also frankie, plums, apricot, nectarine       CURRENT MEDICATIONS:    Current Outpatient Medications:   •  atorvastatin (LIPITOR) 40 mg tablet, Take 1 tablet (40 mg total) by mouth daily, Disp: 30 tablet, Rfl: 5  •  CALCIUM CITRATE PO, Take by mouth, Disp: , Rfl:   •  Cholecalciferol (VITAMIN D3) 2000 units capsule, Take 1 tablet by mouth daily, Disp: , Rfl:   •  clobetasol (TEMOVATE) 0.05 % ointment, Apply topically 2 (two) times a day, Disp: 60 g, Rfl: 1  •  Cyanocobalamin (VITAMIN B-12) 5000 MCG SUBL, Place under the tongue, Disp: , Rfl:   •  ergocalciferol (VITAMIN D2) 50,000 units, Take 1 capsule (50,000 Units total) by mouth once a week, Disp: 4 capsule, Rfl: 0  •  ferrous sulfate 324 (65 Fe) mg, Take 1 tablet (324 mg total) by mouth 2 (two) times a day before meals, Disp: 90 tablet, Rfl: 2  •  Insulin Pen Needle (Pen Needles) 32G X 4 MM MISC, Use in the morning To use with Victoza, Disp: 30 each, Rfl: 3  •  lisinopril (ZESTRIL) 20 mg tablet, Take 1 tablet (20 mg total) by mouth daily, Disp: 30 tablet, Rfl: 3  •  Multiple Vitamin (MULTI-VITAMIN DAILY) TABS, Take by mouth, Disp: , Rfl:   •  semaglutide, 0.25 or 0.5 mg/dose, (Ozempic, 0.25 or 0.5 MG/DOSE,) 2 mg/3 mL injection pen, Inject 0.75 mL (0.5 mg total) under the skin every 7 days for 4 doses, Disp: 3 mL, Rfl: 0    Current Facility-Administered Medications:   •  cyanocobalamin injection 1,000 mcg, 1,000 mcg, Intramuscular, Q30 Days, [place] NEAL Chung, 1,000 mcg at 04/25/19 1106    ACTIVE PROBLEM LIST:  Patient Active Problem List   Diagnosis   • Osteoarthritis of first metatarsophalangeal joint   • Ventral hernia   • Umbilical hernia   • Vitamin D deficiency   • Lichen planus   • Postgastrectomy malabsorption   • Seborrheic keratoses, inflamed   • Vitamin B12 deficiency   • Obesity, Class III, BMI 40-49.9 (morbid obesity) ([address])   • At risk for obstructive sleep apnea   • Encounter for surgical aftercare following surgery of digestive system   • Screening for malignant neoplasm of colon   • Screening for malignant neoplasm of prostate   • Controlled type 2 diabetes mellitus with diabetic nephropathy, without long-term current use of insulin ([address])       PAST MEDICAL HISTORY:  Past Medical History:   Diagnosis Date   • Allergic     [place] [facility]   • Arthritis     Last assessed: 12   • Diabetes mellitus ([address]) 07    Off and on for 15Years   • History of bariatric surgery    • Hypertension 20   • Morbid obesity due to excess calories ([address])     Last assessed: 12   • Obesity    • Postsurgical malabsorption    • Type 2 diabetes mellitus ([address])     Last assessed: 6/5/15   • Vitamin B1 deficiency     Last assessed: 14   • Vitamin D deficiency     Last assessed: 14       PAST SURGICAL HISTORY:  Past Surgical History:   Procedure Laterality Date   • GASTRIC BYPASS      For Morbid Obesity. Managed by: Mandy Levy.  Last assessed: 14   • HERNIA REPAIR         FAMILY HISTORY:  Family History   Problem Relation Age of Onset   • Heart failure Mother         Congestive   • Hypertension Mother    • Heart disease Mother    • Bone cancer Father    • Throat cancer Father         Laryngeal   • Diabetes Neg Hx    • Thyroid disease Neg Hx    • Stroke Neg Hx        SOCIAL HISTORY:  Social History     Socioeconomic History   • Marital status: /Civil Union     Spouse name: Not on file   • Number of children: Not on file   • Years of education: Not on file   • Highest education level: Not on file   Occupational History   • Not on file   Tobacco Use   • Smoking status: Some Days     Packs/day: 0.25     Years: 10.00     Total pack years: 2.50     Types: Cigarettes     Last attempt to quit: 2010     Years since quittin.4   • Smokeless tobacco: Never   • Tobacco comments:     occasionally every couple days   Vaping Use   • Vaping Use: Never used   Substance and Sexual Activity   • Alcohol use: Yes     Alcohol/week: 6.0 standard drinks of alcohol     Types: 2 Cans of beer, 4 Shots of liquor per week     Comment: Social   • Drug use: No   • Sexual activity: Not Currently     Partners: Female   Other Topics Concern   • Not on file   Social History Narrative   • Not on file     Social Determinants of Health     Financial Resource Strain: Not on file   Food Insecurity: Not on file   Transportation Needs: Not on file   Physical Activity: Not on file   Stress: Not on file   Social Connections: Not on file   Intimate Partner Violence: Not on file   Housing Stability: Not on file       Review of Systems   Constitutional: Negative for chills and fever. HENT: Negative for ear pain and sore throat. Eyes: Negative for pain and visual disturbance. Respiratory: Negative for cough and shortness of breath. Cardiovascular: Negative for chest pain and palpitations. Gastrointestinal: Negative for abdominal pain and vomiting. Genitourinary: Negative for dysuria and hematuria. Musculoskeletal: Negative for arthralgias and back pain. Skin: Negative for color change and rash. Neurological: Negative for seizures and syncope. All other systems reviewed and are negative. Objective:  Vitals:    09/01/23 0923   BP: 124/80   BP Location: Left arm   Patient Position: Sitting   Cuff Size: Adult   Pulse: 63   SpO2: 98%   Weight: (!) 144 kg (316 lb 12.8 oz)   Height: 5' 10" (1.778 m)     Body mass index is 45.46 kg/m². Physical Exam  Vitals and nursing note reviewed. Constitutional:       Appearance: Normal appearance. He is obese. HENT:      Head: Normocephalic and atraumatic. Cardiovascular:      Rate and Rhythm: Normal rate and regular rhythm. Heart sounds: Normal heart sounds.    Pulmonary:      Effort: Pulmonary effort is normal.      Breath sounds: Normal breath sounds. Musculoskeletal:         General: Normal range of motion. Cervical back: Normal range of motion. Lymphadenopathy:      Cervical: No cervical adenopathy. Skin:     General: Skin is warm and dry. Neurological:      General: No focal deficit present. Mental Status: He is alert and oriented to person, place, and time. Mental status is at baseline.    Psychiatric:         Mood and Affect: Mood normal.           RESULTS:  Hemoglobin A1C   Date/Time Value Ref Range Status   08/25/2023 08:09 AM 6.1 (H) 4.8 - 5.6 % Final     Comment:              Prediabetes: 5.7 - 6.4           Diabetes: >6.4           Glycemic control for adults with diabetes: <7.0       Cholesterol, Total   Date/Time Value Ref Range Status   08/25/2023 08:09  100 - 199 mg/dL Final     Triglycerides   Date/Time Value Ref Range Status   08/25/2023 08:09 AM 58 0 - 149 mg/dL Final     HDL   Date/Time Value Ref Range Status   08/25/2023 08:09 AM 54 >39 mg/dL Final     LDL Calculated   Date/Time Value Ref Range Status   08/25/2023 08:09 AM 87 0 - 99 mg/dL Final     Hemoglobin   Date/Time Value Ref Range Status   08/25/2023 08:09 AM 13.9 13.0 - 17.7 g/dL Final   01/04/2023 10:32 AM 15.0 12.0 - 17.0 g/dL Final   09/26/2017 10:36 AM 13.5 12.6 - 17.7 g/dL Final     HCT   Date/Time Value Ref Range Status   08/25/2023 08:09 AM 42.1 37.5 - 51.0 % Final     Hematocrit   Date/Time Value Ref Range Status   01/04/2023 10:32 AM 45.3 36.5 - 49.3 % Final   09/26/2017 10:36 AM 41.8 37.5 - 51.0 % Final     Platelet Count   Date/Time Value Ref Range Status   08/25/2023 08:09  150 - 450 x10E3/uL Final     Platelets   Date/Time Value Ref Range Status   01/04/2023 10:32  149 - 390 Thousands/uL Final   09/26/2017 10:36  150 - 379 x10E3/uL Final     Comment:     Performed at: [facility], [address] [place] [address], , [place], [place], [id number], [id number]  MD:  [address] [phone]       Prostate Specific Antigen Total   Date/Time Value Ref Range Status   08/25/2023 08:09 AM 0.4 0.0 - 4.0 ng/mL Final     Comment:     Roche ECLIA methodology. According to the American Urological Association, Serum PSA should  decrease and remain at undetectable levels after radical  prostatectomy. The AUA defines biochemical recurrence as an initial  PSA value 0.2 ng/mL or greater followed by a subsequent confirmatory  PSA value 0.2 ng/mL or greater. Values obtained with different assay methods or kits cannot be used  interchangeably. Results cannot be interpreted as absolute evidence  of the presence or absence of malignant disease. TSH 3RD GENERATON   Date/Time Value Ref Range Status   05/30/2017 11:06 AM 2.250 0.450 - 4.500 uIU/mL Final     Comment:       Performed at: [address], [id number], [id number]  MD:  [address] [phone]       Sodium   Date/Time Value Ref Range Status   08/25/2023 08:09  134 - 144 mmol/L Final     BUN   Date/Time Value Ref Range Status   08/25/2023 08:09 AM 12 8 - 27 mg/dL Final     Creatinine   Date/Time Value Ref Range Status   08/25/2023 08:09 AM 0.79 0.76 - 1.27 mg/dL Final   01/04/2023 10:32 AM 0.92 0.60 - 1.30 mg/dL Final     Comment:     Standardized to IDMS reference method   09/26/2017 10:36 AM 0.74 (L) 0.76 - 1.27 mg/dL Final      In chart    This note was created with voice recognition software. Phonic, grammatical and spelling errors may be present within the note as a result.

## 2023-09-11 LAB
LEFT EYE DIABETIC RETINOPATHY: POSITIVE
RIGHT EYE DIABETIC RETINOPATHY: POSITIVE

## 2023-10-05 DIAGNOSIS — E55.9 VITAMIN D DEFICIENCY: ICD-10-CM

## 2023-10-05 DIAGNOSIS — E11.21 CONTROLLED TYPE 2 DIABETES MELLITUS WITH DIABETIC NEPHROPATHY, WITHOUT LONG-TERM CURRENT USE OF INSULIN (HCC): Primary | ICD-10-CM

## 2023-10-05 RX ORDER — ACETAMINOPHEN 160 MG
2000 TABLET,DISINTEGRATING ORAL DAILY
Status: CANCELLED | OUTPATIENT
Start: 2023-10-05

## 2023-10-05 RX ORDER — ERGOCALCIFEROL 1.25 MG/1
50000 CAPSULE ORAL WEEKLY
Qty: 4 CAPSULE | Refills: 0 | Status: CANCELLED | OUTPATIENT
Start: 2023-10-05

## 2023-10-05 NOTE — TELEPHONE ENCOUNTER
Patient is requesting refills on ergocalciferol (Vitamin D2) 50,000 units, take 1 capsule (50,000 units total) by mouth once a week; semaglutide, 0.25 or 0.5 mg/dose, (loemic, 0.25 or 0.5 MG/DOSE) 2 mg/3 mL injection pen, inject 0.75 mL (0.5 mg total) under the skin every 7 days for 4 doses.      Joby Barajas back #[phone]

## 2023-10-06 RX ORDER — ERGOCALCIFEROL 1.25 MG/1
50000 CAPSULE ORAL WEEKLY
Qty: 4 CAPSULE | Refills: 0 | Status: SHIPPED | OUTPATIENT
Start: 2023-10-06

## 2023-10-30 DIAGNOSIS — E55.9 VITAMIN D DEFICIENCY: ICD-10-CM

## 2023-10-30 RX ORDER — ERGOCALCIFEROL 1.25 MG/1
50000 CAPSULE ORAL WEEKLY
Qty: 4 CAPSULE | Refills: 0 | Status: SHIPPED | OUTPATIENT
Start: 2023-10-30

## 2023-11-29 DIAGNOSIS — E55.9 VITAMIN D DEFICIENCY: ICD-10-CM

## 2023-11-29 RX ORDER — ERGOCALCIFEROL 1.25 MG/1
50000 CAPSULE ORAL WEEKLY
Qty: 4 CAPSULE | Refills: 0 | Status: SHIPPED | OUTPATIENT
Start: 2023-11-29

## 2023-11-30 DIAGNOSIS — E11.21 CONTROLLED TYPE 2 DIABETES MELLITUS WITH DIABETIC NEPHROPATHY, WITHOUT LONG-TERM CURRENT USE OF INSULIN (HCC): ICD-10-CM

## 2023-11-30 NOTE — TELEPHONE ENCOUNTER
Patient is requesting a refill on semaglutide 0.25 or 0.5 mg/dose 2 mg/1.5 mL injection pen, inject 0.38 mL (0.5 mg total) under the skin every 7 days. [facility]ga [facility]    Also, printed labs some were to do expected 12/01/2023 and some were expected date 02/28/2023. Please clarify if all labs should be done for next visit. He had to reschedule it to Jan 8, 2024, due to change in hours at work. Please call patient and let him know.       Call back #[phone]

## 2023-12-07 DIAGNOSIS — E11.21 CONTROLLED TYPE 2 DIABETES MELLITUS WITH DIABETIC NEPHROPATHY, WITHOUT LONG-TERM CURRENT USE OF INSULIN (HCC): ICD-10-CM

## 2023-12-07 RX ORDER — ATORVASTATIN CALCIUM 40 MG/1
40 TABLET, FILM COATED ORAL DAILY
Qty: 30 TABLET | Refills: 5 | Status: SHIPPED | OUTPATIENT
Start: 2023-12-07

## 2023-12-24 LAB
ALBUMIN SERPL-MCNC: 4 G/DL (ref 3.8–4.9)
ALBUMIN/GLOB SERPL: 1.5 {RATIO} (ref 1.2–2.2)
ALP SERPL-CCNC: 77 IU/L (ref 44–121)
ALT SERPL-CCNC: 23 IU/L (ref 0–44)
AST SERPL-CCNC: 20 IU/L (ref 0–40)
BASOPHILS # BLD AUTO: 0 X10E3/UL (ref 0–0.2)
BASOPHILS NFR BLD AUTO: 1 %
BILIRUB SERPL-MCNC: 0.4 MG/DL (ref 0–1.2)
BUN SERPL-MCNC: 9 MG/DL (ref 8–27)
BUN/CREAT SERPL: 10 (ref 10–24)
CALCIUM SERPL-MCNC: 9.2 MG/DL (ref 8.6–10.2)
CHLORIDE SERPL-SCNC: 104 MMOL/L (ref 96–106)
CO2 SERPL-SCNC: 24 MMOL/L (ref 20–29)
CREAT SERPL-MCNC: 0.91 MG/DL (ref 0.76–1.27)
EGFR: 96 ML/MIN/1.73
EOSINOPHIL # BLD AUTO: 0.1 X10E3/UL (ref 0–0.4)
EOSINOPHIL NFR BLD AUTO: 3 %
ERYTHROCYTE [DISTWIDTH] IN BLOOD BY AUTOMATED COUNT: 12.3 % (ref 11.6–15.4)
GLOBULIN SER-MCNC: 2.6 G/DL (ref 1.5–4.5)
GLUCOSE SERPL-MCNC: 110 MG/DL (ref 70–99)
HBA1C MFR BLD: 6 % (ref 4.8–5.6)
HCT VFR BLD AUTO: 40.2 % (ref 37.5–51)
HGB BLD-MCNC: 13.3 G/DL (ref 13–17.7)
IMM GRANULOCYTES # BLD: 0 X10E3/UL (ref 0–0.1)
IMM GRANULOCYTES NFR BLD: 0 %
LYMPHOCYTES # BLD AUTO: 2 X10E3/UL (ref 0.7–3.1)
LYMPHOCYTES NFR BLD AUTO: 35 %
MCH RBC QN AUTO: 30.9 PG (ref 26.6–33)
MCHC RBC AUTO-ENTMCNC: 33.1 G/DL (ref 31.5–35.7)
MCV RBC AUTO: 93 FL (ref 79–97)
MONOCYTES # BLD AUTO: 0.5 X10E3/UL (ref 0.1–0.9)
MONOCYTES NFR BLD AUTO: 9 %
NEUTROPHILS # BLD AUTO: 3 X10E3/UL (ref 1.4–7)
NEUTROPHILS NFR BLD AUTO: 52 %
PLATELET # BLD AUTO: 247 X10E3/UL (ref 150–450)
POTASSIUM SERPL-SCNC: 4.2 MMOL/L (ref 3.5–5.2)
PROT SERPL-MCNC: 6.6 G/DL (ref 6–8.5)
RBC # BLD AUTO: 4.31 X10E6/UL (ref 4.14–5.8)
SODIUM SERPL-SCNC: 139 MMOL/L (ref 134–144)
WBC # BLD AUTO: 5.7 X10E3/UL (ref 3.4–10.8)

## 2023-12-25 DIAGNOSIS — E55.9 VITAMIN D DEFICIENCY: ICD-10-CM

## 2023-12-26 RX ORDER — ERGOCALCIFEROL 1.25 MG/1
50000 CAPSULE ORAL WEEKLY
Qty: 4 CAPSULE | Refills: 0 | Status: SHIPPED | OUTPATIENT
Start: 2023-12-26

## 2024-01-08 ENCOUNTER — OFFICE VISIT (OUTPATIENT)
Dept: INTERNAL MEDICINE CLINIC | Facility: CLINIC | Age: 59
End: 2024-01-08
Payer: COMMERCIAL

## 2024-01-08 VITALS
HEART RATE: 57 BPM | WEIGHT: 307 LBS | OXYGEN SATURATION: 96 % | BODY MASS INDEX: 43.95 KG/M2 | SYSTOLIC BLOOD PRESSURE: 130 MMHG | HEIGHT: 70 IN | DIASTOLIC BLOOD PRESSURE: 84 MMHG

## 2024-01-08 DIAGNOSIS — K91.2 POSTGASTRECTOMY MALABSORPTION: Primary | ICD-10-CM

## 2024-01-08 DIAGNOSIS — I10 PRIMARY HYPERTENSION: ICD-10-CM

## 2024-01-08 DIAGNOSIS — E55.9 VITAMIN D DEFICIENCY: ICD-10-CM

## 2024-01-08 DIAGNOSIS — E11.21 CONTROLLED TYPE 2 DIABETES MELLITUS WITH DIABETIC NEPHROPATHY, WITHOUT LONG-TERM CURRENT USE OF INSULIN (HCC): ICD-10-CM

## 2024-01-08 DIAGNOSIS — Z90.3 POSTGASTRECTOMY MALABSORPTION: Primary | ICD-10-CM

## 2024-01-08 PROCEDURE — 99214 OFFICE O/P EST MOD 30 MIN: CPT | Performed by: INTERNAL MEDICINE

## 2024-01-08 RX ORDER — LISINOPRIL 20 MG/1
20 TABLET ORAL DAILY
Qty: 30 TABLET | Refills: 3 | Status: SHIPPED | OUTPATIENT
Start: 2024-01-08 | End: 2024-01-08 | Stop reason: SDUPTHER

## 2024-01-08 RX ORDER — LISINOPRIL 20 MG/1
20 TABLET ORAL DAILY
Qty: 30 TABLET | Refills: 3 | Status: SHIPPED | OUTPATIENT
Start: 2024-01-08

## 2024-01-08 NOTE — PROGRESS NOTES
Assessment/Plan:     Patient is here for routine follow up, reviewed chronic medical problems, ordered labs for next visit including CBC CMP TSH A1C LIPID. Reviewed labs for this visit.    Type 2 DM  is at goal, continue current regimen, weight loss noted, increase ozempic to 1 mg; eye exam done at [facility], will obtain records. Labs next visit.    HTN stable, continue lisinopril.       Quality Measures:       Depression Screening and Follow-up Plan: Clincally patient does not have depression. No treatment is required.     Tobacco Cessation Counseling: Tobacco cessation counseling was provided. The patient is sincerely urged to quit consumption of tobacco. He is not ready to quit tobacco.        Return in about 3 months (around 4/8/2024).    No problem-specific Assessment & Plan notes found for this encounter.       Diagnoses and all orders for this visit:    Postgastrectomy malabsorption  -     Comprehensive metabolic panel; Future    Controlled type 2 diabetes mellitus with diabetic nephropathy, without long-term current use of insulin (HCC)  -     Albumin / creatinine urine ratio; Future  -     Comprehensive metabolic panel; Future  -     Hemoglobin A1C; Future  -     CBC and differential; Future  -     TSH, 3rd generation with Free T4 reflex; Future  -     Lipid Panel with Direct LDL reflex; Future  -     semaglutide, 1 mg/dose, (Ozempic) 4 mg/3 mL injection pen; Inject 0.75 mL (1 mg total) under the skin once a week for 8 doses    Primary hypertension  -     lisinopril (ZESTRIL) 20 mg tablet; Take 1 tablet (20 mg total) by mouth daily    Vitamin D deficiency  -     Vitamin D 25 hydroxy; Future          Subjective:      Patient ID: Guille LOJA [place] is a 58 y.o. male.    Here for follow up.      ALLERGIES:  Allergies   Allergen Reactions   • Prunus Persica Anaphylaxis     Also frankie, plums, apricot, nectarine       CURRENT MEDICATIONS:    Current Outpatient Medications:   •  atorvastatin (LIPITOR) 40 mg  tablet, Take 1 tablet (40 mg total) by mouth daily, Disp: 30 tablet, Rfl: 5  •  CALCIUM CITRATE PO, Take by mouth, Disp: , Rfl:   •  Cholecalciferol (VITAMIN D3) 2000 units capsule, Take 1 tablet by mouth daily, Disp: , Rfl:   •  clobetasol (TEMOVATE) 0.05 % ointment, Apply topically 2 (two) times a day, Disp: 60 g, Rfl: 1  •  Cyanocobalamin (VITAMIN B-12) 5000 MCG SUBL, Place under the tongue, Disp: , Rfl:   •  ergocalciferol (VITAMIN D2) 50,000 units, take 1 capsule by mouth every week, Disp: 4 capsule, Rfl: 0  •  ferrous sulfate 324 (65 Fe) mg, Take 1 tablet (324 mg total) by mouth 2 (two) times a day before meals, Disp: 90 tablet, Rfl: 2  •  Insulin Pen Needle (Pen Needles) 32G X 4 MM MISC, Use in the morning To use with Victoza, Disp: 30 each, Rfl: 3  •  lisinopril (ZESTRIL) 20 mg tablet, Take 1 tablet (20 mg total) by mouth daily, Disp: 30 tablet, Rfl: 3  •  Multiple Vitamin (MULTI-VITAMIN DAILY) TABS, Take by mouth, Disp: , Rfl:   •  semaglutide, 1 mg/dose, (Ozempic) 4 mg/3 mL injection pen, Inject 0.75 mL (1 mg total) under the skin once a week for 8 doses, Disp: 6 mL, Rfl: 0    Current Facility-Administered Medications:   •  cyanocobalamin injection 1,000 mcg, 1,000 mcg, Intramuscular, Q30 Days, NEAL Wallis, 1,000 mcg at 04/25/19 1106    ACTIVE PROBLEM LIST:  Patient Active Problem List   Diagnosis   • Osteoarthritis of first metatarsophalangeal joint   • Ventral hernia   • Umbilical hernia   • Vitamin D deficiency   • Lichen planus   • Postgastrectomy malabsorption   • Seborrheic keratoses, inflamed   • Vitamin B12 deficiency   • Obesity, Class III, BMI 40-49.9 (morbid obesity) (HCC)   • At risk for obstructive sleep apnea   • Encounter for surgical aftercare following surgery of digestive system   • Screening for malignant neoplasm of colon   • Screening for malignant neoplasm of prostate   • Controlled type 2 diabetes mellitus with diabetic nephropathy, without long-term current use of insulin  (HCC)       PAST MEDICAL HISTORY:  Past Medical History:   Diagnosis Date   • Allergic     [place] [facility]   • Arthritis     Last assessed: 12   • Diabetes mellitus (HCC) 07    Off and on for 15Years   • History of bariatric surgery    • Hypertension 22   • Morbid obesity due to excess calories (HCC)     Last assessed: 12   • Obesity    • Postsurgical malabsorption    • Type 2 diabetes mellitus (HCC)     Last assessed: 6/5/15   • Vitamin B1 deficiency     Last assessed: 14   • Vitamin D deficiency     Last assessed: 14       PAST SURGICAL HISTORY:  Past Surgical History:   Procedure Laterality Date   • GASTRIC BYPASS      For Morbid Obesity. Managed by: José Miguel Deleon. Last assessed: 14   • HERNIA REPAIR         FAMILY HISTORY:  Family History   Problem Relation Age of Onset   • Heart failure Mother         Congestive   • Hypertension Mother    • Heart disease Mother    • Bone cancer Father    • Throat cancer Father         Laryngeal   • Diabetes Neg Hx    • Thyroid disease Neg Hx    • Stroke Neg Hx        SOCIAL HISTORY:  Social History     Socioeconomic History   • Marital status: /Civil Union     Spouse name: Not on file   • Number of children: Not on file   • Years of education: Not on file   • Highest education level: Not on file   Occupational History   • Not on file   Tobacco Use   • Smoking status: Some Days     Current packs/day: 0.00     Average packs/day: 0.3 packs/day for 10.0 years (2.5 ttl pk-yrs)     Types: Cigarettes     Start date: 2000     Last attempt to quit: 2010     Years since quittin.7   • Smokeless tobacco: Never   • Tobacco comments:     occasionally every couple days   Vaping Use   • Vaping status: Never Used   Substance and Sexual Activity   • Alcohol use: Yes     Alcohol/week: 6.0 standard drinks of alcohol     Types: 2 Cans of beer, 4 Shots of liquor per week     Comment: Social   • Drug use: No   • Sexual activity: Not Currently  "    Partners: Female   Other Topics Concern   • Not on file   Social History Narrative   • Not on file     Social Determinants of Health     Financial Resource Strain: Not on file   Food Insecurity: Not on file   Transportation Needs: Not on file   Physical Activity: Not on file   Stress: Not on file   Social Connections: Not on file   Intimate Partner Violence: Not on file   Housing Stability: Not on file       Review of Systems   Constitutional:  Negative for chills and fever.   HENT:  Negative for ear pain and sore throat.    Eyes:  Negative for pain and visual disturbance.   Respiratory:  Negative for cough and shortness of breath.    Cardiovascular:  Negative for chest pain and palpitations.   Gastrointestinal:  Negative for abdominal pain and vomiting.   Genitourinary:  Negative for dysuria and hematuria.   Musculoskeletal:  Negative for arthralgias and back pain.   Skin:  Negative for color change and rash.   Neurological:  Positive for dizziness. Negative for seizures and syncope.   All other systems reviewed and are negative.        Objective:  Vitals:    01/08/24 0929   BP: 130/84   BP Location: Right arm   Patient Position: Sitting   Cuff Size: Large   Pulse: 57   SpO2: 96%   Weight: (!) 139 kg (307 lb)   Height: 5' 10\" (1.778 m)     Body mass index is 44.05 kg/m².     Physical Exam  Vitals and nursing note reviewed.   Constitutional:       Appearance: Normal appearance. He is obese.   HENT:      Head: Normocephalic and atraumatic.   Cardiovascular:      Rate and Rhythm: Normal rate and regular rhythm.      Heart sounds: Normal heart sounds.   Pulmonary:      Effort: Pulmonary effort is normal.      Breath sounds: Normal breath sounds.   Musculoskeletal:         General: Normal range of motion.      Cervical back: Normal range of motion.   Lymphadenopathy:      Cervical: No cervical adenopathy.   Skin:     General: Skin is warm and dry.   Neurological:      General: No focal deficit present.      Mental " Status: He is alert and oriented to person, place, and time. Mental status is at baseline.   Psychiatric:         Mood and Affect: Mood normal.         RESULTS:  Hemoglobin A1C   Date/Time Value Ref Range Status   12/23/2023 08:06 AM 6.0 (H) 4.8 - 5.6 % Final     Comment:              Prediabetes: 5.7 - 6.4           Diabetes: >6.4           Glycemic control for adults with diabetes: <7.0       Cholesterol, Total   Date/Time Value Ref Range Status   08/25/2023 08:09  100 - 199 mg/dL Final     Triglycerides   Date/Time Value Ref Range Status   08/25/2023 08:09 AM 58 0 - 149 mg/dL Final     HDL   Date/Time Value Ref Range Status   08/25/2023 08:09 AM 54 >39 mg/dL Final     LDL Calculated   Date/Time Value Ref Range Status   08/25/2023 08:09 AM 87 0 - 99 mg/dL Final     Hemoglobin   Date/Time Value Ref Range Status   12/23/2023 08:06 AM 13.3 13.0 - 17.7 g/dL Final   01/04/2023 10:32 AM 15.0 12.0 - 17.0 g/dL Final   09/26/2017 10:36 AM 13.5 12.6 - 17.7 g/dL Final     HCT   Date/Time Value Ref Range Status   12/23/2023 08:06 AM 40.2 37.5 - 51.0 % Final     Hematocrit   Date/Time Value Ref Range Status   01/04/2023 10:32 AM 45.3 36.5 - 49.3 % Final   09/26/2017 10:36 AM 41.8 37.5 - 51.0 % Final     Platelet Count   Date/Time Value Ref Range Status   12/23/2023 08:06  150 - 450 x10E3/uL Final     Platelets   Date/Time Value Ref Range Status   01/04/2023 10:32  149 - 390 Thousands/uL Final   09/26/2017 10:36  150 - 379 x10E3/uL Final     Comment:     Performed at: [facility], [address] [place] [address], [place], [place], [id number], [id number]  MD:  [address] [place] [phone]       Prostate Specific Antigen Total   Date/Time Value Ref Range Status   08/25/2023 08:09 AM 0.4 0.0 - 4.0 ng/mL Final     Comment:     Roche ECLIA methodology.  According to the American Urological Association, Serum PSA should  decrease and remain at undetectable levels after radical  prostatectomy. The AUA defines  biochemical recurrence as an initial  PSA value 0.2 ng/mL or greater followed by a subsequent confirmatory  PSA value 0.2 ng/mL or greater.  Values obtained with different assay methods or kits cannot be used  interchangeably. Results cannot be interpreted as absolute evidence  of the presence or absence of malignant disease.       TSH 3RD GENERATON   Date/Time Value Ref Range Status   05/30/2017 11:06 AM 2.250 0.450 - 4.500 uIU/mL Final     Comment:       Performed at: [facility], [address] [place] [address], [place], [place], [id number], [id number]  MD:  [address] [place] [phone]       Sodium   Date/Time Value Ref Range Status   12/23/2023 08:06  134 - 144 mmol/L Final     BUN   Date/Time Value Ref Range Status   12/23/2023 08:06 AM 9 8 - 27 mg/dL Final     Creatinine   Date/Time Value Ref Range Status   12/23/2023 08:06 AM 0.91 0.76 - 1.27 mg/dL Final   01/04/2023 10:32 AM 0.92 0.60 - 1.30 mg/dL Final     Comment:     Standardized to IDMS reference method   09/26/2017 10:36 AM 0.74 (L) 0.76 - 1.27 mg/dL Final      In chart    This note was created with voice recognition software.  Phonic, grammatical and spelling errors may be present within the note as a result.

## 2024-01-18 DIAGNOSIS — E55.9 VITAMIN D DEFICIENCY: Primary | ICD-10-CM

## 2024-01-18 RX ORDER — ACETAMINOPHEN 160 MG
2000 TABLET,DISINTEGRATING ORAL DAILY
Qty: 30 CAPSULE | Refills: 2 | Status: SHIPPED | OUTPATIENT
Start: 2024-01-18

## 2024-02-21 PROBLEM — Z12.5 SCREENING FOR MALIGNANT NEOPLASM OF PROSTATE: Status: RESOLVED | Noted: 2020-07-17 | Resolved: 2024-02-21

## 2024-02-21 PROBLEM — Z12.11 SCREENING FOR MALIGNANT NEOPLASM OF COLON: Status: RESOLVED | Noted: 2020-07-17 | Resolved: 2024-02-21

## 2024-04-11 ENCOUNTER — OFFICE VISIT (OUTPATIENT)
Dept: INTERNAL MEDICINE CLINIC | Facility: CLINIC | Age: 59
End: 2024-04-11
Payer: COMMERCIAL

## 2024-04-11 VITALS
DIASTOLIC BLOOD PRESSURE: 78 MMHG | OXYGEN SATURATION: 98 % | BODY MASS INDEX: 43.67 KG/M2 | HEART RATE: 70 BPM | WEIGHT: 305 LBS | SYSTOLIC BLOOD PRESSURE: 120 MMHG | HEIGHT: 70 IN

## 2024-04-11 DIAGNOSIS — E55.9 VITAMIN D DEFICIENCY: ICD-10-CM

## 2024-04-11 DIAGNOSIS — E11.21 CONTROLLED TYPE 2 DIABETES MELLITUS WITH DIABETIC NEPHROPATHY, WITHOUT LONG-TERM CURRENT USE OF INSULIN (HCC): Primary | ICD-10-CM

## 2024-04-11 PROCEDURE — 99214 OFFICE O/P EST MOD 30 MIN: CPT | Performed by: INTERNAL MEDICINE

## 2024-04-11 RX ORDER — ACETAMINOPHEN 160 MG
2000 TABLET,DISINTEGRATING ORAL DAILY
Qty: 30 CAPSULE | Refills: 2 | Status: SHIPPED | OUTPATIENT
Start: 2024-04-11

## 2024-04-11 NOTE — PROGRESS NOTES
Assessment/Plan:     Patient is here for routine follow up, reviewed chronic medical problems, ordered labs for next visit including CBC CMP TSH A1C LIPID. Reviewed labs for this visit.     Type 2 DM  is at goal, continue current regimen, weight loss noted, increase ozempic to 1.5 mg; A1c next visit.     HTN stable, continue lisinopril.     Quality Measures:       Depression Screening and Follow-up Plan: Patient was screened for depression during today's encounter. They screened negative with a PHQ-2 score of 0.    Tobacco Cessation Counseling: Tobacco cessation counseling was provided. The patient is sincerely urged to quit consumption of tobacco. He is not ready to quit tobacco.          Return in about 3 months (around 7/11/2024).    No problem-specific Assessment & Plan notes found for this encounter.       Diagnoses and all orders for this visit:    Controlled type 2 diabetes mellitus with diabetic nephropathy, without long-term current use of insulin (HCC)  -     Albumin / creatinine urine ratio; Future  -     Comprehensive metabolic panel; Future  -     Hemoglobin A1C; Future  -     CBC and differential; Future  -     Lipid Panel with Direct LDL reflex; Future  -     TSH, 3rd generation with Free T4 reflex; Future  -     semaglutide, 1 mg/dose, (Ozempic, 1 MG/DOSE,) 4 mg/3 mL injection pen; Inject 1.13 mL (1.5 mg total) under the skin every 7 days    Vitamin D deficiency  -     Cholecalciferol (Vitamin D3) 50 MCG (2000 UT) CAPS; Take 1 capsule (2,000 Units total) by mouth daily          Subjective:      Patient ID: Guille Macias is a 58 y.o. male.    Patient is here for routine follow up, reviewed chronic medical problems, ordered labs for next visit including CBC CMP TSH A1C LIPID. Reviewed labs for this visit.        ALLERGIES:  Allergies   Allergen Reactions    Prunus Persica Anaphylaxis     Also frankie, plums, apricot, nectarine       CURRENT MEDICATIONS:    Current Outpatient Medications:      atorvastatin (LIPITOR) 40 mg tablet, Take 1 tablet (40 mg total) by mouth daily, Disp: 30 tablet, Rfl: 5    CALCIUM CITRATE PO, Take by mouth, Disp: , Rfl:     Cholecalciferol (Vitamin D3) 50 MCG (2000 UT) CAPS, Take 1 capsule (2,000 Units total) by mouth daily, Disp: 30 capsule, Rfl: 2    clobetasol (TEMOVATE) 0.05 % ointment, Apply topically 2 (two) times a day, Disp: 60 g, Rfl: 1    Cyanocobalamin (VITAMIN B-12) 5000 MCG SUBL, Place under the tongue, Disp: , Rfl:     ergocalciferol (VITAMIN D2) 50,000 units, take 1 capsule by mouth every week, Disp: 4 capsule, Rfl: 0    ferrous sulfate 324 (65 Fe) mg, Take 1 tablet (324 mg total) by mouth 2 (two) times a day before meals, Disp: 90 tablet, Rfl: 2    Insulin Pen Needle (Pen Needles) 32G X 4 MM MISC, Use in the morning To use with Victoza, Disp: 30 each, Rfl: 3    lisinopril (ZESTRIL) 20 mg tablet, Take 1 tablet (20 mg total) by mouth daily, Disp: 30 tablet, Rfl: 3    Multiple Vitamin (MULTI-VITAMIN DAILY) TABS, Take by mouth, Disp: , Rfl:     semaglutide, 1 mg/dose, (Ozempic, 1 MG/DOSE,) 4 mg/3 mL injection pen, Inject 1.13 mL (1.5 mg total) under the skin every 7 days, Disp: 9 mL, Rfl: 1    Current Facility-Administered Medications:     cyanocobalamin injection 1,000 mcg, 1,000 mcg, Intramuscular, Q30 Days, NEAL Wallis, 1,000 mcg at 04/25/19 1106    ACTIVE PROBLEM LIST:  Patient Active Problem List   Diagnosis    Osteoarthritis of first metatarsophalangeal joint    Ventral hernia    Umbilical hernia    Vitamin D deficiency    Lichen planus    Postgastrectomy malabsorption    Seborrheic keratoses, inflamed    Vitamin B12 deficiency    Obesity, Class III, BMI 40-49.9 (morbid obesity) ([facility])    At risk for obstructive sleep apnea    Encounter for surgical aftercare following surgery of digestive system    Controlled type 2 diabetes mellitus with diabetic nephropathy, without long-term current use of insulin ([facility])       PAST MEDICAL HISTORY:  Past Medical  History:   Diagnosis Date    Allergic     [place] [facility]    Arthritis     Last assessed: 12    Diabetes mellitus (HCC) 07    Off and on for 15Years    History of bariatric surgery     Hypertension 22    Morbid obesity due to excess calories (HCC)     Last assessed: 12    Obesity     Postsurgical malabsorption     Type 2 diabetes mellitus (HCC)     Last assessed: 6/5/15    Vitamin B1 deficiency     Last assessed: 14    Vitamin D deficiency     Last assessed: 14       PAST SURGICAL HISTORY:  Past Surgical History:   Procedure Laterality Date    GASTRIC BYPASS      For Morbid Obesity. Managed by: José Miguel Deleon. Last assessed: 14    HERNIA REPAIR         FAMILY HISTORY:  Family History   Problem Relation Age of Onset    Heart failure Mother         Congestive    Hypertension Mother     Heart disease Mother     Bone cancer Father     Throat cancer Father         Laryngeal    Diabetes Neg Hx     Thyroid disease Neg Hx     Stroke Neg Hx        SOCIAL HISTORY:  Social History     Socioeconomic History    Marital status: /Civil Union     Spouse name: Not on file    Number of children: Not on file    Years of education: Not on file    Highest education level: Not on file   Occupational History    Not on file   Tobacco Use    Smoking status: Some Days     Current packs/day: 0.00     Average packs/day: 0.3 packs/day for 10.0 years (2.5 ttl pk-yrs)     Types: Cigarettes     Start date: 2000     Last attempt to quit: 2010     Years since quittin.0    Smokeless tobacco: Never    Tobacco comments:     occasionally every couple days   Vaping Use    Vaping status: Never Used   Substance and Sexual Activity    Alcohol use: Yes     Alcohol/week: 6.0 standard drinks of alcohol     Types: 2 Cans of beer, 4 Shots of liquor per week     Comment: Social    Drug use: No    Sexual activity: Not Currently     Partners: Female   Other Topics Concern    Not on file   Social History  "Narrative    Not on file     Social Determinants of Health     Financial Resource Strain: Not on file   Food Insecurity: Not on file   Transportation Needs: Not on file   Physical Activity: Not on file   Stress: Not on file   Social Connections: Not on file   Intimate Partner Violence: Not on file   Housing Stability: Not on file       Review of Systems   Constitutional:  Negative for chills and fever.   HENT:  Negative for ear pain and sore throat.    Eyes:  Negative for pain and visual disturbance.   Respiratory:  Negative for cough and shortness of breath.    Cardiovascular:  Negative for chest pain and palpitations.   Gastrointestinal:  Negative for abdominal pain and vomiting.   Genitourinary:  Negative for dysuria and hematuria.   Musculoskeletal:  Negative for arthralgias and back pain.   Skin:  Negative for color change and rash.   Neurological:  Negative for seizures and syncope.   All other systems reviewed and are negative.        Objective:  Vitals:    04/11/24 1120   BP: 120/78   BP Location: Left arm   Patient Position: Sitting   Cuff Size: Large   Pulse: 70   SpO2: 98%   Weight: (!) 138 kg (305 lb)   Height: 5' 10\" (1.778 m)     Body mass index is 43.76 kg/m².     Physical Exam  Vitals and nursing note reviewed.   Constitutional:       Appearance: Normal appearance. He is obese.   HENT:      Head: Normocephalic and atraumatic.   Cardiovascular:      Rate and Rhythm: Normal rate.   Pulmonary:      Effort: Pulmonary effort is normal.   Musculoskeletal:         General: Normal range of motion.      Cervical back: Normal range of motion.   Skin:     General: Skin is warm and dry.   Neurological:      General: No focal deficit present.      Mental Status: He is alert and oriented to person, place, and time. Mental status is at baseline.   Psychiatric:         Mood and Affect: Mood normal.           RESULTS:  Hemoglobin A1C   Date/Time Value Ref Range Status   12/23/2023 08:06 AM 6.0 (H) 4.8 - 5.6 % Final "     Comment:              Prediabetes: 5.7 - 6.4           Diabetes: >6.4           Glycemic control for adults with diabetes: <7.0       Cholesterol, Total   Date/Time Value Ref Range Status   08/25/2023 08:09  100 - 199 mg/dL Final     Triglycerides   Date/Time Value Ref Range Status   08/25/2023 08:09 AM 58 0 - 149 mg/dL Final     HDL   Date/Time Value Ref Range Status   08/25/2023 08:09 AM 54 >39 mg/dL Final     LDL Calculated   Date/Time Value Ref Range Status   08/25/2023 08:09 AM 87 0 - 99 mg/dL Final     Hemoglobin   Date/Time Value Ref Range Status   12/23/2023 08:06 AM 13.3 13.0 - 17.7 g/dL Final   01/04/2023 10:32 AM 15.0 12.0 - 17.0 g/dL Final   09/26/2017 10:36 AM 13.5 12.6 - 17.7 g/dL Final     Hematocrit   Date/Time Value Ref Range Status   01/04/2023 10:32 AM 45.3 36.5 - 49.3 % Final   09/26/2017 10:36 AM 41.8 37.5 - 51.0 % Final     HCT   Date/Time Value Ref Range Status   12/23/2023 08:06 AM 40.2 37.5 - 51.0 % Final     Platelet Count   Date/Time Value Ref Range Status   12/23/2023 08:06  150 - 450 x10E3/uL Final     Platelets   Date/Time Value Ref Range Status   01/04/2023 10:32  149 - 390 Thousands/uL Final   09/26/2017 10:36  150 - 379 x10E3/uL Final     Comment:     Performed at: [facility], [address] [place] [address], [place], [place], [id number], [id number]  MD:  [address] [place] [phone]       Prostate Specific Antigen Total   Date/Time Value Ref Range Status   08/25/2023 08:09 AM 0.4 0.0 - 4.0 ng/mL Final     Comment:     Roche ECLIA methodology.  According to the American Urological Association, Serum PSA should  decrease and remain at undetectable levels after radical  prostatectomy. The AUA defines biochemical recurrence as an initial  PSA value 0.2 ng/mL or greater followed by a subsequent confirmatory  PSA value 0.2 ng/mL or greater.  Values obtained with different assay methods or kits cannot be used  interchangeably. Results cannot be interpreted as  absolute evidence  of the presence or absence of malignant disease.       TSH 3RD GENERATON   Date/Time Value Ref Range Status   05/30/2017 11:06 AM 2.250 0.450 - 4.500 uIU/mL Final     Comment:       Performed at: [facility], [address] [place] [address], , [place], [place], [id number], [id number]  MD:  [address] [place] [phone]       Sodium   Date/Time Value Ref Range Status   12/23/2023 08:06  134 - 144 mmol/L Final     BUN   Date/Time Value Ref Range Status   12/23/2023 08:06 AM 9 8 - 27 mg/dL Final     Creatinine   Date/Time Value Ref Range Status   12/23/2023 08:06 AM 0.91 0.76 - 1.27 mg/dL Final   01/04/2023 10:32 AM 0.92 0.60 - 1.30 mg/dL Final     Comment:     Standardized to IDMS reference method   09/26/2017 10:36 AM 0.74 (L) 0.76 - 1.27 mg/dL Final      In chart    This note was created with voice recognition software.  Phonic, grammatical and spelling errors may be present within the note as a result.

## 2024-04-18 ENCOUNTER — TELEPHONE (OUTPATIENT)
Dept: INTERNAL MEDICINE CLINIC | Facility: CLINIC | Age: 59
End: 2024-04-18

## 2024-04-18 DIAGNOSIS — E11.21 CONTROLLED TYPE 2 DIABETES MELLITUS WITH DIABETIC NEPHROPATHY, WITHOUT LONG-TERM CURRENT USE OF INSULIN (HCC): ICD-10-CM

## 2024-04-18 NOTE — TELEPHONE ENCOUNTER
Patient went to  the semaglutide 1 mg/dose 4 mg/3 mL injection pen, [facility] told him this needs to be either 1 mg or 2 mg, they cannot do half doses.     Please advise..    [facility]    Call back #[phone]

## 2024-04-30 DIAGNOSIS — E11.21 CONTROLLED TYPE 2 DIABETES MELLITUS WITH DIABETIC NEPHROPATHY, WITHOUT LONG-TERM CURRENT USE OF INSULIN (HCC): ICD-10-CM

## 2024-04-30 NOTE — TELEPHONE ENCOUNTER
Medication: semaglutide, 1 mg/dose, (Ozempic, 1 MG/DOSE,) 4 mg/3 mL injection pen     Dose/Frequency: 2 mg, Subcutaneous, Every 7 days     Quantity:  9 mL     Pharmacy: [id number] - [place] - [address]    Office:   [x] PCP/Provider - Lazaro Prescott  [] Speciality/Provider -     Does the patient have enough for 3 days?   [x] Yes   [] No - Send as HP to POD

## 2024-05-03 ENCOUNTER — TELEPHONE (OUTPATIENT)
Age: 59
End: 2024-05-03

## 2024-05-03 NOTE — TELEPHONE ENCOUNTER
Received a call from Marleny at [facility] that prior authorization for ozempic has been denied.  Reference number for the call is [id number].

## 2024-05-03 NOTE — TELEPHONE ENCOUNTER
PA for Ozempic, 1 MG/DOSE, 4 mg/3 mL     Submitted via    []CMM-KEY   [x][employer]-Case ID # [id number]   []Faxed to plan   []Other website   []Phone call Case ID #     Office notes sent, clinical questions answered. Awaiting determination    Turnaround time for your insurance to make a decision on your Prior Authorization can take 7-21 business days.

## 2024-05-03 NOTE — TELEPHONE ENCOUNTER
PA for Ozempic, 1 MG/DOSE, 4 mg/3 mL  Denied    Reason:            Message sent to provider pool Yes    Denial letter scanned into Media Yes    Appeal started No    (Provider will need to decide if appeal is warranted and send clinical documentation to PA team for initiation.)

## 2024-05-06 ENCOUNTER — TELEPHONE (OUTPATIENT)
Age: 59
End: 2024-05-06

## 2024-05-06 DIAGNOSIS — E11.21 CONTROLLED TYPE 2 DIABETES MELLITUS WITH DIABETIC NEPHROPATHY, WITHOUT LONG-TERM CURRENT USE OF INSULIN (HCC): Primary | ICD-10-CM

## 2024-05-06 NOTE — TELEPHONE ENCOUNTER
Patient returned call.  Please call him when lab slip for his A1C blood work is entered into the system and he will go for the lab work.   I was unable to addend on the previous note.  Thank you.

## 2024-05-11 LAB
CREAT ?TM UR-SCNC: 269.7 UMOL/L
EXT ALBUMIN URINE RANDOM: 7.1
HBA1C MFR BLD HPLC: 5.8 %
MICROALBUMIN/CREAT UR: 3 MG/G{CREAT}

## 2024-05-12 LAB
ALBUMIN SERPL-MCNC: 4 G/DL (ref 3.8–4.9)
ALBUMIN/CREAT UR: 3 MG/G CREAT (ref 0–29)
ALBUMIN/GLOB SERPL: 1.8 {RATIO} (ref 1.2–2.2)
ALP SERPL-CCNC: 78 IU/L (ref 44–121)
ALT SERPL-CCNC: 16 IU/L (ref 0–44)
AST SERPL-CCNC: 18 IU/L (ref 0–40)
BASOPHILS # BLD AUTO: 0 X10E3/UL (ref 0–0.2)
BASOPHILS NFR BLD AUTO: 1 %
BILIRUB SERPL-MCNC: 0.5 MG/DL (ref 0–1.2)
BUN SERPL-MCNC: 13 MG/DL (ref 8–27)
BUN/CREAT SERPL: 17 (ref 10–24)
CALCIUM SERPL-MCNC: 8.7 MG/DL (ref 8.6–10.2)
CHLORIDE SERPL-SCNC: 105 MMOL/L (ref 96–106)
CHOLEST SERPL-MCNC: 141 MG/DL (ref 100–199)
CO2 SERPL-SCNC: 21 MMOL/L (ref 20–29)
CREAT SERPL-MCNC: 0.78 MG/DL (ref 0.76–1.27)
CREAT UR-MCNC: 269.7 MG/DL
EGFR: 102 ML/MIN/1.73
EOSINOPHIL # BLD AUTO: 0.1 X10E3/UL (ref 0–0.4)
EOSINOPHIL NFR BLD AUTO: 3 %
ERYTHROCYTE [DISTWIDTH] IN BLOOD BY AUTOMATED COUNT: 12.9 % (ref 11.6–15.4)
GLOBULIN SER-MCNC: 2.2 G/DL (ref 1.5–4.5)
GLUCOSE SERPL-MCNC: 90 MG/DL (ref 70–99)
HBA1C MFR BLD: 5.8 % (ref 4.8–5.6)
HCT VFR BLD AUTO: 40.5 % (ref 37.5–51)
HDLC SERPL-MCNC: 48 MG/DL
HGB BLD-MCNC: 13.4 G/DL (ref 13–17.7)
IMM GRANULOCYTES # BLD: 0 X10E3/UL (ref 0–0.1)
IMM GRANULOCYTES NFR BLD: 0 %
LDLC SERPL CALC-MCNC: 81 MG/DL (ref 0–99)
LYMPHOCYTES # BLD AUTO: 1.7 X10E3/UL (ref 0.7–3.1)
LYMPHOCYTES NFR BLD AUTO: 33 %
MCH RBC QN AUTO: 30.7 PG (ref 26.6–33)
MCHC RBC AUTO-ENTMCNC: 33.1 G/DL (ref 31.5–35.7)
MCV RBC AUTO: 93 FL (ref 79–97)
MICROALBUMIN UR-MCNC: 7.1 UG/ML
MONOCYTES # BLD AUTO: 0.4 X10E3/UL (ref 0.1–0.9)
MONOCYTES NFR BLD AUTO: 8 %
NEUTROPHILS # BLD AUTO: 2.9 X10E3/UL (ref 1.4–7)
NEUTROPHILS NFR BLD AUTO: 55 %
PLATELET # BLD AUTO: 226 X10E3/UL (ref 150–450)
POTASSIUM SERPL-SCNC: 4 MMOL/L (ref 3.5–5.2)
PROT SERPL-MCNC: 6.2 G/DL (ref 6–8.5)
RBC # BLD AUTO: 4.36 X10E6/UL (ref 4.14–5.8)
SL AMB VLDL CHOLESTEROL CALC: 12 MG/DL (ref 5–40)
SODIUM SERPL-SCNC: 141 MMOL/L (ref 134–144)
TRIGL SERPL-MCNC: 58 MG/DL (ref 0–149)
TSH SERPL DL<=0.005 MIU/L-ACNC: 1.43 UIU/ML (ref 0.45–4.5)
WBC # BLD AUTO: 5.3 X10E3/UL (ref 3.4–10.8)

## 2024-05-17 DIAGNOSIS — E11.21 CONTROLLED TYPE 2 DIABETES MELLITUS WITH DIABETIC NEPHROPATHY, WITHOUT LONG-TERM CURRENT USE OF INSULIN (HCC): ICD-10-CM

## 2024-05-17 NOTE — TELEPHONE ENCOUNTER
Medication: semaglutide, 1 mg/dose, (Ozempic, 1 MG/DOSE,) 4 mg/3 mL injection pen     Dose/Frequency: Inject 1.5 mL (2 mg total) under the skin every 7 days     Quantity: 9 mL     Pharmacy: [id number] - [place] - [address]     Office:   [x] PCP/Provider -   [] Speciality/Provider -     Does the patient have enough for 3 days?   [] Yes   [x] No - Send as HP to POD

## 2024-05-21 ENCOUNTER — OFFICE VISIT (OUTPATIENT)
Dept: INTERNAL MEDICINE CLINIC | Facility: CLINIC | Age: 59
End: 2024-05-21
Payer: COMMERCIAL

## 2024-05-21 VITALS
SYSTOLIC BLOOD PRESSURE: 118 MMHG | BODY MASS INDEX: 43.38 KG/M2 | OXYGEN SATURATION: 97 % | DIASTOLIC BLOOD PRESSURE: 88 MMHG | RESPIRATION RATE: 18 BRPM | HEIGHT: 70 IN | WEIGHT: 303 LBS | HEART RATE: 60 BPM | TEMPERATURE: 98.7 F

## 2024-05-21 DIAGNOSIS — J30.1 ALLERGIC RHINITIS DUE TO POLLEN, UNSPECIFIED SEASONALITY: Primary | ICD-10-CM

## 2024-05-21 DIAGNOSIS — Z79.4 TYPE 2 DIABETES MELLITUS WITHOUT COMPLICATION, WITH LONG-TERM CURRENT USE OF INSULIN (HCC): ICD-10-CM

## 2024-05-21 DIAGNOSIS — R06.2 EXPIRATORY WHEEZING: ICD-10-CM

## 2024-05-21 DIAGNOSIS — E11.9 TYPE 2 DIABETES MELLITUS WITHOUT COMPLICATION, WITH LONG-TERM CURRENT USE OF INSULIN (HCC): ICD-10-CM

## 2024-05-21 PROCEDURE — 99214 OFFICE O/P EST MOD 30 MIN: CPT | Performed by: INTERNAL MEDICINE

## 2024-05-21 RX ORDER — ALBUTEROL SULFATE 90 UG/1
2 AEROSOL, METERED RESPIRATORY (INHALATION) EVERY 6 HOURS PRN
Qty: 8.5 G | Refills: 0 | Status: SHIPPED | OUTPATIENT
Start: 2024-05-21

## 2024-05-21 NOTE — TELEPHONE ENCOUNTER
"Pt called and said issue with how script was sent to pharmacy. Pharmacy claims the script needs to read \"2MG dosage once a week?\" Pt said to call pharmacy to verify, was hoping he can  script by tomorrow afternoon. Please advise with pharmacy.   "

## 2024-05-21 NOTE — PROGRESS NOTES
Assessment/Plan:      Non productive cough x 1 week, denies sputum production, he has recent work around his house, multiple trees have been cut down which precipitated these symptoms, reports post nasal drip, itchy eyes; not bacterial. He has been taking Claritin D. There are expiratory wheezes present throughout lungs.     Type 2 DM, sent in ozempic.    Quality Measures:       Depression Screening and Follow-up Plan: Clincally patient does not have depression. No treatment is required.     Tobacco Cessation Counseling: Tobacco cessation counseling was provided. The patient is sincerely urged to quit consumption of tobacco. He is not ready to quit tobacco.          Return for Next scheduled follow up.    No problem-specific Assessment & Plan notes found for this encounter.       Diagnoses and all orders for this visit:    Allergic rhinitis due to pollen, unspecified seasonality  -     albuterol (ProAir HFA) 90 mcg/act inhaler; Inhale 2 puffs every 6 (six) hours as needed for wheezing    Expiratory wheezing  -     albuterol (ProAir HFA) 90 mcg/act inhaler; Inhale 2 puffs every 6 (six) hours as needed for wheezing    Type 2 diabetes mellitus without complication, with long-term current use of insulin ([facility])  -     semaglutide, 1 mg/dose, (Ozempic, 1 MG/DOSE,) 4 mg/3 mL injection pen; Inject 1.5 mL (2 mg total) under the skin every 7 days          Subjective:      Patient ID: Guille Macias is a 59 y.o. male.    Here with reports of hacking cough.        ALLERGIES:  Allergies   Allergen Reactions    Prunus Persica Anaphylaxis     Also frankie, plums, apricot, nectarine       CURRENT MEDICATIONS:    Current Outpatient Medications:     albuterol (ProAir HFA) 90 mcg/act inhaler, Inhale 2 puffs every 6 (six) hours as needed for wheezing, Disp: 8.5 g, Rfl: 0    atorvastatin (LIPITOR) 40 mg tablet, Take 1 tablet (40 mg total) by mouth daily, Disp: 30 tablet, Rfl: 5    CALCIUM CITRATE PO, Take by mouth, Disp: , Rfl:      Cholecalciferol (Vitamin D3) 50 MCG (2000 UT) CAPS, Take 1 capsule (2,000 Units total) by mouth daily, Disp: 30 capsule, Rfl: 2    clobetasol (TEMOVATE) 0.05 % ointment, Apply topically 2 (two) times a day, Disp: 60 g, Rfl: 1    Cyanocobalamin (VITAMIN B-12) 5000 MCG SUBL, Place under the tongue, Disp: , Rfl:     ergocalciferol (VITAMIN D2) 50,000 units, take 1 capsule by mouth every week, Disp: 4 capsule, Rfl: 0    ferrous sulfate 324 (65 Fe) mg, Take 1 tablet (324 mg total) by mouth 2 (two) times a day before meals, Disp: 90 tablet, Rfl: 2    Insulin Pen Needle (Pen Needles) 32G X 4 MM MISC, Use in the morning To use with Victoza, Disp: 30 each, Rfl: 3    lisinopril (ZESTRIL) 20 mg tablet, Take 1 tablet (20 mg total) by mouth daily, Disp: 30 tablet, Rfl: 3    Multiple Vitamin (MULTI-VITAMIN DAILY) TABS, Take by mouth, Disp: , Rfl:     semaglutide, 1 mg/dose, (Ozempic, 1 MG/DOSE,) 4 mg/3 mL injection pen, Inject 1.5 mL (2 mg total) under the skin every 7 days, Disp: 9 mL, Rfl: 1    Current Facility-Administered Medications:     cyanocobalamin injection 1,000 mcg, 1,000 mcg, Intramuscular, Q30 Days, NEAL Wallis, 1,000 mcg at 04/25/19 1106    ACTIVE PROBLEM LIST:  Patient Active Problem List   Diagnosis    Osteoarthritis of first metatarsophalangeal joint    Ventral hernia    Umbilical hernia    Vitamin D deficiency    Lichen planus    Postgastrectomy malabsorption    Seborrheic keratoses, inflamed    Vitamin B12 deficiency    Obesity, Class III, BMI 40-49.9 (morbid obesity) (HCC)    At risk for obstructive sleep apnea    Encounter for surgical aftercare following surgery of digestive system    Controlled type 2 diabetes mellitus with diabetic nephropathy, without long-term current use of insulin (HCC)       PAST MEDICAL HISTORY:  Past Medical History:   Diagnosis Date    Allergic     [place] [facility]    Arthritis     Last assessed: 5/21/12    Diabetes mellitus (HCC) 2/20/07    Off and on for 15Years    History of  bariatric surgery     Hypertension 20    Morbid obesity due to excess calories (HCC)     Last assessed: 12    Obesity     Postsurgical malabsorption     Type 2 diabetes mellitus (HCC)     Last assessed: 6/5/15    Vitamin B1 deficiency     Last assessed: 14    Vitamin D deficiency     Last assessed: 14       PAST SURGICAL HISTORY:  Past Surgical History:   Procedure Laterality Date    GASTRIC BYPASS      For Morbid Obesity. Managed by: José Miguel Deleon. Last assessed: 14    HERNIA REPAIR         FAMILY HISTORY:  Family History   Problem Relation Age of Onset    Heart failure Mother         Congestive    Hypertension Mother     Heart disease Mother     Bone cancer Father     Throat cancer Father         Laryngeal    Diabetes Neg Hx     Thyroid disease Neg Hx     Stroke Neg Hx        SOCIAL HISTORY:  Social History     Socioeconomic History    Marital status: /Civil Union     Spouse name: Not on file    Number of children: Not on file    Years of education: Not on file    Highest education level: Not on file   Occupational History    Not on file   Tobacco Use    Smoking status: Some Days     Current packs/day: 0.00     Average packs/day: 0.3 packs/day for 10.0 years (2.5 ttl pk-yrs)     Types: Cigarettes     Start date: 2000     Last attempt to quit: 2010     Years since quittin.1    Smokeless tobacco: Never    Tobacco comments:     occasionally every couple days   Vaping Use    Vaping status: Never Used   Substance and Sexual Activity    Alcohol use: Yes     Alcohol/week: 6.0 standard drinks of alcohol     Types: 2 Cans of beer, 4 Shots of liquor per week     Comment: Social    Drug use: No    Sexual activity: Not Currently     Partners: Female   Other Topics Concern    Not on file   Social History Narrative    Not on file     Social Determinants of Health     Financial Resource Strain: Not on file   Food Insecurity: Not on file   Transportation Needs: Not on file  "  Physical Activity: Not on file   Stress: Not on file   Social Connections: Not on file   Intimate Partner Violence: Not on file   Housing Stability: Not on file       Review of Systems   Constitutional:  Negative for chills and fever.   HENT:  Negative for ear pain and sore throat.    Eyes:  Negative for pain and visual disturbance.   Respiratory:  Positive for cough. Negative for shortness of breath.    Cardiovascular:  Negative for chest pain and palpitations.   Gastrointestinal:  Negative for abdominal pain and vomiting.   Genitourinary:  Negative for dysuria and hematuria.   Musculoskeletal:  Negative for arthralgias and back pain.   Skin:  Negative for color change and rash.   Neurological:  Negative for seizures and syncope.   All other systems reviewed and are negative.        Objective:  Vitals:    05/21/24 1013 05/21/24 1022   BP: 136/86 118/88   BP Location: Left arm    Patient Position: Sitting    Cuff Size: Standard    Pulse: 60    Resp: 18    Temp: 98.7 °F (37.1 °C)    TempSrc: Tympanic    SpO2: 97%    Weight: (!) 137 kg (303 lb)    Height: 5' 10\" (1.778 m)      Body mass index is 43.48 kg/m².     Physical Exam  Vitals and nursing note reviewed.   Constitutional:       Appearance: Normal appearance. He is obese.   HENT:      Head: Normocephalic and atraumatic.   Cardiovascular:      Rate and Rhythm: Normal rate.   Pulmonary:      Effort: Pulmonary effort is normal.   Musculoskeletal:         General: Normal range of motion.      Cervical back: Normal range of motion.   Skin:     General: Skin is warm and dry.   Neurological:      General: No focal deficit present.      Mental Status: He is alert and oriented to person, place, and time. Mental status is at baseline.   Psychiatric:         Mood and Affect: Mood normal.           RESULTS:  Hemoglobin A1C   Date/Time Value Ref Range Status   05/11/2024 09:07 AM 5.8 (H) 4.8 - 5.6 % Final     Comment:              Prediabetes: 5.7 - 6.4           Diabetes: " >6.4           Glycemic control for adults with diabetes: <7.0       Cholesterol, Total   Date/Time Value Ref Range Status   05/11/2024 09:07  100 - 199 mg/dL Final     Triglycerides   Date/Time Value Ref Range Status   05/11/2024 09:07 AM 58 0 - 149 mg/dL Final     HDL   Date/Time Value Ref Range Status   05/11/2024 09:07 AM 48 >39 mg/dL Final     LDL Calculated   Date/Time Value Ref Range Status   05/11/2024 09:07 AM 81 0 - 99 mg/dL Final     Hemoglobin   Date/Time Value Ref Range Status   05/11/2024 09:07 AM 13.4 13.0 - 17.7 g/dL Final   01/04/2023 10:32 AM 15.0 12.0 - 17.0 g/dL Final   09/26/2017 10:36 AM 13.5 12.6 - 17.7 g/dL Final     Hematocrit   Date/Time Value Ref Range Status   01/04/2023 10:32 AM 45.3 36.5 - 49.3 % Final   09/26/2017 10:36 AM 41.8 37.5 - 51.0 % Final     HCT   Date/Time Value Ref Range Status   05/11/2024 09:07 AM 40.5 37.5 - 51.0 % Final     Platelet Count   Date/Time Value Ref Range Status   05/11/2024 09:07  150 - 450 x10E3/uL Final     Platelets   Date/Time Value Ref Range Status   01/04/2023 10:32  149 - 390 Thousands/uL Final   09/26/2017 10:36  150 - 379 x10E3/uL Final     Comment:     Performed at: [facility], [address] [place] [address], , [place], [place], [id number], [id number]  MD:  [address] [place] [id number]       Prostate Specific Antigen Total   Date/Time Value Ref Range Status   08/25/2023 08:09 AM 0.4 0.0 - 4.0 ng/mL Final     Comment:     Roche ECLIA methodology.  According to the American Urological Association, Serum PSA should  decrease and remain at undetectable levels after radical  prostatectomy. The AUA defines biochemical recurrence as an initial  PSA value 0.2 ng/mL or greater followed by a subsequent confirmatory  PSA value 0.2 ng/mL or greater.  Values obtained with different assay methods or kits cannot be used  interchangeably. Results cannot be interpreted as absolute evidence  of the presence or absence of malignant disease.        TSH 3RD GENERATON   Date/Time Value Ref Range Status   05/30/2017 11:06 AM 2.250 0.450 - 4.500 uIU/mL Final     Comment:       Performed at: [facility], [address] [place] [address], , [place], [place], [id number], [id number]  MD:  [address] [place] [id number]       Sodium   Date/Time Value Ref Range Status   05/11/2024 09:07  134 - 144 mmol/L Final     BUN   Date/Time Value Ref Range Status   05/11/2024 09:07 AM 13 8 - 27 mg/dL Final     Creatinine   Date/Time Value Ref Range Status   05/11/2024 09:07 AM 0.78 0.76 - 1.27 mg/dL Final   01/04/2023 10:32 AM 0.92 0.60 - 1.30 mg/dL Final     Comment:     Standardized to IDMS reference method   09/26/2017 10:36 AM 0.74 (L) 0.76 - 1.27 mg/dL Final      In chart    This note was created with voice recognition software.  Phonic, grammatical and spelling errors may be present within the note as a result.

## 2024-05-22 ENCOUNTER — TELEPHONE (OUTPATIENT)
Age: 59
End: 2024-05-22

## 2024-05-22 NOTE — TELEPHONE ENCOUNTER
Patient called, he stated he was seen by Dr Prescott yesterday 5/21. He is still not feeling good due to the wheezing. Patient said he has a medical clearance form that has to be filled out by Dr Prescott so that he can return to work.without the form he can not return back until he is cleared.  Since he is not feeling good he would like to return to work on Friday.      Please advise, Please call patient to advise.  Thank you

## 2024-05-22 NOTE — TELEPHONE ENCOUNTER
Patient dropped off form to be filled out so he can return to work on 05/24.    Call patient at [phone] when complete and he will .     Placed in MA's bin

## 2024-05-22 NOTE — TELEPHONE ENCOUNTER
Form completed and a copy was made to scan into chart. Form placed in  accordion for . Patient aware.

## 2024-07-01 DIAGNOSIS — I10 PRIMARY HYPERTENSION: ICD-10-CM

## 2024-07-01 RX ORDER — LISINOPRIL 20 MG/1
20 TABLET ORAL DAILY
Qty: 30 TABLET | Refills: 3 | Status: SHIPPED | OUTPATIENT
Start: 2024-07-01

## 2024-07-22 ENCOUNTER — OFFICE VISIT (OUTPATIENT)
Dept: INTERNAL MEDICINE CLINIC | Facility: CLINIC | Age: 59
End: 2024-07-22
Payer: COMMERCIAL

## 2024-07-22 VITALS
WEIGHT: 294.4 LBS | DIASTOLIC BLOOD PRESSURE: 82 MMHG | SYSTOLIC BLOOD PRESSURE: 132 MMHG | HEART RATE: 64 BPM | TEMPERATURE: 97.9 F | OXYGEN SATURATION: 97 % | RESPIRATION RATE: 18 BRPM | BODY MASS INDEX: 42.15 KG/M2 | HEIGHT: 70 IN

## 2024-07-22 DIAGNOSIS — E11.9 TYPE 2 DIABETES MELLITUS WITHOUT COMPLICATION, WITH LONG-TERM CURRENT USE OF INSULIN (HCC): ICD-10-CM

## 2024-07-22 DIAGNOSIS — Z79.4 TYPE 2 DIABETES MELLITUS WITHOUT COMPLICATION, WITH LONG-TERM CURRENT USE OF INSULIN (HCC): ICD-10-CM

## 2024-07-22 DIAGNOSIS — A05.9 FOOD POISONING: Primary | ICD-10-CM

## 2024-07-22 PROCEDURE — 99213 OFFICE O/P EST LOW 20 MIN: CPT | Performed by: INTERNAL MEDICINE

## 2024-07-22 NOTE — PROGRESS NOTES
Ambulatory Visit  Name: Guille Macias      : 1965      MRN: [id number]  Encounter Provider: Lazaro Prescott MD  Encounter Date: 2024   Encounter department: [facility] INTERNAL MEDICINE [place]    Assessment & Plan     Vomiting and episodes of diarrhea ever since yesterday after eating a salad with salmon, symptoms have resolved. Discussed hydration and calling me if symptoms worsen or come back. Discussed BRAT diet.    Labs ordered for DM.     1. Food poisoning  2. Type 2 diabetes mellitus without complication, with long-term current use of insulin (HCC)  -     Hemoglobin A1c (w/out EAG) ([facility] ONLY); Future; Expected date: 2024  -     Basic metabolic panel; Future; Expected date: 2024  -     CBC and differential; Future; Expected date: 2024  -     Hemoglobin A1c (w/out EAG) ([facility] ONLY)       History of Present Illness     Here with reports of vomiting.    Review of Systems   Constitutional:  Negative for chills and fever.   HENT:  Negative for ear pain and sore throat.    Eyes:  Negative for pain and visual disturbance.   Respiratory:  Negative for cough and shortness of breath.    Cardiovascular:  Negative for chest pain and palpitations.   Gastrointestinal:  Positive for vomiting. Negative for abdominal pain.   Genitourinary:  Negative for dysuria and hematuria.   Musculoskeletal:  Negative for arthralgias and back pain.   Skin:  Negative for color change and rash.   Neurological:  Negative for seizures and syncope.   All other systems reviewed and are negative.    Past Medical History   Past Medical History:   Diagnosis Date   • Allergic     [place] Family   • Arthritis     Last assessed: 12   • Diabetes mellitus (HCC) 07    Off and on for 15Years   • History of bariatric surgery    • Hypertension 20   • Morbid obesity due to excess calories (HCC)     Last assessed: 12   • Obesity    • Postsurgical malabsorption    • Type 2 diabetes mellitus  ([facility])     Last assessed: 6/5/15   • Vitamin B1 deficiency     Last assessed: 9/29/14   • Vitamin D deficiency     Last assessed: 9/29/14     Past Surgical History:   Procedure Laterality Date   • GASTRIC BYPASS      For Morbid Obesity. Managed by: José Miguel Deleon. Last assessed: 6/16/14   • HERNIA REPAIR       Family History   Problem Relation Age of Onset   • Heart failure Mother         Congestive   • Hypertension Mother    • Heart disease Mother    • Bone cancer Father    • Throat cancer Father         Laryngeal   • Diabetes Neg Hx    • Thyroid disease Neg Hx    • Stroke Neg Hx      Current Outpatient Medications on File Prior to Visit   Medication Sig Dispense Refill   • albuterol (ProAir HFA) 90 mcg/act inhaler Inhale 2 puffs every 6 (six) hours as needed for wheezing (Patient taking differently: Inhale 2 puffs every 6 (six) hours as needed for wheezing PRN) 8.5 g 0   • atorvastatin (LIPITOR) 40 mg tablet Take 1 tablet (40 mg total) by mouth daily 30 tablet 5   • CALCIUM CITRATE PO Take by mouth     • Cholecalciferol (Vitamin D3) 50 MCG (2000 UT) CAPS Take 1 capsule (2,000 Units total) by mouth daily 30 capsule 2   • clobetasol (TEMOVATE) 0.05 % ointment Apply topically 2 (two) times a day 60 g 1   • Cyanocobalamin (VITAMIN B-12) 5000 MCG SUBL Place under the tongue     • ergocalciferol (VITAMIN D2) 50,000 units take 1 capsule by mouth every week 4 capsule 0   • ferrous sulfate 324 (65 Fe) mg Take 1 tablet (324 mg total) by mouth 2 (two) times a day before meals 90 tablet 2   • Insulin Pen Needle (Pen Needles) 32G X 4 MM MISC Use in the morning To use with Victoza 30 each 3   • lisinopril (ZESTRIL) 20 mg tablet Take 1 tablet (20 mg total) by mouth daily 30 tablet 3   • Multiple Vitamin (MULTI-VITAMIN DAILY) TABS Take by mouth     • semaglutide, 2 mg/dose, (Ozempic) 8 mg/ mL injection pen Inject 0.75 mL (2 mg total) under the skin once a week 9 mL 1     Current Facility-Administered Medications on File Prior to  "Visit   Medication Dose Route Frequency Provider Last Rate Last Admin   • cyanocobalamin injection 1,000 mcg  1,000 mcg Intramuscular Q30 Days NEAL Wallis   1,000 mcg at 04/25/19 1106     Allergies   Allergen Reactions   • Prunus Persica Anaphylaxis     Also frankie, plums, apricot, nectarine      Objective     /82 (BP Location: Right arm, Patient Position: Sitting, Cuff Size: Standard)   Pulse 64   Temp 97.9 °F (36.6 °C) (Tympanic)   Resp 18   Ht 5' 10\" (1.778 m)   Wt 134 kg (294 lb 6.4 oz)   SpO2 97%   BMI 42.24 kg/m²     Physical Exam  Vitals and nursing note reviewed.   Constitutional:       General: He is not in acute distress.     Appearance: He is well-developed. He is obese.   HENT:      Head: Normocephalic and atraumatic.      Mouth/Throat:      Mouth: Mucous membranes are dry.   Cardiovascular:      Rate and Rhythm: Normal rate.      Heart sounds: No murmur heard.  Pulmonary:      Effort: Pulmonary effort is normal. No respiratory distress.   Abdominal:      Tenderness: There is no abdominal tenderness.   Musculoskeletal:         General: No swelling.   Skin:     General: Skin is warm and dry.   Neurological:      Mental Status: He is alert.   Psychiatric:         Mood and Affect: Mood normal.   Administrative Statements   I have spent a total time of 15 minutes in caring for this patient on the day of the visit/encounter including Patient and family education, Importance of tx compliance, Risk factor reductions, Impressions, Counseling / Coordination of care, and Obtaining or reviewing history  .        "

## 2024-08-12 DIAGNOSIS — E55.9 VITAMIN D DEFICIENCY: ICD-10-CM

## 2024-08-12 RX ORDER — ERGOCALCIFEROL 1.25 MG/1
50000 CAPSULE, LIQUID FILLED ORAL WEEKLY
Qty: 4 CAPSULE | Refills: 0 | Status: SHIPPED | OUTPATIENT
Start: 2024-08-12

## 2024-08-25 LAB
BASOPHILS # BLD AUTO: 0 X10E3/UL (ref 0–0.2)
BASOPHILS NFR BLD AUTO: 1 %
BUN SERPL-MCNC: 10 MG/DL (ref 8–27)
BUN/CREAT SERPL: 12 (ref 10–24)
CALCIUM SERPL-MCNC: 8.7 MG/DL (ref 8.6–10.2)
CHLORIDE SERPL-SCNC: 106 MMOL/L (ref 96–106)
CO2 SERPL-SCNC: 21 MMOL/L (ref 20–29)
CREAT SERPL-MCNC: 0.83 MG/DL (ref 0.76–1.27)
EGFR: 100 ML/MIN/1.73
EOSINOPHIL # BLD AUTO: 0.2 X10E3/UL (ref 0–0.4)
EOSINOPHIL NFR BLD AUTO: 3 %
ERYTHROCYTE [DISTWIDTH] IN BLOOD BY AUTOMATED COUNT: 12.7 % (ref 11.6–15.4)
EST. AVERAGE GLUCOSE BLD GHB EST-MCNC: 120 MG/DL
GLUCOSE SERPL-MCNC: 89 MG/DL (ref 70–99)
HBA1C MFR BLD: 5.8 % (ref 4.8–5.6)
HCT VFR BLD AUTO: 38.3 % (ref 37.5–51)
HGB BLD-MCNC: 12.6 G/DL (ref 13–17.7)
IMM GRANULOCYTES # BLD: 0 X10E3/UL (ref 0–0.1)
IMM GRANULOCYTES NFR BLD: 0 %
LYMPHOCYTES # BLD AUTO: 1.6 X10E3/UL (ref 0.7–3.1)
LYMPHOCYTES NFR BLD AUTO: 29 %
MCH RBC QN AUTO: 30.7 PG (ref 26.6–33)
MCHC RBC AUTO-ENTMCNC: 32.9 G/DL (ref 31.5–35.7)
MCV RBC AUTO: 93 FL (ref 79–97)
MONOCYTES # BLD AUTO: 0.6 X10E3/UL (ref 0.1–0.9)
MONOCYTES NFR BLD AUTO: 11 %
NEUTROPHILS # BLD AUTO: 3 X10E3/UL (ref 1.4–7)
NEUTROPHILS NFR BLD AUTO: 56 %
PLATELET # BLD AUTO: 240 X10E3/UL (ref 150–450)
POTASSIUM SERPL-SCNC: 3.9 MMOL/L (ref 3.5–5.2)
RBC # BLD AUTO: 4.11 X10E6/UL (ref 4.14–5.8)
SODIUM SERPL-SCNC: 141 MMOL/L (ref 134–144)
WBC # BLD AUTO: 5.4 X10E3/UL (ref 3.4–10.8)

## 2024-08-27 ENCOUNTER — TELEPHONE (OUTPATIENT)
Age: 59
End: 2024-08-27

## 2024-08-27 NOTE — TELEPHONE ENCOUNTER
Patient called, requesting to be transferred to the office, he was speaking to someone in the office before lost call connection.  In regards to a form that needs to be completed, Dr Prescott - to enter a Dx on form. Transferred call to [place] for assistance.

## 2024-09-06 LAB
LEFT EYE DIABETIC RETINOPATHY: NORMAL
RIGHT EYE DIABETIC RETINOPATHY: NORMAL

## 2024-09-13 ENCOUNTER — OFFICE VISIT (OUTPATIENT)
Dept: INTERNAL MEDICINE CLINIC | Facility: CLINIC | Age: 59
End: 2024-09-13
Payer: COMMERCIAL

## 2024-09-13 VITALS
RESPIRATION RATE: 18 BRPM | WEIGHT: 298 LBS | HEIGHT: 70 IN | SYSTOLIC BLOOD PRESSURE: 128 MMHG | HEART RATE: 63 BPM | OXYGEN SATURATION: 95 % | BODY MASS INDEX: 42.66 KG/M2 | DIASTOLIC BLOOD PRESSURE: 80 MMHG

## 2024-09-13 DIAGNOSIS — Z00.00 ANNUAL PHYSICAL EXAM: Primary | ICD-10-CM

## 2024-09-13 DIAGNOSIS — T78.00XA ANAPHYLACTIC REACTION DUE TO FOOD: ICD-10-CM

## 2024-09-13 DIAGNOSIS — Z79.4 TYPE 2 DIABETES MELLITUS WITHOUT COMPLICATION, WITH LONG-TERM CURRENT USE OF INSULIN (HCC): ICD-10-CM

## 2024-09-13 DIAGNOSIS — E11.9 TYPE 2 DIABETES MELLITUS WITHOUT COMPLICATION, WITH LONG-TERM CURRENT USE OF INSULIN (HCC): ICD-10-CM

## 2024-09-13 DIAGNOSIS — Z12.5 PROSTATE CANCER SCREENING: ICD-10-CM

## 2024-09-13 PROCEDURE — 99396 PREV VISIT EST AGE 40-64: CPT | Performed by: INTERNAL MEDICINE

## 2024-09-13 PROCEDURE — 99214 OFFICE O/P EST MOD 30 MIN: CPT | Performed by: INTERNAL MEDICINE

## 2024-09-13 RX ORDER — EPINEPHRINE 0.3 MG/.3ML
0.3 INJECTION SUBCUTANEOUS ONCE
Qty: 0.6 ML | Refills: 3 | Status: SHIPPED | OUTPATIENT
Start: 2024-09-13 | End: 2024-09-13

## 2024-09-16 ENCOUNTER — TELEPHONE (OUTPATIENT)
Dept: ADMINISTRATIVE | Facility: OTHER | Age: 59
End: 2024-09-16

## 2024-09-16 NOTE — TELEPHONE ENCOUNTER
----- Message from Anna LOJA sent at 9/13/2024  3:40 PM EDT -----  09/13/24 3:40 PM    Hello, our patient Guille M Easton has had Diabetic Eye Exam completed/performed. Please assist in updating the patient chart by making an External outreach to [facility] facility located in [place] phone number [phone]. The date of service is 9/2024.    Thank you,  Anna Pruitt LPN  [facility] [place]

## 2024-09-16 NOTE — LETTER
Diabetic Eye Exam Form    Date Requested: 24  Patient: Guille Macias  Patient : 1965   Referring Provider: Lazaro Prescott MD      DIABETIC Eye Exam Date _______________________________      Type of Exam MUST be documented for Diabetic Eye Exams. Please CHECK ONE.     Retinal Exam       Dilated Retinal Exam       OCT       Optomap-Iris Exam      Fundus Photography       Left Eye - Please check Retinopathy or No Retinopathy        Exam did show retinopathy    Exam did not show retinopathy       Right Eye - Please check Retinopathy or No Retinopathy       Exam did show retinopathy    Exam did not show retinopathy       Comments __________________________________________________________    Practice Providing Exam ______________________________________________    Exam Performed By (print name) _______________________________________      Provider Signature ___________________________________________________      These reports are needed for  compliance.  Please fax this completed form and a copy of the Diabetic Eye Exam report to our office located at [address] [place] [address] as soon as possible via Fax [phone] attention Noel: Phone [phone]  We thank you for your assistance in treating our mutual patient.

## 2024-09-16 NOTE — TELEPHONE ENCOUNTER
The patient is Watcher - Medium risk of patient condition declining or worsening    Shift Goals  Clinical Goals: Hemodynamic stability, safety, stable neuro exam  Patient Goals: Unable to assess  Family Goals: Receive updates    Progress made toward(s) clinical / shift goals:    Problem: Skin Integrity  Goal: Skin integrity is maintained or improved  Outcome: Progressing     Problem: Fall Risk  Goal: Patient will remain free from falls  Outcome: Progressing     Problem: Safety - Medical Restraint  Goal: Remains free of injury from restraints (Restraint for Interference with Medical Device)  Outcome: Progressing  Q2h restraint monitoring.      Problem: Pain - Standard  Goal: Alleviation of pain or a reduction in pain to the patient’s comfort goal  Outcome: Progressing      Upon review of the In Basket request and the patient's chart, initial outreach has been made via fax to facility. Please see Contacts section for details.     Thank you  Noel Rockwell MA

## 2024-09-20 NOTE — TELEPHONE ENCOUNTER
As a follow-up, a second attempt has been made for outreach via fax to facility. Please see Contacts section for details.    Thank you  Noel Rockwell MA

## 2024-09-27 ENCOUNTER — TELEPHONE (OUTPATIENT)
Dept: OTHER | Facility: OTHER | Age: 59
End: 2024-09-27

## 2024-09-27 ENCOUNTER — TELEPHONE (OUTPATIENT)
Age: 59
End: 2024-09-27

## 2024-09-27 DIAGNOSIS — Z79.4 TYPE 2 DIABETES MELLITUS WITHOUT COMPLICATION, WITH LONG-TERM CURRENT USE OF INSULIN (HCC): ICD-10-CM

## 2024-09-27 DIAGNOSIS — E11.9 TYPE 2 DIABETES MELLITUS WITHOUT COMPLICATION, WITH LONG-TERM CURRENT USE OF INSULIN (HCC): ICD-10-CM

## 2024-09-27 DIAGNOSIS — E11.9 TYPE 2 DIABETES MELLITUS WITHOUT COMPLICATION, WITH LONG-TERM CURRENT USE OF INSULIN (HCC): Primary | ICD-10-CM

## 2024-09-27 DIAGNOSIS — Z79.4 TYPE 2 DIABETES MELLITUS WITHOUT COMPLICATION, WITH LONG-TERM CURRENT USE OF INSULIN (HCC): Primary | ICD-10-CM

## 2024-09-27 NOTE — TELEPHONE ENCOUNTER
Patient called because his prior authorization for Ozempic was sent to [employer], but it needs to be sent to [facility] [employer] (prescription is at [facility]). Please follow up.

## 2024-09-27 NOTE — TELEPHONE ENCOUNTER
Reason for call:   [] Refill   [x] Prior Auth  [] Other:     Office:   [x] PCP/Provider - INTERNAL [facility] -   [] Specialty/Provider -     Medication: semaglutide, 0.25 or 0.5 mg/dose, (Ozempic, 0.25 or 0.5 MG/DOSE,) 2 mg/3 mL injection pen    Dose/Frequency: Inject 0.75 mL (0.5 mg total) under the skin every 7 days 0.25 mg under the skin every 7 days for 4 doses (28 days), THEN 0.5 mg under the skin every 7 days     Quantity:  9 mL     Pharmacy: [id number] - [place] - [address], [place] [id number]  Phone: [phone]  Fax: [phone]

## 2024-09-30 NOTE — TELEPHONE ENCOUNTER
Duplicate encounter created, please see telephone encounter from 09/27/2024 regarding Ozempic 0.25 mg PA status. Please review patient's chart to see if there is already an encounter regarding the medication in question and to document anything regarding this medication in regards to anything regarding the authorization process etc before creating another encounter Thank You.

## 2024-09-30 NOTE — TELEPHONE ENCOUNTER
Brigido small/[facility] pharmacy reached the after hours service and requested to have following msg sent to the office. [facility]--if you would like to appeal or seek other options please call [phone].   Last ov 8/10/2020 f/u 6 months  Last refill 8/7/2020    Medication refilled with note to f/u for further refills.

## 2024-09-30 NOTE — TELEPHONE ENCOUNTER
PA for Ozempic, 0.25 or 0.5 MG/DOSE SUBMITTED     via    []CMM-KEY:   [x][employer]-Case ID # [id number]   []Faxed to plan   []Other website   []Phone call Case ID #     Office notes sent, clinical questions answered. Awaiting determination    Turnaround time for your insurance to make a decision on your Prior Authorization can take 7-21 business days.

## 2024-10-01 ENCOUNTER — TELEPHONE (OUTPATIENT)
Age: 59
End: 2024-10-01

## 2024-10-01 DIAGNOSIS — E66.813 CLASS 3 SEVERE OBESITY DUE TO EXCESS CALORIES WITH SERIOUS COMORBIDITY AND BODY MASS INDEX (BMI) OF 40.0 TO 44.9 IN ADULT (HCC): Primary | ICD-10-CM

## 2024-10-01 DIAGNOSIS — E66.01 CLASS 3 SEVERE OBESITY DUE TO EXCESS CALORIES WITH SERIOUS COMORBIDITY AND BODY MASS INDEX (BMI) OF 40.0 TO 44.9 IN ADULT (HCC): Primary | ICD-10-CM

## 2024-10-01 RX ORDER — TIRZEPATIDE 2.5 MG/.5ML
2.5 INJECTION, SOLUTION SUBCUTANEOUS WEEKLY
Qty: 2 ML | Refills: 0 | Status: SHIPPED | OUTPATIENT
Start: 2024-10-01 | End: 2024-10-29

## 2024-10-01 NOTE — TELEPHONE ENCOUNTER
PA for ZEPBOUND 2.5 mg/0.5 mL SUBMITTED     via    []CMM-KEY:   [x]Surescripts-Case ID # [id number]   []Availity-Auth ID # NDC #   []Faxed to plan   []Other website   []Phone call Case ID #     Office notes sent, clinical questions answered. Awaiting determination    Turnaround time for your insurance to make a decision on your Prior Authorization can take 7-21 business days.

## 2024-10-01 NOTE — TELEPHONE ENCOUNTER
PA for Ozempic, 0.25 or 0.5 MG/DOSE  DENIED    Reason:(Screenshot if applicable)        Message sent to office clinical pool Yes    Denial letter scanned into Media Yes    Appeal started No (Provider will need to decide if appeal is warranted and send clinical documentation to Prior Authorization Team for initiation.)    **Please follow up with your patient regarding denial and next steps**

## 2024-10-01 NOTE — TELEPHONE ENCOUNTER
Patient was advised RX was denied he stated he will contact his insurance company to review denial

## 2024-10-01 NOTE — TELEPHONE ENCOUNTER
There is no active order on file for the Zepbound.  Please send the script to the pharmacy.  Once sent the PA can be submitted.  Thank you.

## 2024-10-01 NOTE — TELEPHONE ENCOUNTER
Pt called back in to report, he spoke with his insurance provider, and that they denied script for: Ozempic, 0.25 or 0.5 MG/DOSE - due to him doing so well with losing weight, he does not need the Ozempic.     However, insurance provider informed pt that he can be prescribed either Wegovy or Zepbound. Either of the two will also require Prior Authorization. To please call the following number for PA: [phone] (to expedite)    Lastly, pt inquired, which of the two will Dr. Prescott prescribe him, at what dose will it be, and how often would he have to inject himself?    Note: Pt would like when script is issued & PA approved, to please send to following pharmacy:  [id number] - [facility] [place] - [address] [phone]     Please advise & call pt back with update on his inquiries. Thank you!    Guille Macias   [phone]

## 2024-10-02 NOTE — TELEPHONE ENCOUNTER
PA for  ZEPBOUND 2.5 mg/0.5 mL APPROVED     Date(s) approved 10/1/2024-6/1/2025    Case #    Patient advised by          []MyChart Message  [x]Phone call   []LMOM  []L/M to call office as no active Communication consent on file  []Unable to leave detailed message as VM not approved on Communication consent       Pharmacy advised by    [x]Fax  []Phone call    Approval letter scanned into Media Yes     [de-identified] : 4/4/2022: Sinus  Rhythm \par -Left bundle branch block. Consistent with prior EKG.  [de-identified] : TTE 6/2021: LVEF 35%, inferolateral and basal inferior segment akinetic, hypokinetic basal and mid anterolateral wall, moderate concentric LVH, moderate AS, dilated ascending aorta 4.4cm, grade I DD.

## 2024-10-16 NOTE — TELEPHONE ENCOUNTER
Upon review of the In Basket request we were able to locate, review, and update the patient chart as requested for Diabetic Eye Exam.    Any additional questions or concerns should be emailed to the Practice Liaisons via the appropriate education email address, please do not reply via In Basket.    Thank you  Noel Rockwell MA   [facility]

## 2024-10-16 NOTE — TELEPHONE ENCOUNTER
As a final attempt, a third outreach has been made via telephone call to facility. Please see Contacts section for details. This encounter will be closed and completed by end of day. Should we receive the requested information because of previous outreach attempts, the requested patient's chart will be updated appropriately.     Thank you  Noel Rockwell MA

## 2024-10-28 DIAGNOSIS — E66.813 CLASS 3 SEVERE OBESITY DUE TO EXCESS CALORIES WITH SERIOUS COMORBIDITY AND BODY MASS INDEX (BMI) OF 40.0 TO 44.9 IN ADULT (HCC): ICD-10-CM

## 2024-10-28 DIAGNOSIS — E66.01 CLASS 3 SEVERE OBESITY DUE TO EXCESS CALORIES WITH SERIOUS COMORBIDITY AND BODY MASS INDEX (BMI) OF 40.0 TO 44.9 IN ADULT (HCC): ICD-10-CM

## 2024-10-28 RX ORDER — TIRZEPATIDE 2.5 MG/.5ML
2.5 INJECTION, SOLUTION SUBCUTANEOUS WEEKLY
Qty: 2 ML | Refills: 0 | Status: SHIPPED | OUTPATIENT
Start: 2024-10-28 | End: 2024-11-25

## 2024-10-28 NOTE — TELEPHONE ENCOUNTER
Patient called to request a refill for their tirzepatide (Zepbound) 2.5 mg/0.5 mL auto-injector  advised a refill was requested on 10/2028/2024 and is pending approval. Patient verbalized understanding and is in agreement.

## 2024-11-22 ENCOUNTER — TELEPHONE (OUTPATIENT)
Dept: INTERNAL MEDICINE CLINIC | Facility: CLINIC | Age: 59
End: 2024-11-22

## 2024-11-22 DIAGNOSIS — E66.813 CLASS 3 SEVERE OBESITY DUE TO EXCESS CALORIES WITH SERIOUS COMORBIDITY AND BODY MASS INDEX (BMI) OF 40.0 TO 44.9 IN ADULT (HCC): ICD-10-CM

## 2024-11-22 DIAGNOSIS — E66.01 CLASS 3 SEVERE OBESITY DUE TO EXCESS CALORIES WITH SERIOUS COMORBIDITY AND BODY MASS INDEX (BMI) OF 40.0 TO 44.9 IN ADULT (HCC): ICD-10-CM

## 2024-11-22 DIAGNOSIS — E55.9 VITAMIN D DEFICIENCY: Primary | ICD-10-CM

## 2024-11-22 RX ORDER — TIRZEPATIDE 2.5 MG/.5ML
2.5 INJECTION, SOLUTION SUBCUTANEOUS WEEKLY
Qty: 2 ML | Refills: 0 | Status: SHIPPED | OUTPATIENT
Start: 2024-11-22 | End: 2024-12-20

## 2024-11-22 NOTE — TELEPHONE ENCOUNTER
Reason for call:   [x] Refill   [] Prior Auth  [] Other:     Office:   [x] PCP/Provider -   [] Specialty/Provider -     Medication: tirzepatide (Zepbound) 2.5 mg       Dose/Frequency: : Inject 0.5 mL (2.5 mg total) under the skin once a week for 28 days     Quantity: 2 mL    Pharmacy: [id number] - [place] - [address]     Does the patient have enough for 3 days?   [] Yes   [x] No - Send as HP to POD

## 2024-11-22 NOTE — TELEPHONE ENCOUNTER
Patient called the RX Refill Line. Message is being forwarded to the office.     Patient would like to know if he should continue the vitamin D3. If so he will need a refill     Please contact patient at [phone]

## 2024-11-22 NOTE — TELEPHONE ENCOUNTER
Pt was called and informed   Agreeable to lab  Lab printed and placed in mail for pt, along with other abs placed 9/13/24 at pts request

## 2024-11-27 ENCOUNTER — TELEPHONE (OUTPATIENT)
Age: 59
End: 2024-11-27

## 2024-11-27 NOTE — TELEPHONE ENCOUNTER
Pt would like a call back as he would like her opinion on something that he found out, as this was all he would tell me, please call when she is available. He will not be available between 10-11

## 2024-12-04 ENCOUNTER — RESULTS FOLLOW-UP (OUTPATIENT)
Dept: INTERNAL MEDICINE CLINIC | Facility: CLINIC | Age: 59
End: 2024-12-04

## 2024-12-04 LAB
25(OH)D3+25(OH)D2 SERPL-MCNC: 24.3 NG/ML (ref 30–100)
BASOPHILS # BLD AUTO: 0 X10E3/UL (ref 0–0.2)
BASOPHILS NFR BLD AUTO: 1 %
BUN SERPL-MCNC: 12 MG/DL (ref 8–27)
BUN/CREAT SERPL: 14 (ref 10–24)
CALCIUM SERPL-MCNC: 9 MG/DL (ref 8.6–10.2)
CHLORIDE SERPL-SCNC: 107 MMOL/L (ref 96–106)
CO2 SERPL-SCNC: 22 MMOL/L (ref 20–29)
CREAT SERPL-MCNC: 0.85 MG/DL (ref 0.76–1.27)
EGFR: 99 ML/MIN/1.73
EOSINOPHIL # BLD AUTO: 0.2 X10E3/UL (ref 0–0.4)
EOSINOPHIL NFR BLD AUTO: 3 %
ERYTHROCYTE [DISTWIDTH] IN BLOOD BY AUTOMATED COUNT: 12.5 % (ref 11.6–15.4)
GLUCOSE SERPL-MCNC: 92 MG/DL (ref 70–99)
HBA1C MFR BLD: 5.8 % (ref 4.8–5.6)
HCT VFR BLD AUTO: 43.6 % (ref 37.5–51)
HGB BLD-MCNC: 13.6 G/DL (ref 13–17.7)
IMM GRANULOCYTES # BLD: 0 X10E3/UL (ref 0–0.1)
IMM GRANULOCYTES NFR BLD: 0 %
LYMPHOCYTES # BLD AUTO: 2.4 X10E3/UL (ref 0.7–3.1)
LYMPHOCYTES NFR BLD AUTO: 40 %
MCH RBC QN AUTO: 30 PG (ref 26.6–33)
MCHC RBC AUTO-ENTMCNC: 31.2 G/DL (ref 31.5–35.7)
MCV RBC AUTO: 96 FL (ref 79–97)
MONOCYTES # BLD AUTO: 0.4 X10E3/UL (ref 0.1–0.9)
MONOCYTES NFR BLD AUTO: 7 %
NEUTROPHILS # BLD AUTO: 3 X10E3/UL (ref 1.4–7)
NEUTROPHILS NFR BLD AUTO: 49 %
PLATELET # BLD AUTO: 263 X10E3/UL (ref 150–450)
POTASSIUM SERPL-SCNC: 4 MMOL/L (ref 3.5–5.2)
PSA FREE MFR SERPL: 25 %
PSA FREE SERPL-MCNC: 0.1 NG/ML
PSA SERPL-MCNC: 0.4 NG/ML (ref 0–4)
RBC # BLD AUTO: 4.53 X10E6/UL (ref 4.14–5.8)
SODIUM SERPL-SCNC: 143 MMOL/L (ref 134–144)
WBC # BLD AUTO: 6 X10E3/UL (ref 3.4–10.8)

## 2024-12-17 DIAGNOSIS — I10 PRIMARY HYPERTENSION: ICD-10-CM

## 2024-12-18 RX ORDER — LISINOPRIL 20 MG/1
20 TABLET ORAL DAILY
Qty: 30 TABLET | Refills: 5 | Status: SHIPPED | OUTPATIENT
Start: 2024-12-18

## 2024-12-27 ENCOUNTER — TELEPHONE (OUTPATIENT)
Age: 59
End: 2024-12-27

## 2024-12-27 DIAGNOSIS — E66.813 CLASS 3 SEVERE OBESITY DUE TO EXCESS CALORIES WITH SERIOUS COMORBIDITY AND BODY MASS INDEX (BMI) OF 40.0 TO 44.9 IN ADULT (HCC): Primary | ICD-10-CM

## 2024-12-27 DIAGNOSIS — E66.01 CLASS 3 SEVERE OBESITY DUE TO EXCESS CALORIES WITH SERIOUS COMORBIDITY AND BODY MASS INDEX (BMI) OF 40.0 TO 44.9 IN ADULT (HCC): Primary | ICD-10-CM

## 2024-12-27 RX ORDER — TIRZEPATIDE 2.5 MG/.5ML
2.5 INJECTION, SOLUTION SUBCUTANEOUS WEEKLY
Qty: 2 ML | Refills: 0 | Status: SHIPPED | OUTPATIENT
Start: 2024-12-27 | End: 2025-01-24

## 2024-12-27 NOTE — TELEPHONE ENCOUNTER
Patient is asking to have his labs faxed over to [facility] in [place] the Monday before his appointment

## 2024-12-27 NOTE — TELEPHONE ENCOUNTER
Patient is asking for a refill of  tirzepatide (Zepbound) 2.5 mg/0.5 mL auto-injector [[id number]]

## 2025-01-27 ENCOUNTER — OFFICE VISIT (OUTPATIENT)
Dept: INTERNAL MEDICINE CLINIC | Facility: CLINIC | Age: 60
End: 2025-01-27
Payer: COMMERCIAL

## 2025-01-27 VITALS
SYSTOLIC BLOOD PRESSURE: 134 MMHG | HEART RATE: 75 BPM | OXYGEN SATURATION: 99 % | WEIGHT: 291 LBS | DIASTOLIC BLOOD PRESSURE: 82 MMHG | HEIGHT: 70 IN | BODY MASS INDEX: 41.66 KG/M2

## 2025-01-27 DIAGNOSIS — I10 PRIMARY HYPERTENSION: ICD-10-CM

## 2025-01-27 DIAGNOSIS — K91.2 POSTGASTRECTOMY MALABSORPTION: ICD-10-CM

## 2025-01-27 DIAGNOSIS — E78.2 MIXED HYPERLIPIDEMIA: ICD-10-CM

## 2025-01-27 DIAGNOSIS — Z90.3 POSTGASTRECTOMY MALABSORPTION: ICD-10-CM

## 2025-01-27 DIAGNOSIS — E66.01 MORBID OBESITY WITH BMI OF 40.0-44.9, ADULT (HCC): ICD-10-CM

## 2025-01-27 DIAGNOSIS — M79.602 PAIN OF LEFT UPPER EXTREMITY: ICD-10-CM

## 2025-01-27 DIAGNOSIS — R07.9 CHEST PAIN, UNSPECIFIED TYPE: ICD-10-CM

## 2025-01-27 DIAGNOSIS — E11.21 CONTROLLED TYPE 2 DIABETES MELLITUS WITH DIABETIC NEPHROPATHY, WITHOUT LONG-TERM CURRENT USE OF INSULIN (HCC): Primary | ICD-10-CM

## 2025-01-27 PROCEDURE — 99214 OFFICE O/P EST MOD 30 MIN: CPT | Performed by: INTERNAL MEDICINE

## 2025-01-27 RX ORDER — LISINOPRIL 20 MG/1
20 TABLET ORAL DAILY
Qty: 30 TABLET | Refills: 5 | Status: SHIPPED | OUTPATIENT
Start: 2025-01-27

## 2025-01-27 RX ORDER — TIRZEPATIDE 5 MG/.5ML
5 INJECTION, SOLUTION SUBCUTANEOUS WEEKLY
Qty: 2 ML | Refills: 0 | Status: SHIPPED | OUTPATIENT
Start: 2025-01-27

## 2025-01-27 RX ORDER — ATORVASTATIN CALCIUM 40 MG/1
40 TABLET, FILM COATED ORAL DAILY
Qty: 30 TABLET | Refills: 5 | Status: SHIPPED | OUTPATIENT
Start: 2025-01-27

## 2025-01-27 NOTE — PROGRESS NOTES
Patient's shoes and socks removed.    Right Foot/Ankle   Right Foot Inspection  Skin Exam: skin normal, skin intact and dry skin. No warmth, no callus, no erythema, no maceration, no abnormal color, no pre-ulcer, no ulcer and no callus.     Toe Exam: ROM and strength within normal limits.     Sensory   Monofilament testing: intact    Vascular  The right DP pulse is 2+.     Right Toe  - Comprehensive Exam  Ecchymosis: none  Swelling: none       Left Foot/Ankle  Left Foot Inspection  Skin Exam: skin normal, skin intact and dry skin. No warmth, no erythema, no maceration, normal color, no pre-ulcer, no ulcer and no callus.     Toe Exam: ROM and strength within normal limits.     Sensory   Monofilament testing: intact    Vascular  The left DP pulse is 2+.     Left Toe  - Comprehensive Exam  Ecchymosis: none  Swelling: none       Assign Risk Category  No deformity present  No loss of protective sensation  No weak pulses  Risk: 0      Awake

## 2025-01-27 NOTE — PROGRESS NOTES
Name: Guille Macias      : 1965      MRN: [id number]  Encounter Provider: Lazaro Prescott MD  Encounter Date: 2025   Encounter department: [facility] [place]  :  Assessment & Plan  Controlled type 2 diabetes mellitus with diabetic nephropathy, without long-term current use of insulin (HCC)  Controlled. Continue current regimen.  Lab Results   Component Value Date    HGBA1C 5.8 (H) 2024       Orders:    tirzepatide (Zepbound) 5 mg/0.5 mL auto-injector; Inject 0.5 mL (5 mg total) under the skin once a week    Postgastrectomy malabsorption  Patient has not seen weight management, he is at risk for malabsorption of vitamins/minerals secondary to malabsorption and restriction of intake from bariatric surgery. He is taking taking adequate postop bariatric surgery vitamin supplementation: calcium citrate 500mg, B12, M       Primary hypertension  Controlled. Continue lisinopril.  Orders:    lisinopril (ZESTRIL) 20 mg tablet; Take 1 tablet (20 mg total) by mouth daily    Chest pain, unspecified type  Started a few weeks ago, starts mid sternal and radiates to left shoulder blade, comes on any time of the day, denies exertional cp, denies sob, palpitations. Will obtain stress testing.  Orders:    Stress test only, exercise; Future    Pain of left upper extremity  See above. Associated with chest pain.       Mixed hyperlipidemia  The 10-year ASCVD risk score (Violette DK, et al., 2019) is: 38.9%    Values used to calculate the score:      Age: 59 years      Sex: Male      Is Non- : Yes      Diabetic: Yes      Tobacco smoker: Yes      Systolic Blood Pressure: 134 mmHg      Is BP treated: Yes      HDL Cholesterol: 48 mg/dL      Total Cholesterol: 141 mg/dL    Orders:    atorvastatin (LIPITOR) 40 mg tablet; Take 1 tablet (40 mg total) by mouth daily    Morbid obesity with BMI of 40.0-44.9, adult (HCC)  Prior Authorization Clinical Questions for Weight  Management Pharmacotherapy    2. Does the patient have a diagnosis of obesity, confirmed by a BMI greater than or equal to 30 kg/m^2?: Yes  3. Does the patient have a BMI of greater than or equal to 27 kg/m^2 with at least one weight-related comorbidity/risk factor/complication (e.g. diabetes, dyslipidemia, coronary artery disease)?: Yes  4. Weight-related co-morbidities/risk factors: type 2 diabetes  7. Does the patient have a history of type 2 diabetes?: Yes  10. Is the medication a controlled substance?: No  For renewals: Has the patient had a positive outcome with current weight management medication (i.e., change in body weight of at least 4-5% after 12-16 weeks on maximally tolerated dose)?: Yes     Baseline weight (in pounds): 331 lbs  Current weight (in pounds): 291 lbs  Weight loss percentage: -12.08%       Approved until June 2025.  Orders:    tirzepatide (Zepbound) 5 mg/0.5 mL auto-injector; Inject 0.5 mL (5 mg total) under the skin once a week          Depression Screening and Follow-up Plan: Patient was screened for depression during today's encounter. They screened negative with a PHQ-2 score of 3.  Patient with underlying depression and was advised to continue current medications as prescribed.   Tobacco Cessation Counseling: Tobacco cessation counseling was provided. The patient is sincerely urged to quit consumption of tobacco. He is not ready to quit tobacco. Medication options discussed.     History of Present Illness   Here to discus few concerns.    Review of Systems   Constitutional:  Negative for chills and fever.   HENT:  Negative for ear pain and sore throat.    Eyes:  Negative for pain and visual disturbance.   Respiratory:  Negative for cough and shortness of breath.    Cardiovascular:  Negative for chest pain and palpitations.   Gastrointestinal:  Negative for abdominal pain and vomiting.   Genitourinary:  Negative for dysuria and hematuria.   Musculoskeletal:  Negative for arthralgias  "and back pain.   Skin:  Negative for color change and rash.   Neurological:  Negative for seizures and syncope.   All other systems reviewed and are negative.      Objective   /82 (BP Location: Left arm, Patient Position: Sitting, Cuff Size: Large)   Pulse 75   Ht 5' 10\" (1.778 m)   Wt 132 kg (291 lb)   SpO2 99%   BMI 41.75 kg/m²      Physical Exam  Vitals and nursing note reviewed.   Constitutional:       General: He is not in acute distress.     Appearance: He is well-developed. He is obese.   HENT:      Head: Normocephalic and atraumatic.   Cardiovascular:      Rate and Rhythm: Normal rate and regular rhythm.      Heart sounds: No murmur heard.  Pulmonary:      Effort: Pulmonary effort is normal. No respiratory distress.      Breath sounds: Normal breath sounds.   Abdominal:      Tenderness: There is no abdominal tenderness.   Musculoskeletal:         General: No swelling.      Cervical back: Neck supple.   Skin:     General: Skin is warm and dry.      Capillary Refill: Capillary refill takes less than 2 seconds.   Neurological:      Mental Status: He is alert.   Psychiatric:         Mood and Affect: Mood normal.       "

## 2025-01-27 NOTE — ASSESSMENT & PLAN NOTE
Controlled. Continue current regimen.  Lab Results   Component Value Date    HGBA1C 5.8 (H) 12/03/2024       Orders:    tirzepatide (Zepbound) 5 mg/0.5 mL auto-injector; Inject 0.5 mL (5 mg total) under the skin once a week

## 2025-01-27 NOTE — ASSESSMENT & PLAN NOTE
Patient has not seen weight management, he is at risk for malabsorption of vitamins/minerals secondary to malabsorption and restriction of intake from bariatric surgery. He is taking taking adequate postop bariatric surgery vitamin supplementation: calcium citrate 500mg, B12, M

## 2025-01-29 ENCOUNTER — APPOINTMENT (EMERGENCY)
Dept: RADIOLOGY | Facility: HOSPITAL | Age: 60
End: 2025-01-29
Payer: COMMERCIAL

## 2025-01-29 ENCOUNTER — HOSPITAL ENCOUNTER (EMERGENCY)
Facility: HOSPITAL | Age: 60
Discharge: HOME | End: 2025-01-29
Attending: EMERGENCY MEDICINE
Payer: COMMERCIAL

## 2025-01-29 ENCOUNTER — APPOINTMENT (EMERGENCY)
Dept: CT IMAGING | Facility: HOSPITAL | Age: 60
End: 2025-01-29
Payer: COMMERCIAL

## 2025-01-29 VITALS
TEMPERATURE: 97.7 F | SYSTOLIC BLOOD PRESSURE: 139 MMHG | DIASTOLIC BLOOD PRESSURE: 70 MMHG | RESPIRATION RATE: 15 BRPM | HEART RATE: 53 BPM | OXYGEN SATURATION: 99 %

## 2025-01-29 DIAGNOSIS — R07.9 CHEST PAIN, UNSPECIFIED: Primary | ICD-10-CM

## 2025-01-29 LAB
2HR DELTA HS TROPONIN: 2 NG/L
ALBUMIN SERPL BCG-MCNC: 3.8 G/DL (ref 3.5–5)
ALP SERPL-CCNC: 77 U/L (ref 34–104)
ALT SERPL W P-5'-P-CCNC: 16 U/L (ref 7–52)
ANION GAP SERPL CALCULATED.3IONS-SCNC: 4 MMOL/L (ref 4–13)
AST SERPL W P-5'-P-CCNC: 13 U/L (ref 13–39)
ATRIAL RATE: 48 BPM
ATRIAL RATE: 51 BPM
ATRIAL RATE: 54 BPM
ATRIAL RATE: 56 BPM
BASOPHILS # BLD AUTO: 0.03 THOUSANDS/ΜL (ref 0–0.1)
BASOPHILS NFR BLD AUTO: 1 % (ref 0–1)
BILIRUB SERPL-MCNC: 0.44 MG/DL (ref 0.2–1)
BNP SERPL-MCNC: 51 PG/ML (ref 0–100)
BUN SERPL-MCNC: 12 MG/DL (ref 5–25)
CALCIUM SERPL-MCNC: 8 MG/DL (ref 8.4–10.2)
CARDIAC TROPONIN I PNL SERPL HS: 10 NG/L (ref ?–50)
CARDIAC TROPONIN I PNL SERPL HS: 8 NG/L (ref ?–50)
CHLORIDE SERPL-SCNC: 108 MMOL/L (ref 96–108)
CO2 SERPL-SCNC: 26 MMOL/L (ref 21–32)
CREAT SERPL-MCNC: 0.82 MG/DL (ref 0.6–1.3)
EOSINOPHIL # BLD AUTO: 0.27 THOUSAND/ΜL (ref 0–0.61)
EOSINOPHIL NFR BLD AUTO: 5 % (ref 0–6)
ERYTHROCYTE [DISTWIDTH] IN BLOOD BY AUTOMATED COUNT: 13.5 % (ref 11.6–15.1)
GFR SERPL CREATININE-BSD FRML MDRD: 95 ML/MIN/1.73SQ M
GLUCOSE SERPL-MCNC: 88 MG/DL (ref 65–140)
HCT VFR BLD AUTO: 39.1 % (ref 36.5–49.3)
HGB BLD-MCNC: 12.6 G/DL (ref 12–17)
IMM GRANULOCYTES # BLD AUTO: 0.02 THOUSAND/UL (ref 0–0.2)
IMM GRANULOCYTES NFR BLD AUTO: 0 % (ref 0–2)
LYMPHOCYTES # BLD AUTO: 2.23 THOUSANDS/ΜL (ref 0.6–4.47)
LYMPHOCYTES NFR BLD AUTO: 37 % (ref 14–44)
MCH RBC QN AUTO: 29.9 PG (ref 26.8–34.3)
MCHC RBC AUTO-ENTMCNC: 32.2 G/DL (ref 31.4–37.4)
MCV RBC AUTO: 93 FL (ref 82–98)
MONOCYTES # BLD AUTO: 0.54 THOUSAND/ΜL (ref 0.17–1.22)
MONOCYTES NFR BLD AUTO: 9 % (ref 4–12)
NEUTROPHILS # BLD AUTO: 2.96 THOUSANDS/ΜL (ref 1.85–7.62)
NEUTS SEG NFR BLD AUTO: 48 % (ref 43–75)
NRBC BLD AUTO-RTO: 0 /100 WBCS
P AXIS: 51 DEGREES
P AXIS: 54 DEGREES
P AXIS: 56 DEGREES
P AXIS: 61 DEGREES
PLATELET # BLD AUTO: 218 THOUSANDS/UL (ref 149–390)
PMV BLD AUTO: 8.7 FL (ref 8.9–12.7)
POTASSIUM SERPL-SCNC: 3.9 MMOL/L (ref 3.5–5.3)
PR INTERVAL: 130 MS
PR INTERVAL: 134 MS
PR INTERVAL: 142 MS
PR INTERVAL: 144 MS
PROT SERPL-MCNC: 6.6 G/DL (ref 6.4–8.4)
QRS AXIS: 15 DEGREES
QRS AXIS: 4 DEGREES
QRS AXIS: 5 DEGREES
QRS AXIS: 8 DEGREES
QRSD INTERVAL: 92 MS
QRSD INTERVAL: 92 MS
QRSD INTERVAL: 94 MS
QRSD INTERVAL: 96 MS
QT INTERVAL: 416 MS
QT INTERVAL: 420 MS
QT INTERVAL: 424 MS
QT INTERVAL: 426 MS
QTC INTERVAL: 380 MS
QTC INTERVAL: 383 MS
QTC INTERVAL: 398 MS
QTC INTERVAL: 409 MS
RBC # BLD AUTO: 4.22 MILLION/UL (ref 3.88–5.62)
SODIUM SERPL-SCNC: 138 MMOL/L (ref 135–147)
T WAVE AXIS: -10 DEGREES
T WAVE AXIS: -10 DEGREES
T WAVE AXIS: -5 DEGREES
T WAVE AXIS: 2 DEGREES
VENTRICULAR RATE: 48 BPM
VENTRICULAR RATE: 51 BPM
VENTRICULAR RATE: 54 BPM
VENTRICULAR RATE: 56 BPM
WBC # BLD AUTO: 6.05 THOUSAND/UL (ref 4.31–10.16)

## 2025-01-29 PROCEDURE — 71045 X-RAY EXAM CHEST 1 VIEW: CPT

## 2025-01-29 PROCEDURE — 84484 ASSAY OF TROPONIN QUANT: CPT | Performed by: EMERGENCY MEDICINE

## 2025-01-29 PROCEDURE — 36415 COLL VENOUS BLD VENIPUNCTURE: CPT | Performed by: EMERGENCY MEDICINE

## 2025-01-29 PROCEDURE — 83880 ASSAY OF NATRIURETIC PEPTIDE: CPT | Performed by: EMERGENCY MEDICINE

## 2025-01-29 PROCEDURE — 99285 EMERGENCY DEPT VISIT HI MDM: CPT

## 2025-01-29 PROCEDURE — 80053 COMPREHEN METABOLIC PANEL: CPT | Performed by: EMERGENCY MEDICINE

## 2025-01-29 PROCEDURE — 93010 ELECTROCARDIOGRAM REPORT: CPT | Performed by: INTERNAL MEDICINE

## 2025-01-29 PROCEDURE — 99285 EMERGENCY DEPT VISIT HI MDM: CPT | Performed by: EMERGENCY MEDICINE

## 2025-01-29 PROCEDURE — 85025 COMPLETE CBC W/AUTO DIFF WBC: CPT | Performed by: EMERGENCY MEDICINE

## 2025-01-29 PROCEDURE — 93005 ELECTROCARDIOGRAM TRACING: CPT

## 2025-01-29 PROCEDURE — 74174 CTA ABD&PLVS W/CONTRAST: CPT

## 2025-01-29 PROCEDURE — 71275 CT ANGIOGRAPHY CHEST: CPT

## 2025-01-29 RX ORDER — SODIUM CHLORIDE 9 MG/ML
3 INJECTION INTRAVENOUS
Status: DISCONTINUED | OUTPATIENT
Start: 2025-01-29 | End: 2025-01-29 | Stop reason: HOSPADM

## 2025-01-29 RX ADMIN — IOHEXOL 100 ML: 350 INJECTION, SOLUTION INTRAVENOUS at 08:31

## 2025-01-29 NOTE — ED PROVIDER NOTES
Time reflects when diagnosis was documented in both MDM as applicable and the Disposition within this note       Time User Action Codes Description Comment    1/29/2025 10:51 AM Maryann Mares Add [R07.9] Chest pain, unspecified           ED Disposition       ED Disposition   Discharge    Condition   Stable    Date/Time   Wed Jan 29, 2025 10:51 AM    Comment   Guille LOJA [place] discharge to home/self care.                   Assessment & Plan       Medical Decision Making  59-year-old male with chest pain-will get cardiac workup and reassess.    Patient's cardiac workup unremarkable and heart score is 4.  Will put in an ambulatory referral to cardiology and instruct him to continue with his stress test on Tuesday as scheduled.  Return precautions given    Amount and/or Complexity of Data Reviewed  Labs: ordered. Decision-making details documented in ED Course.  Radiology: ordered.    Risk  Prescription drug management.        ED Course as of 01/29/25 1529   Wed Jan 29, 2025   1043 Delta 2hr hsTnI: 2   1055 Patient currently asymptomatic. No further chest pain noted. He is not on any beta blockers - will refer to cardiology. HEART score 4. He has a stress test scheduled for tuesday.        Medications   iohexol (OMNIPAQUE) 350 MG/ML injection (MULTI-DOSE) 100 mL (100 mL Intravenous Given 1/29/25 0831)       ED Risk Strat Scores   HEART Risk Score      Flowsheet Row Most Recent Value   Heart Score Risk Calculator    History 1 Filed at: 01/29/2025 0841   ECG 0 Filed at: 01/29/2025 0841   Age 1 Filed at: 01/29/2025 0841   Risk Factors 2 Filed at: 01/29/2025 0841   Troponin 0 Filed at: 01/29/2025 0841   HEART Score 4 Filed at: 01/29/2025 0841          HEART Risk Score      Flowsheet Row Most Recent Value   Heart Score Risk Calculator    History 1 Filed at: 01/29/2025 0841   ECG 0 Filed at: 01/29/2025 0841   Age 1 Filed at: 01/29/2025 0841   Risk Factors 2 Filed at: 01/29/2025 0841   Troponin 0 Filed at: 01/29/2025  0841   HEART Score 4 Filed at: 01/29/2025 0841                            SBIRT 20yo+      Flowsheet Row Most Recent Value   Initial Alcohol Screen: US AUDIT-C     1. How often do you have a drink containing alcohol? 0 Filed at: 01/29/2025 0806   2. How many drinks containing alcohol do you have on a typical day you are drinking?  0 Filed at: 01/29/2025 0806   3a. Male UNDER 65: How often do you have five or more drinks on one occasion? 0 Filed at: 01/29/2025 0806   Audit-C Score 0 Filed at: 01/29/2025 0806   CHRISTINA: How many times in the past year have you...    Used an illegal drug or used a prescription medication for non-medical reasons? Never Filed at: 01/29/2025 0806                            History of Present Illness       Chief Complaint   Patient presents with    Chest Pain     Left sided chest pain for over 1 month, reports worsening last night with pain traveling to left shoulder. Denies shortness of breath, denies any nausea or vomiting.        Past Medical History:   Diagnosis Date    Allergic     [facility]    Arthritis     Last assessed: 5/21/12    Diabetes mellitus (HCC) 2/20/07    Off and on for 15Years    History of bariatric surgery     Hypertension 2/14/22    Morbid obesity due to excess calories (HCC)     Last assessed: 5/21/12    Obesity     Postsurgical malabsorption     Type 2 diabetes mellitus (HCC)     Last assessed: 6/5/15    Vitamin B1 deficiency     Last assessed: 9/29/14    Vitamin D deficiency     Last assessed: 9/29/14      Past Surgical History:   Procedure Laterality Date    GASTRIC BYPASS      For Morbid Obesity. Managed by: José Miguel Deleon. Last assessed: 6/16/14    HERNIA REPAIR        Family History   Problem Relation Age of Onset    Heart failure Mother         Congestive    Hypertension Mother     Heart disease Mother     Bone cancer Father     Throat cancer Father         Laryngeal    Diabetes Neg Hx     Thyroid disease Neg Hx     Stroke Neg Hx       Social History      Tobacco Use    Smoking status: Some Days     Current packs/day: 0.00     Average packs/day: 0.3 packs/day for 10.0 years (2.5 ttl pk-yrs)     Types: Cigarettes     Start date: 2000     Last attempt to quit: 2010     Years since quittin.8    Smokeless tobacco: Never    Tobacco comments:     occasionally every couple days   Vaping Use    Vaping status: Never Used   Substance Use Topics    Alcohol use: Yes     Alcohol/week: 6.0 standard drinks of alcohol     Types: 2 Cans of beer, 4 Shots of liquor per week     Comment: Social    Drug use: No      E-Cigarette/Vaping    E-Cigarette Use Never User       E-Cigarette/Vaping Substances    Nicotine No     THC No     CBD No     Flavoring No     Other No     Unknown No       I have reviewed and agree with the history as documented.     58 y/o male, hx of HTN and HLD, presents to the ED for chest pain x 1 month. States that he has had intermittent midsternal chest pain that radiates to his back and shoulder. He states that it lasts a min when it comes on and then resolves. Nothing worsens or improves the pain. Denies any sob, n/v, diaphoresis, leg swelling, or abd pain. No association with exertion. He saw his PCP 2 days ago and has a stress test scheduled for Tuesday. No other complaints.       History provided by:  Patient  Chest Pain  Pain location:  Substernal area  Pain radiates to:  L shoulder and upper back  Timing:  Intermittent  Chronicity:  New  Relieved by:  None tried  Worsened by:  Nothing tried  Ineffective treatments:  None tried  Associated symptoms: no abdominal pain, no cough, no fever, no headache, no nausea, no numbness, no shortness of breath, not vomiting and no weakness        Review of Systems   Constitutional:  Negative for chills and fever.   HENT:  Negative for congestion, ear pain and sore throat.    Eyes:  Negative for pain and visual disturbance.   Respiratory:  Negative for cough, shortness of breath and wheezing.     Cardiovascular:  Positive for chest pain. Negative for leg swelling.   Gastrointestinal:  Negative for abdominal pain, diarrhea, nausea and vomiting.   Genitourinary:  Negative for dysuria, frequency, hematuria and urgency.   Musculoskeletal:  Negative for neck pain and neck stiffness.   Skin:  Negative for rash and wound.   Neurological:  Negative for weakness, numbness and headaches.   Psychiatric/Behavioral:  Negative for agitation and confusion.    All other systems reviewed and are negative.          Objective       ED Triage Vitals [01/29/25 0728]   Temperature Pulse Blood Pressure Respirations SpO2 Patient Position - Orthostatic VS   97.7 °F (36.5 °C) (!) 50 (!) 171/99 19 100 % Sitting      Temp Source Heart Rate Source BP Location FiO2 (%) Pain Score    Oral Monitor Left arm -- --      Vitals      Date and Time Temp Pulse SpO2 Resp BP Pain Score FACES Pain Rating User   01/29/25 0915 -- 53 99 % 15 139/70 -- -- LM   01/29/25 0800 -- 53 99 % 18 151/75 -- -- TS   01/29/25 0728 97.7 °F (36.5 °C) 50 100 % 19 171/99 -- -- AMADOU            Physical Exam  Vitals and nursing note reviewed.   Constitutional:       Appearance: He is well-developed.   HENT:      Head: Normocephalic and atraumatic.   Eyes:      Pupils: Pupils are equal, round, and reactive to light.   Cardiovascular:      Rate and Rhythm: Normal rate and regular rhythm.   Pulmonary:      Effort: Pulmonary effort is normal.      Breath sounds: Normal breath sounds.   Abdominal:      General: Bowel sounds are normal.      Palpations: Abdomen is soft.   Musculoskeletal:         General: Normal range of motion.      Cervical back: Normal range of motion and neck supple.   Skin:     General: Skin is warm and dry.   Neurological:      General: No focal deficit present.      Mental Status: He is alert and oriented to person, place, and time.      Comments: No focal deficits         Results Reviewed       Procedure Component Value Units Date/Time    HS Troponin I  2hr [[id number]]  (Normal) Collected: 01/29/25 1006    Lab Status: Final result Specimen: Blood from Arm, Right Updated: 01/29/25 1035     hs TnI 2hr 10 ng/L      Delta 2hr hsTnI 2 ng/L     B-Type Natriuretic Peptide(BNP) [[id number]]  (Normal) Collected: 01/29/25 0746    Lab Status: Final result Specimen: Blood from Arm, Right Updated: 01/29/25 0820     BNP 51 pg/mL     HS Troponin 0hr (reflex protocol) [[id number]]  (Normal) Collected: 01/29/25 0746    Lab Status: Final result Specimen: Blood from Arm, Right Updated: 01/29/25 0815     hs TnI 0hr 8 ng/L     Comprehensive metabolic panel [[id number]]  (Abnormal) Collected: 01/29/25 0746    Lab Status: Final result Specimen: Blood from Arm, Right Updated: 01/29/25 0810     Sodium 138 mmol/L      Potassium 3.9 mmol/L      Chloride 108 mmol/L      CO2 26 mmol/L      ANION GAP 4 mmol/L      BUN 12 mg/dL      Creatinine 0.82 mg/dL      Glucose 88 mg/dL      Calcium 8.0 mg/dL      AST 13 U/L      ALT 16 U/L      Alkaline Phosphatase 77 U/L      Total Protein 6.6 g/dL      Albumin 3.8 g/dL      Total Bilirubin 0.44 mg/dL      eGFR 95 ml/min/1.73sq m     Narrative:      National Kid[employer] Foundation guidelines for Chronic Kidney Disease (CKD):     Stage 1 with normal or high GFR (GFR > 90 mL/min/1.73 square meters)    Stage 2 Mild CKD (GFR = 60-89 mL/min/1.73 square meters)    Stage 3A Moderate CKD (GFR = 45-59 mL/min/1.73 square meters)    Stage 3B Moderate CKD (GFR = 30-44 mL/min/1.73 square meters)    Stage 4 Severe CKD (GFR = 15-29 mL/min/1.73 square meters)    Stage 5 End Stage CKD (GFR <15 mL/min/1.73 square meters)  Note: GFR calculation is accurate only with a steady state creatinine    CBC and differential [[id number]]  (Abnormal) Collected: 01/29/25 0746    Lab Status: Final result Specimen: Blood from Arm, Right Updated: 01/29/25 0753     WBC 6.05 Thousand/uL      RBC 4.22 Million/uL      Hemoglobin 12.6 g/dL      Hematocrit 39.1 %      MCV 93 fL      MCH 29.9 pg       MCHC 32.2 g/dL      RDW 13.5 %      MPV 8.7 fL      Platelets 218 Thousands/uL      nRBC 0 /100 WBCs      Segmented % 48 %      Immature Grans % 0 %      Lymphocytes % 37 %      Monocytes % 9 %      Eosinophils Relative 5 %      Basophils Relative 1 %      Absolute Neutrophils 2.96 Thousands/µL      Absolute Immature Grans 0.02 Thousand/uL      Absolute Lymphocytes 2.23 Thousands/µL      Absolute Monocytes 0.54 Thousand/µL      Eosinophils Absolute 0.27 Thousand/µL      Basophils Absolute 0.03 Thousands/µL             CTA dissection protocol chest/abdomen/pelvis   Final Interpretation by Eleazar Rodriguez MD (01/29 0857)      No acute findings in the chest, abdomen or pelvis.      Fat-containing umbilical hernia.         Workstation performed: [id number]         X-ray chest 1 view portable   Final Interpretation by Marquise Cherry MD (01/29 0933)      No acute cardiopulmonary disease.            Workstation performed: [id number]             ECG 12 Lead Documentation Only    Date/Time: 1/29/2025 7:46 AM    Performed by: Maryann Mares DO  Authorized by: Maryann Mares DO    Patient location:  ED  Rate:     ECG rate:  54    ECG rate assessment: bradycardic    Rhythm:     Rhythm: sinus bradycardia    Ectopy:     Ectopy: none    QRS:     QRS axis:  Normal    QRS intervals:  Normal  ST segments:     ST segments:  Normal  T waves:     T waves: normal        ED Medication and Procedure Management   Prior to Admission Medications   Prescriptions Last Dose Informant Patient Reported? Taking?   CALCIUM CITRATE PO  Self Yes No   Sig: Take by mouth   Cholecalciferol (Vitamin D3) 50 MCG (2000 UT) CAPS   No No   Sig: Take 1 capsule (2,000 Units total) by mouth daily   Cyanocobalamin (VITAMIN B-12) 5000 MCG SUBL  Self Yes No   Sig: Place under the tongue   EPINEPHrine (EPIPEN) 0.3 mg/0.3 mL SOAJ   No No   Sig: Inject 0.3 mL (0.3 mg total) into a muscle once for 1 dose   Insulin Pen Needle (Pen Needles) 32G X 4  MM MISC   No No   Sig: Use in the morning To use with Victoza   Multiple Vitamin (MULTI-VITAMIN DAILY) TABS  Self Yes No   Sig: Take by mouth   atorvastatin (LIPITOR) 40 mg tablet   No No   Sig: Take 1 tablet (40 mg total) by mouth daily   clobetasol (TEMOVATE) 0.05 % ointment   No No   Sig: Apply topically 2 (two) times a day   lisinopril (ZESTRIL) 20 mg tablet   No No   Sig: Take 1 tablet (20 mg total) by mouth daily   tirzepatide (Zepbound) 5 mg/0.5 mL auto-injector   No No   Sig: Inject 0.5 mL (5 mg total) under the skin once a week      Facility-Administered Medications Last Administration Doses Remaining   cyanocobalamin injection 1,000 mcg 4/25/2019 11:06 AM         Discharge Medication List as of 1/29/2025 10:54 AM        CONTINUE these medications which have NOT CHANGED    Details   atorvastatin (LIPITOR) 40 mg tablet Take 1 tablet (40 mg total) by mouth daily, Starting Mon 1/27/2025, Normal      CALCIUM CITRATE PO Take by mouth, Historical Med      Cholecalciferol (Vitamin D3) 50 MCG (2000 UT) CAPS Take 1 capsule (2,000 Units total) by mouth daily, Starting Thu 4/11/2024, Normal      clobetasol (TEMOVATE) 0.05 % ointment Apply topically 2 (two) times a day, Starting Fri 9/1/2023, Normal      Cyanocobalamin (VITAMIN B-12) 5000 MCG SUBL Place under the tongue, Historical Med      EPINEPHrine (EPIPEN) 0.3 mg/0.3 mL SOAJ Inject 0.3 mL (0.3 mg total) into a muscle once for 1 dose, Starting Fri 9/13/2024, Normal      Insulin Pen Needle (Pen Needles) 32G X 4 MM MISC Use in the morning To use with Victoza, Starting Mon 12/19/2022, Normal      lisinopril (ZESTRIL) 20 mg tablet Take 1 tablet (20 mg total) by mouth daily, Starting Mon 1/27/2025, Normal      Multiple Vitamin (MULTI-VITAMIN DAILY) TABS Take by mouth, Historical Med      tirzepatide (Zepbound) 5 mg/0.5 mL auto-injector Inject 0.5 mL (5 mg total) under the skin once a week, Starting Mon 1/27/2025, Normal             ED SEPSIS DOCUMENTATION   Time  reflects when diagnosis was documented in both MDM as applicable and the Disposition within this note       Time User Action Codes Description Comment    1/29/2025 10:51 AM Maryann Mares Add [R07.9] Chest pain, unspecified                  Maryann Mares DO  01/29/25 1529

## 2025-01-30 ENCOUNTER — HOSPITAL ENCOUNTER (OUTPATIENT)
Dept: NON INVASIVE DIAGNOSTICS | Facility: HOSPITAL | Age: 60
Discharge: HOME | End: 2025-01-30
Attending: INTERNAL MEDICINE
Payer: COMMERCIAL

## 2025-01-30 ENCOUNTER — TELEPHONE (OUTPATIENT)
Dept: INTERNAL MEDICINE CLINIC | Facility: CLINIC | Age: 60
End: 2025-01-30

## 2025-01-30 VITALS
SYSTOLIC BLOOD PRESSURE: 146 MMHG | HEART RATE: 56 BPM | DIASTOLIC BLOOD PRESSURE: 78 MMHG | OXYGEN SATURATION: 98 % | BODY MASS INDEX: 40.89 KG/M2 | WEIGHT: 285 LBS

## 2025-01-30 DIAGNOSIS — R07.9 CHEST PAIN, UNSPECIFIED TYPE: ICD-10-CM

## 2025-01-30 LAB
MAX HR PERCENT: 97 %
MAX HR: 155 BPM
RATE PRESSURE PRODUCT: NORMAL
SL CV STRESS RECOVERY BP: NORMAL MMHG
SL CV STRESS RECOVERY HR: 68 BPM
SL CV STRESS RECOVERY O2 SAT: 100 %
SL CV STRESS STAGE REACHED: 2
STRESS ANGINA INDEX: 0
STRESS BASELINE BP: NORMAL MMHG
STRESS BASELINE HR: 53 BPM
STRESS O2 SAT REST: 98 %
STRESS PEAK HR: 155 BPM
STRESS POST ESTIMATED WORKLOAD: 7 METS
STRESS POST EXERCISE DUR MIN: 6 MIN
STRESS POST EXERCISE DUR SEC: 0 SEC
STRESS POST O2 SAT PEAK: 99 %
STRESS POST PEAK BP: 186 MMHG

## 2025-01-30 PROCEDURE — 93018 CV STRESS TEST I&R ONLY: CPT | Performed by: INTERNAL MEDICINE

## 2025-01-30 PROCEDURE — 93016 CV STRESS TEST SUPVJ ONLY: CPT | Performed by: INTERNAL MEDICINE

## 2025-01-30 PROCEDURE — 93017 CV STRESS TEST TRACING ONLY: CPT

## 2025-01-30 NOTE — TELEPHONE ENCOUNTER
Patient was to the ER 01/29. He has a form to be filled out to return to work. The ER instructed him to have his pcp fill this out. He was here on 01/27 for an appointment.     Call back #[phone]    Placed in MA's bin

## 2025-01-31 ENCOUNTER — RESULTS FOLLOW-UP (OUTPATIENT)
Dept: INTERNAL MEDICINE CLINIC | Facility: CLINIC | Age: 60
End: 2025-01-31

## 2025-01-31 LAB
CHEST PAIN STATEMENT: NORMAL
MAX DIASTOLIC BP: 80 MMHG
MAX PREDICTED HEART RATE: 159 BPM
PROTOCOL NAME: NORMAL
REASON FOR TERMINATION: NORMAL
STRESS POST EXERCISE DUR MIN: 6 MIN
STRESS POST EXERCISE DUR SEC: 0 SEC
STRESS POST PEAK HR: 155 BPM
STRESS POST PEAK SYSTOLIC BP: 186 MMHG
TARGET HR FORMULA: NORMAL
TEST INDICATION: NORMAL

## 2025-02-20 DIAGNOSIS — E66.01 MORBID OBESITY WITH BMI OF 40.0-44.9, ADULT (HCC): ICD-10-CM

## 2025-02-20 DIAGNOSIS — E11.21 CONTROLLED TYPE 2 DIABETES MELLITUS WITH DIABETIC NEPHROPATHY, WITHOUT LONG-TERM CURRENT USE OF INSULIN (HCC): ICD-10-CM

## 2025-02-20 NOTE — TELEPHONE ENCOUNTER
Reason for call:   [x] Refill   [] Prior Auth  [] Other:     Office:   [x] PCP/Provider -   [] Specialty/Provider -     Medication: Tirzepatide 5 mg, inject 0.5 mL under the skin once a week       Pharmacy: [facility] [place]     Does the patient have enough for 3 days?   [x] Yes   [] No - Send as HP to POD

## 2025-02-21 RX ORDER — TIRZEPATIDE 5 MG/.5ML
5 INJECTION, SOLUTION SUBCUTANEOUS WEEKLY
Qty: 2 ML | Refills: 0 | Status: SHIPPED | OUTPATIENT
Start: 2025-02-21

## 2025-03-24 ENCOUNTER — TELEPHONE (OUTPATIENT)
Age: 60
End: 2025-03-24

## 2025-03-24 DIAGNOSIS — E11.21 CONTROLLED TYPE 2 DIABETES MELLITUS WITH DIABETIC NEPHROPATHY, WITHOUT LONG-TERM CURRENT USE OF INSULIN (HCC): ICD-10-CM

## 2025-03-24 DIAGNOSIS — E66.01 MORBID OBESITY WITH BMI OF 40.0-44.9, ADULT (HCC): ICD-10-CM

## 2025-03-24 RX ORDER — TIRZEPATIDE 7.5 MG/.5ML
7.5 INJECTION, SOLUTION SUBCUTANEOUS WEEKLY
Qty: 2 ML | Refills: 0 | Status: SHIPPED | OUTPATIENT
Start: 2025-03-24

## 2025-03-24 NOTE — TELEPHONE ENCOUNTER
Pt called requesting a refill of the Zepbound.  Pt currently on 5 mg/0.5 ml.  Not sure if he is to increase or stay on current dose.    Please send new prescription to [facility], [place] and advise patient

## 2025-03-26 ENCOUNTER — OFFICE VISIT (OUTPATIENT)
Dept: INTERNAL MEDICINE CLINIC | Facility: CLINIC | Age: 60
End: 2025-03-26
Payer: COMMERCIAL

## 2025-03-26 VITALS
BODY MASS INDEX: 40.66 KG/M2 | WEIGHT: 284 LBS | HEART RATE: 66 BPM | OXYGEN SATURATION: 96 % | RESPIRATION RATE: 16 BRPM | DIASTOLIC BLOOD PRESSURE: 70 MMHG | SYSTOLIC BLOOD PRESSURE: 130 MMHG | HEIGHT: 70 IN

## 2025-03-26 DIAGNOSIS — E11.21 CONTROLLED TYPE 2 DIABETES MELLITUS WITH DIABETIC NEPHROPATHY, WITHOUT LONG-TERM CURRENT USE OF INSULIN (HCC): ICD-10-CM

## 2025-03-26 DIAGNOSIS — E66.01 MORBID OBESITY WITH BMI OF 40.0-44.9, ADULT (HCC): ICD-10-CM

## 2025-03-26 DIAGNOSIS — K42.9 UMBILICAL HERNIA WITHOUT OBSTRUCTION AND WITHOUT GANGRENE: Primary | ICD-10-CM

## 2025-03-26 PROCEDURE — 99214 OFFICE O/P EST MOD 30 MIN: CPT | Performed by: INTERNAL MEDICINE

## 2025-03-26 NOTE — PROGRESS NOTES
Name: Guille Macias      : 1965      MRN: [id number]  Encounter Provider: Lazaro Prescott MD  Encounter Date: 3/26/2025   Encounter department: [facility] INTERNAL MEDICINE [place]  :  Assessment & Plan  Controlled type 2 diabetes mellitus with diabetic nephropathy, without long-term current use of insulin (HCC)  Continue zepbound. Ozempic and mounjaro weren't covered.  Lab Results   Component Value Date    HGBA1C 5.8 (H) 2024       Orders:    Albumin / creatinine urine ratio; Future    Morbid obesity with BMI of 40.0-44.9, adult (HCC)  Prior Authorization Clinical Questions for Weight Management Pharmacotherapy    2. Does the patient have a diagnosis of obesity, confirmed by a BMI greater than or equal to 30 kg/m^2?: Yes  3. Does the patient have a BMI of greater than or equal to 27 kg/m^2 with at least one weight-related comorbidity/risk factor/complication (e.g. diabetes, dyslipidemia, coronary artery disease)?: Yes  4. Weight-related co-morbidities/risk factors: type 2 diabetes  5. WEGOVY CVA Indication: Does patient have established documented cardiovascular disease (history of a prior heart attack (myocardial infarction), stroke, or symptomatic peripheral arterial disease (PAD)?: N/A  7. Has the patient been on a weight loss regimen of low-calorie diet, increased physical activity, and lifestyle modifications for a minimum of 6 months?: Yes  9. Does the patient have a history of type 2 diabetes?: Yes  For renewals: Has the patient had a positive outcome with current weight management medication (i.e., change in body weight of at least 4-5% after 12-16 weeks on maximally tolerated dose)?: Yes     Baseline weight (in pounds): 331 lbs  Current weight (in pounds): 284 lbs  Weight loss percentage: -14.2%              Umbilical hernia without obstruction and without gangrene    Orders:    Ambulatory Referral to General Surgery; Future          Depression Screening and Follow-up Plan:  "Patient was screened for depression during today's encounter. They screened negative with a PHQ-2 score of 0.      Tobacco Cessation Counseling: Tobacco cessation counseling was provided. The patient is sincerely urged to quit consumption of tobacco. He is ready to quit tobacco.       History of Present Illness   Routine for weight check      Review of Systems   Constitutional:  Negative for chills and fever.   HENT:  Negative for ear pain and sore throat.    Eyes:  Negative for pain and visual disturbance.   Respiratory:  Negative for cough and shortness of breath.    Cardiovascular:  Negative for chest pain and palpitations.   Gastrointestinal:  Negative for abdominal pain and vomiting.   Genitourinary:  Negative for dysuria and hematuria.   Musculoskeletal:  Negative for arthralgias and back pain.   Skin:  Negative for color change and rash.   Neurological:  Negative for seizures and syncope.   All other systems reviewed and are negative.      Objective   /70 (BP Location: Left arm, Patient Position: Sitting, Cuff Size: Adult)   Pulse 66   Resp 16   Ht 5' 10\" (1.778 m)   Wt 129 kg (284 lb)   SpO2 96%   BMI 40.75 kg/m²      Physical Exam  Vitals and nursing note reviewed.   Constitutional:       General: He is not in acute distress.     Appearance: He is well-developed. He is obese.   HENT:      Head: Normocephalic and atraumatic.   Cardiovascular:      Rate and Rhythm: Normal rate.      Heart sounds: No murmur heard.  Pulmonary:      Effort: Pulmonary effort is normal. No respiratory distress.      Breath sounds: Normal breath sounds.   Abdominal:      Tenderness: There is no abdominal tenderness.   Musculoskeletal:         General: No swelling.   Skin:     General: Skin is warm and dry.   Neurological:      Mental Status: He is alert.   Psychiatric:         Mood and Affect: Mood normal.         "

## 2025-03-26 NOTE — ASSESSMENT & PLAN NOTE
Continue zepbound. Ozempic and mounjaro weren't covered.  Lab Results   Component Value Date    HGBA1C 5.8 (H) 12/03/2024       Orders:    Albumin / creatinine urine ratio; Future

## 2025-03-31 ENCOUNTER — TELEPHONE (OUTPATIENT)
Age: 60
End: 2025-03-31

## 2025-03-31 NOTE — TELEPHONE ENCOUNTER
PA for Zepbound) 7.5 mg/0.5 mL auto-injector  APPROVED     Date(s) approved March 31, 2025 to March 31, 2026     Case #[id number]     Patient advised by          []MyChart Message  []Phone call   []LMOM  []L/M to call office as no active Communication consent on file  []Unable to leave detailed message as VM not approved on Communication consent       Pharmacy advised by    [x]Fax  []Phone call  []Secure Chat    Specialty Pharmacy    []     Approval letter scanned into Media Yes

## 2025-03-31 NOTE — TELEPHONE ENCOUNTER
PA for Zepbound) 7.5 mg/0.5 mL auto-injector SUBMITTED to     via    []CMM-KEY:   [x]Surescripts-Case ID # [id number]   []Availity-Auth ID # NDC #   []Faxed to plan   []Other website   []Phone call Case ID #     [x]PA sent as URGENT    All office notes, labs and other pertaining documents and studies sent. Clinical questions answered. Awaiting determination from insurance company.     Turnaround time for your insurance to make a decision on your Prior Authorization can take 7-21 business days.

## 2025-04-10 NOTE — PROGRESS NOTES
Name: Guille Macias      : 1965      MRN: [id number]  Encounter Provider: NEAL Mendoza  Encounter Date: 2025   [facility]  :  Assessment & Plan  Umbilical hernia without obstruction and without gangrene  Reviewed images with Dr. Og. Patient has a large recurrent reducible umbilical hernia with a smaller neck of about 2cm with some discomfort occasionally but is getting bigger. There was mesh used in the prior open umbilical hernia repair. Patient has been continuously working on lowering his BMI. Was 450lbs and now 283lbs since his gastric bypass surgery. Provided options to patient of observation vs surgical repair. At this time, patient just started lifting again and has been steadily losing weight recently. Would like to hold off for a little and see if he can lower his BMI more. Discussed abdominal binder to help with any discomfort while lifting. Discussed s/s for incarceration/strangulation of when to report to ED. Will have him follow up in 3 months or sooner if becomes more symptomatic.   Orders:    Ambulatory Referral to General Surgery    Controlled type 2 diabetes mellitus with diabetic nephropathy, without long-term current use of insulin (HCC)  Currently on Zepbound.   Lab Results   Component Value Date    HGBA1C 5.8 (H) 2024          Obesity, Class III, BMI 40-49.9 (morbid obesity) (HCC)  History of Gastric Bypass surgery in . Currently on Zepbound. Down from 450lb to 283lb.          Prior abdominal surgery: Lap Gastric Bypass, open umbilical hernia repair with mesh prior to lap gastric bypass surgery    History of Present Illness   HPI  Guille Macias is a 59 y.o. male who presents with a large umbilical hernia. Had this repair over 10 years ago prior to his gastric bypass surgery but noticed the hernia came back not to long after repair. Increasingly larger over time but notices more bulging because  he is losing weight. Describes as discomfort when bending over or pushing on the area. Able to reduce the hernia but pops right back out. Normal bowel movements.    CTA - CHEST, ABDOMEN AND PELVIS - WITHOUT AND WITH IV CONTRAST 01/29/2025  ABDOMINAL WALL/INGUINAL REGIONS: 4.6 x 6.8 cm fat-containing umbilical hernia is identified. Hernia defect measures 1.8 cm.       Review of Systems   Constitutional:  Negative for chills and fever.   Eyes:  Negative for pain and visual disturbance.   Respiratory:  Negative for cough and shortness of breath.    Cardiovascular:  Negative for chest pain and palpitations.   Gastrointestinal:  Negative for abdominal pain and vomiting.   Genitourinary:  Negative for dysuria and hematuria.   Musculoskeletal:  Negative for arthralgias and back pain.   Skin:  Negative for color change and rash.   Neurological:  Negative for seizures and syncope.   All other systems reviewed and are negative.         Objective   There were no vitals taken for this visit.     Physical Exam  Vitals and nursing note reviewed.   Constitutional:       General: He is not in acute distress.     Appearance: He is well-developed.   HENT:      Head: Normocephalic and atraumatic.   Eyes:      Conjunctiva/sclera: Conjunctivae normal.   Cardiovascular:      Rate and Rhythm: Normal rate and regular rhythm.      Heart sounds: No murmur heard.  Pulmonary:      Effort: Pulmonary effort is normal. No respiratory distress.      Breath sounds: Normal breath sounds.   Abdominal:      General: There is no distension.      Palpations: Abdomen is soft.      Tenderness: There is no abdominal tenderness.      Comments: Large reducible supraumbilical hernia.    Musculoskeletal:         General: No swelling.      Cervical back: Neck supple.   Skin:     General: Skin is warm and dry.      Capillary Refill: Capillary refill takes less than 2 seconds.   Neurological:      Mental Status: He is alert and oriented to person, place, and  time.   Psychiatric:         Mood and Affect: Mood normal.

## 2025-04-14 ENCOUNTER — OFFICE VISIT (OUTPATIENT)
Dept: SURGERY | Facility: CLINIC | Age: 60
End: 2025-04-14
Payer: COMMERCIAL

## 2025-04-14 VITALS
WEIGHT: 283 LBS | TEMPERATURE: 97.4 F | HEART RATE: 64 BPM | OXYGEN SATURATION: 97 % | BODY MASS INDEX: 40.52 KG/M2 | RESPIRATION RATE: 16 BRPM | DIASTOLIC BLOOD PRESSURE: 78 MMHG | HEIGHT: 70 IN | SYSTOLIC BLOOD PRESSURE: 122 MMHG

## 2025-04-14 DIAGNOSIS — E11.21 CONTROLLED TYPE 2 DIABETES MELLITUS WITH DIABETIC NEPHROPATHY, WITHOUT LONG-TERM CURRENT USE OF INSULIN (HCC): ICD-10-CM

## 2025-04-14 DIAGNOSIS — K42.9 UMBILICAL HERNIA WITHOUT OBSTRUCTION AND WITHOUT GANGRENE: Primary | ICD-10-CM

## 2025-04-14 DIAGNOSIS — E66.01 MORBID OBESITY WITH BMI OF 40.0-44.9, ADULT (HCC): ICD-10-CM

## 2025-04-14 DIAGNOSIS — E66.813 OBESITY, CLASS III, BMI 40-49.9 (MORBID OBESITY): ICD-10-CM

## 2025-04-14 PROCEDURE — 99213 OFFICE O/P EST LOW 20 MIN: CPT

## 2025-04-14 NOTE — TELEPHONE ENCOUNTER
Medication: tirzepatide (Zepbound) 7.5 mg/0.5 mL auto-injector     Dose/Frequency: Inject 0.5 mL (7.5 mg total) under the skin once a week     Quantity: 2 mL     Pharmacy:  [id number] - [place] - [address]     Office:   [x] PCP/Provider -   [] Speciality/Provider -     Does the patient have enough for 3 days?   [x] Yes   [] No - Send as HP to POD

## 2025-04-15 RX ORDER — TIRZEPATIDE 7.5 MG/.5ML
7.5 INJECTION, SOLUTION SUBCUTANEOUS WEEKLY
Qty: 2 ML | Refills: 0 | Status: SHIPPED | OUTPATIENT
Start: 2025-04-15

## 2025-05-16 ENCOUNTER — TELEPHONE (OUTPATIENT)
Age: 60
End: 2025-05-16

## 2025-05-19 ENCOUNTER — TELEPHONE (OUTPATIENT)
Age: 60
End: 2025-05-19

## 2025-05-19 DIAGNOSIS — E66.01 MORBID OBESITY WITH BMI OF 40.0-44.9, ADULT (HCC): ICD-10-CM

## 2025-05-19 DIAGNOSIS — E11.21 CONTROLLED TYPE 2 DIABETES MELLITUS WITH DIABETIC NEPHROPATHY, WITHOUT LONG-TERM CURRENT USE OF INSULIN (HCC): ICD-10-CM

## 2025-05-19 DIAGNOSIS — I10 PRIMARY HYPERTENSION: ICD-10-CM

## 2025-05-19 RX ORDER — LISINOPRIL 20 MG/1
20 TABLET ORAL DAILY
Qty: 30 TABLET | Refills: 5 | Status: SHIPPED | OUTPATIENT
Start: 2025-05-19

## 2025-05-19 RX ORDER — TIRZEPATIDE 7.5 MG/.5ML
7.5 INJECTION, SOLUTION SUBCUTANEOUS WEEKLY
Qty: 2 ML | Refills: 0 | Status: SHIPPED | OUTPATIENT
Start: 2025-05-19 | End: 2025-05-20 | Stop reason: SDUPTHER

## 2025-05-19 NOTE — TELEPHONE ENCOUNTER
Pt called in requesting med refills of the following medications:    tirzepatide (Zepbound) 7.5 mg/0.5 mL auto-injector   lisinopril (ZESTRIL) 20 mg tablet     Scripts going to the following pharmacy:  [id number] - [place] - [address] [phone]     Please advise & call pt back with update on scripts. Thank you!    Guille: [phone]

## 2025-05-20 ENCOUNTER — TELEPHONE (OUTPATIENT)
Age: 60
End: 2025-05-20

## 2025-05-20 DIAGNOSIS — E11.21 CONTROLLED TYPE 2 DIABETES MELLITUS WITH DIABETIC NEPHROPATHY, WITHOUT LONG-TERM CURRENT USE OF INSULIN (HCC): ICD-10-CM

## 2025-05-20 DIAGNOSIS — E66.01 MORBID OBESITY WITH BMI OF 40.0-44.9, ADULT (HCC): ICD-10-CM

## 2025-05-20 RX ORDER — TIRZEPATIDE 7.5 MG/.5ML
7.5 INJECTION, SOLUTION SUBCUTANEOUS WEEKLY
Qty: 2 ML | Refills: 0 | Status: SHIPPED | OUTPATIENT
Start: 2025-05-20

## 2025-05-20 NOTE — TELEPHONE ENCOUNTER
Patient called,    Change pharmacy from [facility] to  [facility] pharmacy [place]    [facility] informed patient Zepbound 7.5 mg will not be filled, and to transfer the script to [facility]    tirzepatide (Zepbound) 7.5 mg/0.5 mL auto-injector Inject 0.5 mL (7.5 mg total) under the skin once a week 2 mL 0 ordered      May need to start PA for Zepbound 7.5 mg     Please advise and call patient with update. Thank you    Guille -- [phone]

## 2025-05-27 ENCOUNTER — TELEPHONE (OUTPATIENT)
Age: 60
End: 2025-05-27

## 2025-05-27 NOTE — TELEPHONE ENCOUNTER
Patient didn't get his labs done for appt on 06/02/25. Is it ok to do them that Monday on 6/2 or do you need them done before?     Please call patient at [phone] and let him know

## 2025-05-28 DIAGNOSIS — E11.21 CONTROLLED TYPE 2 DIABETES MELLITUS WITH DIABETIC NEPHROPATHY, WITHOUT LONG-TERM CURRENT USE OF INSULIN (HCC): ICD-10-CM

## 2025-05-28 DIAGNOSIS — E66.01 MORBID OBESITY WITH BMI OF 40.0-44.9, ADULT (HCC): ICD-10-CM

## 2025-05-28 RX ORDER — TIRZEPATIDE 7.5 MG/.5ML
7.5 INJECTION, SOLUTION SUBCUTANEOUS WEEKLY
Qty: 2 ML | Refills: 0 | Status: SHIPPED | OUTPATIENT
Start: 2025-05-28

## 2025-05-28 NOTE — TELEPHONE ENCOUNTER
Pens were not functioning properly so I am approving and sending to pharmacy for the voucher to be used to cover price from manufacture.

## 2025-06-02 ENCOUNTER — PREP FOR PROCEDURE (OUTPATIENT)
Age: 60
End: 2025-06-02

## 2025-06-02 ENCOUNTER — TELEPHONE (OUTPATIENT)
Age: 60
End: 2025-06-02

## 2025-06-02 ENCOUNTER — OFFICE VISIT (OUTPATIENT)
Dept: INTERNAL MEDICINE CLINIC | Facility: CLINIC | Age: 60
End: 2025-06-02
Payer: COMMERCIAL

## 2025-06-02 VITALS
BODY MASS INDEX: 39.43 KG/M2 | HEIGHT: 70 IN | DIASTOLIC BLOOD PRESSURE: 72 MMHG | SYSTOLIC BLOOD PRESSURE: 128 MMHG | HEART RATE: 64 BPM | OXYGEN SATURATION: 97 % | WEIGHT: 275.4 LBS

## 2025-06-02 DIAGNOSIS — E11.9 TYPE 2 DIABETES MELLITUS WITHOUT COMPLICATION, WITH LONG-TERM CURRENT USE OF INSULIN (HCC): Primary | ICD-10-CM

## 2025-06-02 DIAGNOSIS — Z12.11 SCREENING FOR COLON CANCER: Primary | ICD-10-CM

## 2025-06-02 DIAGNOSIS — Z79.4 TYPE 2 DIABETES MELLITUS WITHOUT COMPLICATION, WITH LONG-TERM CURRENT USE OF INSULIN (HCC): Primary | ICD-10-CM

## 2025-06-02 DIAGNOSIS — N52.01 ERECTILE DYSFUNCTION DUE TO ARTERIAL INSUFFICIENCY: ICD-10-CM

## 2025-06-02 PROCEDURE — 82043 UR ALBUMIN QUANTITATIVE: CPT | Performed by: INTERNAL MEDICINE

## 2025-06-02 PROCEDURE — 99214 OFFICE O/P EST MOD 30 MIN: CPT | Performed by: INTERNAL MEDICINE

## 2025-06-02 PROCEDURE — 82570 ASSAY OF URINE CREATININE: CPT | Performed by: INTERNAL MEDICINE

## 2025-06-02 RX ORDER — SILDENAFIL 50 MG/1
50 TABLET, FILM COATED ORAL DAILY PRN
Qty: 10 TABLET | Refills: 0 | Status: SHIPPED | OUTPATIENT
Start: 2025-06-02

## 2025-06-02 NOTE — TELEPHONE ENCOUNTER
Scheduled date of colonoscopy (as of today): 8/18/25  Physician performing colonoscopy: Brad  Location of colonoscopy: [place]   Bowel prep reviewed with patient: Allyson  Instructions via confirmed email and mailed home   Clearances: N/A    Pt is on Zepbound     06/02/25  Screened by: Cristina Flores MA    Referring Provider     Pre- Screening:     Confirmed Ht, Wt and BMI on file    Has patient been referred for a routine screening Colonoscopy? yes  Is the patient between 45-75 years old? yes      Previous Colonoscopy yes   If yes:    Date: 2020    Facility: [place]    Reason:       Does the patient want to see a Gastroenterologist prior to their procedure OR are they having any GI symptoms? no    Has the patient been hospitalized or had abdominal surgery in the past 6 months? no    Does the patient use supplemental oxygen? no    Does the patient take Coumadin, Lovenox, Plavix, Elliquis, Xarelto, or other blood thinning medication? no    Has the patient had a stroke, cardiac event, or stent placed in the past year? no      If patient is between 45yrs - 49yrs, please advise patient that we will have to confirm benefits & coverage with their insurance company for a routine screening colonoscopy.

## 2025-06-02 NOTE — TELEPHONE ENCOUNTER
Mailed colonoscopy prep instructions with Diabetic medication sheet, Zipbound, to patient's address in chart. 06/02/2025

## 2025-06-02 NOTE — PROGRESS NOTES
"Name: Guille Macias      : 1965      MRN: [id number]  Encounter Provider: Lazaro Prescott MD  Encounter Date: 2025   Encounter department: [facility] INTERNAL MEDICINE [place]  :  Assessment & Plan  Type 2 diabetes mellitus without complication, with long-term current use of insulin (HCC)  He reports he is doing well with the current regimen.  Continue Zepbound.  Lab Results   Component Value Date    HGBA1C 5.8 (H) 2024       Orders:  •  POCT urine microalbumin/creatinine    Erectile dysfunction due to arterial insufficiency  Reports difficulty with erections ever since starting Zepbound.  Orders:  •  sildenafil (VIAGRA) 50 MG tablet; Take 1 tablet (50 mg total) by mouth daily as needed for erectile dysfunction          Depression Screening and Follow-up Plan:   Patient with underlying depression and was advised to continue current medications as prescribed.     History of Present Illness   Follow up.    Review of Systems   Constitutional:  Negative for chills and fever.   HENT:  Negative for ear pain and sore throat.    Eyes:  Negative for pain and visual disturbance.   Respiratory:  Negative for cough and shortness of breath.    Cardiovascular:  Negative for chest pain and palpitations.   Gastrointestinal:  Negative for abdominal pain and vomiting.   Genitourinary:  Negative for dysuria and hematuria.   Musculoskeletal:  Negative for arthralgias and back pain.   Skin:  Negative for color change and rash.   Neurological:  Negative for seizures and syncope.   All other systems reviewed and are negative.      Objective   /72 (BP Location: Right arm, Patient Position: Sitting, Cuff Size: Standard)   Pulse 64   Ht 5' 10\" (1.778 m)   Wt 125 kg (275 lb 6.4 oz)   SpO2 97%   BMI 39.52 kg/m²      Physical Exam  Vitals and nursing note reviewed.   Constitutional:       General: He is not in acute distress.     Appearance: He is well-developed.   HENT:      Head: Normocephalic and " atraumatic.     Eyes:      Conjunctiva/sclera: Conjunctivae normal.       Cardiovascular:      Rate and Rhythm: Normal rate.      Heart sounds: No murmur heard.  Pulmonary:      Effort: Pulmonary effort is normal. No respiratory distress.   Abdominal:      Tenderness: There is no abdominal tenderness.     Musculoskeletal:         General: No swelling.     Skin:     General: Skin is warm and dry.      Capillary Refill: Capillary refill takes less than 2 seconds.     Neurological:      Mental Status: He is alert.     Psychiatric:         Mood and Affect: Mood normal.

## 2025-06-03 ENCOUNTER — RESULTS FOLLOW-UP (OUTPATIENT)
Dept: INTERNAL MEDICINE CLINIC | Facility: CLINIC | Age: 60
End: 2025-06-03

## 2025-06-03 LAB
ALBUMIN SERPL-MCNC: 4 G/DL (ref 3.9–4.9)
ALP SERPL-CCNC: 87 IU/L (ref 44–121)
ALT SERPL-CCNC: 24 IU/L (ref 0–44)
AST SERPL-CCNC: 15 IU/L (ref 0–40)
BASOPHILS # BLD AUTO: 0 X10E3/UL (ref 0–0.2)
BASOPHILS NFR BLD AUTO: 1 %
BILIRUB SERPL-MCNC: 0.5 MG/DL (ref 0–1.2)
BUN SERPL-MCNC: 13 MG/DL (ref 8–27)
BUN/CREAT SERPL: 15 (ref 10–24)
CALCIUM SERPL-MCNC: 8.9 MG/DL (ref 8.6–10.2)
CHLORIDE SERPL-SCNC: 105 MMOL/L (ref 96–106)
CHOLEST SERPL-MCNC: 96 MG/DL (ref 100–199)
CO2 SERPL-SCNC: 21 MMOL/L (ref 20–29)
CREAT SERPL-MCNC: 0.89 MG/DL (ref 0.76–1.27)
EGFR: 97 ML/MIN/1.73
EOSINOPHIL # BLD AUTO: 0.2 X10E3/UL (ref 0–0.4)
EOSINOPHIL NFR BLD AUTO: 3 %
ERYTHROCYTE [DISTWIDTH] IN BLOOD BY AUTOMATED COUNT: 12.6 % (ref 11.6–15.4)
GLOBULIN SER-MCNC: 2.5 G/DL (ref 1.5–4.5)
GLUCOSE SERPL-MCNC: 90 MG/DL (ref 70–99)
HBA1C MFR BLD: 5.6 % (ref 4.8–5.6)
HCT VFR BLD AUTO: 41.7 % (ref 37.5–51)
HDLC SERPL-MCNC: 47 MG/DL
HGB BLD-MCNC: 13 G/DL (ref 13–17.7)
IMM GRANULOCYTES # BLD: 0 X10E3/UL (ref 0–0.1)
IMM GRANULOCYTES NFR BLD: 0 %
LDL CALC COMMENT: ABNORMAL
LDLC SERPL CALC-MCNC: 38 MG/DL (ref 0–99)
LDLC SERPL DIRECT ASSAY-MCNC: 38 MG/DL (ref 0–99)
LYMPHOCYTES # BLD AUTO: 2.1 X10E3/UL (ref 0.7–3.1)
LYMPHOCYTES NFR BLD AUTO: 37 %
MCH RBC QN AUTO: 29.9 PG (ref 26.6–33)
MCHC RBC AUTO-ENTMCNC: 31.2 G/DL (ref 31.5–35.7)
MCV RBC AUTO: 96 FL (ref 79–97)
MONOCYTES # BLD AUTO: 0.5 X10E3/UL (ref 0.1–0.9)
MONOCYTES NFR BLD AUTO: 9 %
NEUTROPHILS # BLD AUTO: 2.9 X10E3/UL (ref 1.4–7)
NEUTROPHILS NFR BLD AUTO: 50 %
PLATELET # BLD AUTO: 252 X10E3/UL (ref 150–450)
POTASSIUM SERPL-SCNC: 4.1 MMOL/L (ref 3.5–5.2)
PROT SERPL-MCNC: 6.5 G/DL (ref 6–8.5)
RBC # BLD AUTO: 4.35 X10E6/UL (ref 4.14–5.8)
SL AMB VLDL CHOLESTEROL CALC: 11 MG/DL (ref 5–40)
SODIUM SERPL-SCNC: 143 MMOL/L (ref 134–144)
TRIGL SERPL-MCNC: 42 MG/DL (ref 0–149)
WBC # BLD AUTO: 5.7 X10E3/UL (ref 3.4–10.8)

## 2025-06-09 ENCOUNTER — OFFICE VISIT (OUTPATIENT)
Dept: CARDIOLOGY CLINIC | Facility: CLINIC | Age: 60
End: 2025-06-09
Payer: COMMERCIAL

## 2025-06-09 VITALS
HEIGHT: 70 IN | WEIGHT: 271 LBS | HEART RATE: 53 BPM | BODY MASS INDEX: 38.8 KG/M2 | DIASTOLIC BLOOD PRESSURE: 80 MMHG | SYSTOLIC BLOOD PRESSURE: 126 MMHG

## 2025-06-09 DIAGNOSIS — R07.9 CHEST PAIN, UNSPECIFIED TYPE: Primary | ICD-10-CM

## 2025-06-09 DIAGNOSIS — I49.3 PVC'S (PREMATURE VENTRICULAR CONTRACTIONS): ICD-10-CM

## 2025-06-09 PROCEDURE — 99204 OFFICE O/P NEW MOD 45 MIN: CPT | Performed by: INTERNAL MEDICINE

## 2025-06-09 NOTE — PROGRESS NOTES
Cardiology             Guille LOJA Newhall  1965  [id number]              Assessment/Plan:    Noncardiac chest pain  Frequent PVCs reported during exercise stress test  Hypertension  Dyslipidemia      Low suspicion of anginal chest pain as he describes a pulling sensation starting in his upper back that radiates over the shoulder and into his chest lasting 30 seconds with no associated symptoms, always occurring at rest and not with physical exertion.  He tolerates walking and exercise well without exertional symptoms.  Patient reassured.  Recent treadmill exercise stress test without ischemic ECG abnormalities.  Frequent PVCs reported during exercise stress test.  He would like to follow-up with electrophysiology for further evaluation.  I reviewed the stress test myself and PVCs seem occasional with exercise but not persistent or frequent/high burden.  There was a 4 beat run of NSVT although there may be a component of artifact.  Continue lisinopril for hypertension, blood pressure control  Continue atorvastatin for dyslipidemia, LDL cholesterol reviewed from 6-25, well-controlled at 38 mg/dL      Follow-up with EP        Interval History:     This is a 60-year-old male with hypertension who went to the ER on 1/29/2025 for chest pain that he had been experiencing for 1 month radiating to his back and shoulder.  His blood pressure in the ER was 171/99 with heart rate 50 bpm.  Over time his blood pressure improved down to 139/70.  High sensitive troponin was negative with normal BNP at 51.  2-hour troponin was 10.  CTA chest, abdomen, pelvis revealed a fat-containing umbilical hernia and otherwise unremarkable with normal chest x-ray.  ECG reveals sinus bradycardia with possible inferior infarction, age undetermined.  Subsequent treadmill Exa stress test on 1/30/2025 noted no evidence of ischemia.  There were frequent PVCs reported during exercise including a 4 beat run of NSVT.    He presents today for  "initial evaluation.  He states his chest pain occurs perhaps once a day lasting about 30 seconds or so and feels a pulling sensation that starts in his upper back and radiates over his shoulder and into his chest.  He has no associated symptoms.  This could occur anytime of the day even when he is simply sitting.  It is typically not exertional.  He denies any associated symptoms.               Vitals:  Vitals:    06/09/25 0849   BP: 126/80   Pulse: (!) 53   Weight: 123 kg (271 lb)   Height: 5' 10\" (1.778 m)         Past Medical History[1]  Social History     Socioeconomic History   • Marital status: /Civil Union     Spouse name: Not on file   • Number of children: Not on file   • Years of education: Not on file   • Highest education level: Not on file   Occupational History   • Not on file   Tobacco Use   • Smoking status: Former     Current packs/day: 0.00     Average packs/day: 0.3 packs/day for 10.0 years (2.5 ttl pk-yrs)     Types: Cigarettes     Start date: 4/11/2000     Quit date: 4/11/2010     Years since quitting: 15.1     Passive exposure: Never   • Smokeless tobacco: Never   • Tobacco comments:     occasionally every couple days   Vaping Use   • Vaping status: Never Used   Substance and Sexual Activity   • Alcohol use: Yes     Alcohol/week: 6.0 standard drinks of alcohol     Types: 2 Cans of beer, 4 Shots of liquor per week     Comment: Social   • Drug use: No   • Sexual activity: Not Currently     Partners: Female   Other Topics Concern   • Not on file   Social History Narrative   • Not on file     Social Drivers of Health     Financial Resource Strain: Not on file   Food Insecurity: Not on file   Transportation Needs: Not on file   Physical Activity: Not on file   Stress: Not on file   Social Connections: Not on file   Intimate Partner Violence: Not on file   Housing Stability: Not on file      Family History[2]  Past Surgical History[3]  Current Medications[4]        Review of Systems:  Review " of Systems   Respiratory:  Positive for chest tightness.    Cardiovascular: Negative.    All other systems reviewed and are negative.        Physical Exam:  Physical Exam  Constitutional:       General: He is not in acute distress.     Appearance: He is well-developed. He is not diaphoretic.   HENT:      Head: Normocephalic and atraumatic.     Eyes:      General: No scleral icterus.        Right eye: No discharge.      Pupils: Pupils are equal, round, and reactive to light.     Neck:      Thyroid: No thyromegaly.     Cardiovascular:      Rate and Rhythm: Normal rate and regular rhythm.      Heart sounds: Normal heart sounds. No murmur heard.     No friction rub. No gallop.   Pulmonary:      Effort: Pulmonary effort is normal.      Breath sounds: Normal breath sounds.   Abdominal:      General: There is no distension.      Tenderness: There is no abdominal tenderness. There is no guarding or rebound.     Musculoskeletal:         General: Normal range of motion.      Cervical back: Normal range of motion and neck supple.     Skin:     General: Skin is warm and dry.      Coloration: Skin is not pale.      Findings: No erythema or rash.     Neurological:      Mental Status: He is alert and oriented to person, place, and time.      Coordination: Coordination normal.     Psychiatric:         Behavior: Behavior normal.         Thought Content: Thought content normal.         Judgment: Judgment normal.         This note was completed in part utilizing M-Modal Fluency Direct Software.  Grammatical errors, random word insertions, spelling mistakes, and incomplete sentences can be an occasional consequence of this system secondary to software limitations, ambient noise, and hardware issues.  If you have any questions or concerns about the content, text, or information contained within the body of this dictation, please contact the provider for clarification.             [1]  Past Medical History:  Diagnosis Date   • Allergic      [place] Family   • Arthritis     Last assessed: 5/21/12   • Diabetes mellitus (HCC) 2/20/07    Off and on for 15Years   • History of bariatric surgery    • Hypertension 2/14/22   • Morbid obesity due to excess calories (HCC)     Last assessed: 5/21/12   • Obesity    • Postsurgical malabsorption    • Type 2 diabetes mellitus (HCC)     Last assessed: 6/5/15   • Vitamin B1 deficiency     Last assessed: 9/29/14   • Vitamin D deficiency     Last assessed: 9/29/14   [2]  Family History  Problem Relation Name Age of Onset   • Heart failure Mother          Congestive   • Hypertension Mother     • Heart disease Mother     • Bone cancer Father     • Throat cancer Father          Laryngeal   • Diabetes Neg Hx     • Thyroid disease Neg Hx     • Stroke Neg Hx     [3]  Past Surgical History:  Procedure Laterality Date   • GASTRIC BYPASS      For Morbid Obesity. Managed by: José Miguel Deleon. Last assessed: 6/16/14   • HERNIA REPAIR     [4]    Current Outpatient Medications:   •  atorvastatin (LIPITOR) 40 mg tablet, Take 1 tablet (40 mg total) by mouth daily, Disp: 30 tablet, Rfl: 5  •  CALCIUM CITRATE PO, Take by mouth, Disp: , Rfl:   •  Cholecalciferol (Vitamin D3) 50 MCG (2000 UT) CAPS, Take 1 capsule (2,000 Units total) by mouth daily, Disp: 30 capsule, Rfl: 2  •  clobetasol (TEMOVATE) 0.05 % ointment, Apply topically 2 (two) times a day, Disp: 60 g, Rfl: 1  •  Cyanocobalamin (VITAMIN B-12) 5000 MCG SUBL, Place under the tongue, Disp: , Rfl:   •  EPINEPHrine (EPIPEN) 0.3 mg/0.3 mL SOAJ, Inject 0.3 mL (0.3 mg total) into a muscle once for 1 dose, Disp: 0.6 mL, Rfl: 3  •  lisinopril (ZESTRIL) 20 mg tablet, Take 1 tablet (20 mg total) by mouth daily, Disp: 30 tablet, Rfl: 5  •  Multiple Vitamin (MULTI-VITAMIN DAILY) TABS, Take by mouth, Disp: , Rfl:   •  sildenafil (VIAGRA) 50 MG tablet, Take 1 tablet (50 mg total) by mouth daily as needed for erectile dysfunction, Disp: 10 tablet, Rfl: 0  •  tirzepatide (Zepbound) 7.5 mg/0.5 mL  auto-injector, Inject 0.5 mL (7.5 mg total) under the skin once a week, Disp: 2 mL, Rfl: 0    Current Facility-Administered Medications:   •  cyanocobalamin injection 1,000 mcg, 1,000 mcg, Intramuscular, Q30 Days, NEAL [place]is, 1,000 mcg at 04/25/19 1109

## 2025-06-13 ENCOUNTER — OFFICE VISIT (OUTPATIENT)
Dept: CARDIOLOGY CLINIC | Facility: CLINIC | Age: 60
End: 2025-06-13
Attending: INTERNAL MEDICINE
Payer: COMMERCIAL

## 2025-06-13 VITALS
HEIGHT: 70 IN | RESPIRATION RATE: 16 BRPM | DIASTOLIC BLOOD PRESSURE: 80 MMHG | OXYGEN SATURATION: 97 % | SYSTOLIC BLOOD PRESSURE: 122 MMHG | HEART RATE: 62 BPM | BODY MASS INDEX: 38.8 KG/M2 | WEIGHT: 271 LBS

## 2025-06-13 DIAGNOSIS — E66.813 OBESITY, CLASS III, BMI 40-49.9 (MORBID OBESITY): ICD-10-CM

## 2025-06-13 DIAGNOSIS — I10 PRIMARY HYPERTENSION: ICD-10-CM

## 2025-06-13 DIAGNOSIS — E78.2 MIXED HYPERLIPIDEMIA: ICD-10-CM

## 2025-06-13 DIAGNOSIS — E11.21 CONTROLLED TYPE 2 DIABETES MELLITUS WITH DIABETIC NEPHROPATHY, WITHOUT LONG-TERM CURRENT USE OF INSULIN (HCC): ICD-10-CM

## 2025-06-13 DIAGNOSIS — R07.9 CHEST PAIN, UNSPECIFIED TYPE: Primary | ICD-10-CM

## 2025-06-13 DIAGNOSIS — I49.3 PVC'S (PREMATURE VENTRICULAR CONTRACTIONS): ICD-10-CM

## 2025-06-13 PROCEDURE — 93000 ELECTROCARDIOGRAM COMPLETE: CPT | Performed by: INTERNAL MEDICINE

## 2025-06-13 PROCEDURE — 99204 OFFICE O/P NEW MOD 45 MIN: CPT | Performed by: INTERNAL MEDICINE

## 2025-06-13 RX ORDER — ASPIRIN 81 MG/1
81 TABLET, CHEWABLE ORAL DAILY
COMMUNITY

## 2025-06-13 NOTE — PROGRESS NOTES
EPS Consultation/New Patient Evaluation - Guille Macias 60 y.o. male MRN: [id number]       Referring:Dr. Pino    Assessment & Plan  Chest pain, unspecified type  Atypical chest pain, originating from back to front on the left side  Patient had exercise stress test which showed no ischemia  CTA has ruled out dissection  Likely cause is MSK vs. neuropathic  PVC's (premature ventricular contractions)  Had different morphology PVCs during stress test  Also had couplets and triplets with different morphology   Will obtain 2 week monitor and ECHO  Then will get [facility] to rule out CAD as PVCs are concerning to ischemia at stress  Controlled type 2 diabetes mellitus with diabetic nephropathy, without long-term current use of insulin ([facility])    Lab Results   Component Value Date    HGBA1C 5.6 06/02/2025   On Zepbound  Obesity, Class III, BMI 40-49.9 (morbid obesity)  S/p gastrectomy  Has lost weight with diet and meds  Feels well  Mixed hyperlipidemia  Maintained on simvastatin   Primary hypertension  Normotensive in the office  Maintained on lisinopril    CC/HPI:   It was a pleasure to see Guille Macias in our arrhythmia clinic at [facility]. As you know he is a 60 y.o. man with noncardiac chest pain, hypertension, hyperlipidemia, frequent PVC who presents to discuss management of PVC.    Patient was noted to have frequent PVCs during exercise stress test which was done for atypical chest pain.  Stress test did not show any ischemic changes.  PVCs seemed occasional with exercise.  He is on lisinopril for his high blood pressure and atorvastatin for hyperlipidemia.    He does get chest pain that radiates from back shoulder blade to front on the left side. He has lost significant weight through diet and medication. He works in [employer].     Patient denies significant alcohol or caffeine.     Past Medical History:  Past Medical History[1]    Medications:    Current Medications[2]      Family History[3]  Social History     Socioeconomic History    Marital status: /Civil Union     Spouse name: Not on file    Number of children: Not on file    Years of education: Not on file    Highest education level: Not on file   Occupational History    Not on file   Tobacco Use    Smoking status: Former     Current packs/day: 0.00     Average packs/day: 0.3 packs/day for 10.0 years (2.5 ttl pk-yrs)     Types: Cigarettes     Start date: 4/11/2000     Quit date: 4/11/2010     Years since quitting: 15.1     Passive exposure: Never    Smokeless tobacco: Never    Tobacco comments:     occasionally every couple days   Vaping Use    Vaping status: Never Used   Substance and Sexual Activity    Alcohol use: Yes     Alcohol/week: 6.0 standard drinks of alcohol     Types: 2 Cans of beer, 4 Shots of liquor per week     Comment: Social    Drug use: No    Sexual activity: Not Currently     Partners: Female   Other Topics Concern    Not on file   Social History Narrative    Not on file     Social Drivers of Health     Financial Resource Strain: Not on file   Food Insecurity: Not on file   Transportation Needs: Not on file   Physical Activity: Not on file   Stress: Not on file   Social Connections: Not on file   Intimate Partner Violence: Not on file   Housing Stability: Not on file     Tobacco Use History[4]  Social History     Substance and Sexual Activity   Alcohol Use Yes    Alcohol/week: 6.0 standard drinks of alcohol    Types: 2 Cans of beer, 4 Shots of liquor per week    Comment: Social       Review of Systems   Constitutional: Negative for chills and fever.   HENT: Negative.     Eyes:  Negative for blurred vision and double vision.   Cardiovascular:  Negative for chest pain, dyspnea on exertion, leg swelling, near-syncope, orthopnea, palpitations, paroxysmal nocturnal dyspnea and syncope.   Respiratory:  Negative for cough and sputum production.    Endocrine: Negative.    Skin: Negative.  Negative for rash.  "  Musculoskeletal: Negative.  Negative for arthritis and joint pain.   Gastrointestinal:  Negative for abdominal pain, nausea and vomiting.   Genitourinary: Negative.    Neurological: Negative.  Negative for dizziness and light-headedness.   Psychiatric/Behavioral: Negative.  The patient is not nervous/anxious.         Objective:     Vitals: Blood pressure 122/80, pulse 62, resp. rate 16, height 5' 10\" (1.778 m), weight 123 kg (271 lb), SpO2 97%., Body mass index is 38.88 kg/m².,        Physical Exam:    GEN: Guille M Oakes appears well, alert and oriented x 3, pleasant and cooperative; morbidly obese  HEENT: pupils equal, round, and reactive to light; extraocular muscles intact  NECK: supple, no carotid bruits   HEART: regular rhythm, normal S1 and S2, no murmurs, clicks, gallops or rubs   LUNGS: clear to auscultation bilaterally; no wheezes, rales, or rhonchi   ABDOMEN: normal bowel sounds, soft, no tenderness, no distention  EXTREMITIES: peripheral pulses normal; no clubbing, cyanosis, or edema  NEURO: no focal findings   SKIN: normal without suspicious lesions on exposed skin      Labs & Results:  Below is the patient's most recent value for Albumin, ALT, AST, BUN, Calcium, Chloride, Cholesterol, CO2, Creatinine, GFR, Glucose, HDL, Hematocrit, Hemoglobin, Hemoglobin A1C, LDL, Magnesium, Phosphorus, Platelets, Potassium, PSA, Sodium, Triglycerides, and WBC.   Lab Results   Component Value Date    ALT 24 06/02/2025    AST 15 06/02/2025    BUN 13 06/02/2025    CALCIUM 8.0 (L) 01/29/2025     06/02/2025    CHOL 155 09/26/2017    CO2 21 06/02/2025    CREATININE 0.89 06/02/2025    HDL 47 06/02/2025    HCT 41.7 06/02/2025    HGB 13.0 06/02/2025    HGBA1C 5.6 06/02/2025    MG 1.9 06/19/2020     06/02/2025    K 4.1 06/02/2025    PSA 0.4 12/03/2024     09/26/2017    TRIG 42 06/02/2025    WBC 5.7 06/02/2025     Note: for a comprehensive list of the patient's lab results, access the Results Review " activity.          Cardiac testing:     I personally reviewed the ECG performed in the clinic on 06/13/25. It reveals sinus rhythm.     Echocardiograms:  No results found for this or any previous visit.    No results found for this or any previous visit.      Catheterizations:   No results found for this or any previous visit.      Stress Tests:  No results found for this or any previous visit.      Holter monitor -   No results found for this or any previous visit.    No results found for this or any previous visit.           [1]   Past Medical History:  Diagnosis Date    Allergic     [place] Family    Arthritis     Last assessed: 5/21/12    Diabetes mellitus (HCC) 2/20/07    Off and on for 15Years    History of bariatric surgery     Hypertension 2/14/22    Morbid obesity due to excess calories (HCC)     Last assessed: 5/21/12    Obesity     Postsurgical malabsorption     Type 2 diabetes mellitus (HCC)     Last assessed: 6/5/15    Vitamin B1 deficiency     Last assessed: 9/29/14    Vitamin D deficiency     Last assessed: 9/29/14   [2]   Current Outpatient Medications:     aspirin 81 mg chewable tablet, Chew 81 mg daily, Disp: , Rfl:     atorvastatin (LIPITOR) 40 mg tablet, Take 1 tablet (40 mg total) by mouth daily, Disp: 30 tablet, Rfl: 5    CALCIUM CITRATE PO, Take by mouth, Disp: , Rfl:     Cholecalciferol (Vitamin D3) 50 MCG (2000 UT) CAPS, Take 1 capsule (2,000 Units total) by mouth daily, Disp: 30 capsule, Rfl: 2    clobetasol (TEMOVATE) 0.05 % ointment, Apply topically 2 (two) times a day, Disp: 60 g, Rfl: 1    Cyanocobalamin (VITAMIN B-12) 5000 MCG SUBL, Place under the tongue, Disp: , Rfl:     lisinopril (ZESTRIL) 20 mg tablet, Take 1 tablet (20 mg total) by mouth daily, Disp: 30 tablet, Rfl: 5    Multiple Vitamin (MULTI-VITAMIN DAILY) TABS, Take by mouth, Disp: , Rfl:     sildenafil (VIAGRA) 50 MG tablet, Take 1 tablet (50 mg total) by mouth daily as needed for erectile dysfunction, Disp: 10 tablet, Rfl:  0    tirzepatide (Zepbound) 7.5 mg/0.5 mL auto-injector, Inject 0.5 mL (7.5 mg total) under the skin once a week, Disp: 2 mL, Rfl: 0    EPINEPHrine (EPIPEN) 0.3 mg/0.3 mL SOAJ, Inject 0.3 mL (0.3 mg total) into a muscle once for 1 dose, Disp: 0.6 mL, Rfl: 3    Current Facility-Administered Medications:     cyanocobalamin injection 1,000 mcg, 1,000 mcg, Intramuscular, Q30 Days, NEAL [place], 1,000 mcg at 04/25/19 1106  [3]   Family History  Problem Relation Name Age of Onset    Heart failure Mother          Congestive    Hypertension Mother      Heart disease Mother      Bone cancer Father      Throat cancer Father          Laryngeal    Diabetes Neg Hx      Thyroid disease Neg Hx      Stroke Neg Hx     [4]   Social History  Tobacco Use   Smoking Status Former    Current packs/day: 0.00    Average packs/day: 0.3 packs/day for 10.0 years (2.5 ttl pk-yrs)    Types: Cigarettes    Start date: 4/11/2000    Quit date: 4/11/2010    Years since quitting: 15.1    Passive exposure: Never   Smokeless Tobacco Never   Tobacco Comments    occasionally every couple days

## 2025-06-16 ENCOUNTER — TELEPHONE (OUTPATIENT)
Age: 60
End: 2025-06-16

## 2025-06-16 NOTE — TELEPHONE ENCOUNTER
Intravitreal Injections: Received call from pt stating he saw Dr. Santillan in the [place] office on 6/13 where he was given a return to work note. He stated however he needs the letter to be stamped and wanted to stop by the office today. Called the [place] office and s/w Yvonne who stated they are in the office today however they do not provide stamps. Informed pt who stated he will explain what he needs further in person at the office.

## 2025-06-21 DIAGNOSIS — E66.01 MORBID OBESITY WITH BMI OF 40.0-44.9, ADULT (HCC): ICD-10-CM

## 2025-06-21 DIAGNOSIS — I10 PRIMARY HYPERTENSION: ICD-10-CM

## 2025-06-21 DIAGNOSIS — E55.9 VITAMIN D DEFICIENCY: ICD-10-CM

## 2025-06-21 DIAGNOSIS — E11.21 CONTROLLED TYPE 2 DIABETES MELLITUS WITH DIABETIC NEPHROPATHY, WITHOUT LONG-TERM CURRENT USE OF INSULIN (HCC): ICD-10-CM

## 2025-06-21 DIAGNOSIS — N52.01 ERECTILE DYSFUNCTION DUE TO ARTERIAL INSUFFICIENCY: ICD-10-CM

## 2025-06-22 RX ORDER — LISINOPRIL 20 MG/1
20 TABLET ORAL DAILY
Qty: 30 TABLET | Refills: 5 | Status: SHIPPED | OUTPATIENT
Start: 2025-06-22

## 2025-06-22 RX ORDER — SILDENAFIL 50 MG/1
50 TABLET, FILM COATED ORAL DAILY PRN
Qty: 10 TABLET | Refills: 2 | Status: SHIPPED | OUTPATIENT
Start: 2025-06-22

## 2025-06-22 RX ORDER — ACETAMINOPHEN 160 MG
2000 TABLET,DISINTEGRATING ORAL DAILY
Qty: 30 CAPSULE | Refills: 5 | Status: SHIPPED | OUTPATIENT
Start: 2025-06-22

## 2025-06-22 RX ORDER — TIRZEPATIDE 7.5 MG/.5ML
7.5 INJECTION, SOLUTION SUBCUTANEOUS WEEKLY
Qty: 2 ML | Refills: 0 | Status: SHIPPED | OUTPATIENT
Start: 2025-06-22

## 2025-06-30 ENCOUNTER — HOSPITAL ENCOUNTER (OUTPATIENT)
Dept: NON INVASIVE DIAGNOSTICS | Facility: CLINIC | Age: 60
Discharge: HOME | End: 2025-06-30
Attending: INTERNAL MEDICINE
Payer: COMMERCIAL

## 2025-06-30 ENCOUNTER — RESULTS FOLLOW-UP (OUTPATIENT)
Dept: CARDIOLOGY CLINIC | Facility: CLINIC | Age: 60
End: 2025-06-30

## 2025-06-30 VITALS
HEART RATE: 58 BPM | DIASTOLIC BLOOD PRESSURE: 80 MMHG | WEIGHT: 271.17 LBS | HEIGHT: 70 IN | BODY MASS INDEX: 38.82 KG/M2 | SYSTOLIC BLOOD PRESSURE: 122 MMHG

## 2025-06-30 DIAGNOSIS — I49.3 PVC'S (PREMATURE VENTRICULAR CONTRACTIONS): ICD-10-CM

## 2025-06-30 DIAGNOSIS — R07.9 CHEST PAIN, UNSPECIFIED TYPE: ICD-10-CM

## 2025-06-30 LAB
AORTIC ROOT: 3.8 CM
BSA FOR ECHO PROCEDURE: 2.38 M2
E WAVE DECELERATION TIME: 207 MS
E/A RATIO: 0.85
FRACTIONAL SHORTENING: 27 (ref 28–44)
INTERVENTRICULAR SEPTUM IN DIASTOLE (PARASTERNAL SHORT AXIS VIEW): 1.2 CM
INTERVENTRICULAR SEPTUM: 1.2 CM (ref 0.6–1.1)
LAAS-AP2: 20.8 CM2
LAAS-AP4: 20.4 CM2
LEFT ATRIUM AREA SYSTOLE SINGLE PLANE A4C: 20.3 CM2
LEFT ATRIUM SIZE: 4.8 CM
LEFT ATRIUM VOLUME (MOD BIPLANE): 55 ML
LEFT ATRIUM VOLUME INDEX (MOD BIPLANE): 23.2 ML/M2
LEFT INTERNAL DIMENSION IN SYSTOLE: 4 CM (ref 2.1–4)
LEFT VENTRICULAR INTERNAL DIMENSION IN DIASTOLE: 5.5 CM (ref 3.5–6)
LEFT VENTRICULAR POSTERIOR WALL IN END DIASTOLE: 1.2 CM
LEFT VENTRICULAR STROKE VOLUME: 79 ML
LV EF US.2D.A4C+ESTIMATED: 56 %
LVSV (TEICH): 79 ML
MV E'TISSUE VEL-LAT: 12 CM/S
MV E'TISSUE VEL-SEP: 10 CM/S
MV PEAK A VEL: 0.8 M/S
MV PEAK E VEL: 68 CM/S
MV STENOSIS PRESSURE HALF TIME: 60 MS
MV VALVE AREA P 1/2 METHOD: 3.67
RIGHT ATRIUM AREA SYSTOLE A4C: 15.8 CM2
RIGHT VENTRICLE ID DIMENSION: 3.7 CM
SL CV LEFT ATRIUM LENGTH A2C: 6.4 CM
SL CV LV EF: 60
SL CV PED ECHO LEFT VENTRICLE DIASTOLIC VOLUME (MOD BIPLANE) 2D: 149 ML
SL CV PED ECHO LEFT VENTRICLE SYSTOLIC VOLUME (MOD BIPLANE) 2D: 70 ML
TR MAX PG: 11 MMHG
TR PEAK VELOCITY: 1.7 M/S
TRICUSPID ANNULAR PLANE SYSTOLIC EXCURSION: 2 CM
TRICUSPID VALVE PEAK REGURGITATION VELOCITY: 1.65 M/S

## 2025-06-30 PROCEDURE — 93306 TTE W/DOPPLER COMPLETE: CPT | Performed by: STUDENT IN AN ORGANIZED HEALTH CARE EDUCATION/TRAINING PROGRAM

## 2025-06-30 PROCEDURE — 93306 TTE W/DOPPLER COMPLETE: CPT

## 2025-07-01 ENCOUNTER — PREP FOR PROCEDURE (OUTPATIENT)
Dept: CARDIOLOGY CLINIC | Facility: CLINIC | Age: 60
End: 2025-07-01

## 2025-07-01 DIAGNOSIS — I47.29 NSVT (NONSUSTAINED VENTRICULAR TACHYCARDIA) (HCC): ICD-10-CM

## 2025-07-01 DIAGNOSIS — I49.3 PVC (PREMATURE VENTRICULAR CONTRACTION): ICD-10-CM

## 2025-07-01 DIAGNOSIS — R94.39 ABNORMAL STRESS TEST: ICD-10-CM

## 2025-07-01 DIAGNOSIS — I49.8 OTHER SPECIFIED CARDIAC ARRHYTHMIAS: Primary | ICD-10-CM

## 2025-07-01 NOTE — TELEPHONE ENCOUNTER
"Called patient and informed of 's message and to schedule the Left Heart Cath.     Patient wants to schedule LHC at [facility].     Informed patient I will send message to Sara Alvarado to get him schedule.     Sara: Please call and schedule LHC. Prep for Procedure in [facility].    Thanks,  Destini \"Edelmira\" Roberto     "

## 2025-07-01 NOTE — TELEPHONE ENCOUNTER
----- Message from Jomar Santillan MD sent at 6/30/2025 10:37 PM EDT -----  Please call the patient about normal ECHO results.     Please let him know I would like to get heart catheterization.  He can get it done locally. Scheduling team can assist in setting it up. Indication - arrhythmias, NSVT, PVC, abnormal stress test    Thank you.   ----- Message -----  From: Sarabjit Cast MD  Sent: 6/30/2025  12:52 PM EDT  To: Jomar Santillan MD

## 2025-07-01 NOTE — TELEPHONE ENCOUNTER
----- Message from Destini Mejia sent at 7/1/2025  8:28 AM EDT -----      ----- Message -----  From: Jomar Santillan MD  Sent: 6/30/2025  10:37 PM EDT  To: Cardiology Surgery Coordinator; Cardiology E#    Please call the patient about normal ECHO results.     Please let him know I would like to get heart catheterization.  He can get it done locally. Scheduling team can assist in setting it up. Indication - arrhythmias, NSVT, PVC, abnormal stress test    Thank you.   ----- Message -----  From: Sarabjit Cast MD  Sent: 6/30/2025  12:52 PM EDT  To: Jomar Santillan MD

## 2025-07-01 NOTE — TELEPHONE ENCOUNTER
S/w pt. Advised of card cath scheduled for 7/14. Pt advised to hold Zepbound 7 days prior to, and lisinopril morning of procedure. Pt advised to obtain labs ordered ASAP. Message sent to pt via [employer] with instructions provided over the phone. Pt verbalized understanding

## 2025-07-02 NOTE — TELEPHONE ENCOUNTER
----- Message from Jomar Santillan MD sent at 7/1/2025  5:58 PM EDT -----  Thx  ----- Message -----  From: Destini Mejia  Sent: 7/1/2025   8:28 AM EDT  To: Jomar Santillan MD; Sara Alvarado MA    ----- Message from Destini Mejia sent at 7/1/2025  8:28 AM EDT -----      ----- Message -----  From: Jomar Santillan MD  Sent: 6/30/2025  10:37 PM EDT  To: Cardiology Surgery Coordinator; Cardiology E#    Please call the patient about normal ECHO results.     Please let him know I would like to get heart catheterization.  He can get it done locally. Scheduling team can assist in setting it up. Indication - arrhythmias, NSVT, PVC, abnormal stress test    Thank you.   ----- Message -----  From: Sarabjit Cast MD  Sent: 6/30/2025  12:52 PM EDT  To: Jomar Santillan MD

## 2025-07-07 ENCOUNTER — TELEPHONE (OUTPATIENT)
Dept: OTHER | Facility: OTHER | Age: 60
End: 2025-07-07

## 2025-07-07 ENCOUNTER — TELEPHONE (OUTPATIENT)
Age: 60
End: 2025-07-07

## 2025-07-07 DIAGNOSIS — I10 PRIMARY HYPERTENSION: Primary | ICD-10-CM

## 2025-07-07 NOTE — TELEPHONE ENCOUNTER
Patient called stating he was at [facility] this morning to have labs drawn but they did not have any orders from Cardiology. Patient was advised no lab orders were seen in his chart and he would need to contact Cardiology.  A soft transfer was completed to connect patient to cardiology.

## 2025-07-07 NOTE — TELEPHONE ENCOUNTER
PATIENT HAVE A CATH SCHEDULE FOR 7/14/2025       PATIENT LAB ARE UPLOADED       CALLED PATIENT MADE HIM AWARE TO GET LAB DONE

## 2025-07-07 NOTE — TELEPHONE ENCOUNTER
Patient is calling regarding cancelling an appointment.    Date/Time: 7/7/2025 / 8:30 am     Patient was rescheduled: YES [] NO [x]    Patient requesting call back to reschedule: YES [x] NO []

## 2025-07-07 NOTE — TELEPHONE ENCOUNTER
Patient would like a call back. Would like to know if blood work will need to be completed prior to his appointment.

## 2025-07-08 ENCOUNTER — RESULTS FOLLOW-UP (OUTPATIENT)
Dept: CARDIOLOGY CLINIC | Facility: CLINIC | Age: 60
End: 2025-07-08

## 2025-07-08 LAB
BASOPHILS # BLD AUTO: 0 X10E3/UL (ref 0–0.2)
BASOPHILS NFR BLD AUTO: 1 %
BUN SERPL-MCNC: 12 MG/DL (ref 8–27)
BUN/CREAT SERPL: 16 (ref 10–24)
CALCIUM SERPL-MCNC: 9 MG/DL (ref 8.6–10.2)
CHLORIDE SERPL-SCNC: 107 MMOL/L (ref 96–106)
CO2 SERPL-SCNC: 21 MMOL/L (ref 20–29)
CREAT SERPL-MCNC: 0.77 MG/DL (ref 0.76–1.27)
EGFR: 101 ML/MIN/1.73
EOSINOPHIL # BLD AUTO: 0.2 X10E3/UL (ref 0–0.4)
EOSINOPHIL NFR BLD AUTO: 4 %
ERYTHROCYTE [DISTWIDTH] IN BLOOD BY AUTOMATED COUNT: 13.3 % (ref 11.6–15.4)
GLUCOSE SERPL-MCNC: 84 MG/DL (ref 70–99)
HCT VFR BLD AUTO: 41.8 % (ref 37.5–51)
HGB BLD-MCNC: 12.9 G/DL (ref 13–17.7)
IMM GRANULOCYTES # BLD: 0 X10E3/UL (ref 0–0.1)
IMM GRANULOCYTES NFR BLD: 0 %
INR PPP: 1 (ref 0.9–1.2)
LYMPHOCYTES # BLD AUTO: 1.9 X10E3/UL (ref 0.7–3.1)
LYMPHOCYTES NFR BLD AUTO: 35 %
MCH RBC QN AUTO: 30.4 PG (ref 26.6–33)
MCHC RBC AUTO-ENTMCNC: 30.9 G/DL (ref 31.5–35.7)
MCV RBC AUTO: 98 FL (ref 79–97)
MONOCYTES # BLD AUTO: 0.5 X10E3/UL (ref 0.1–0.9)
MONOCYTES NFR BLD AUTO: 9 %
NEUTROPHILS # BLD AUTO: 2.8 X10E3/UL (ref 1.4–7)
NEUTROPHILS NFR BLD AUTO: 51 %
PLATELET # BLD AUTO: 238 X10E3/UL (ref 150–450)
POTASSIUM SERPL-SCNC: 4.2 MMOL/L (ref 3.5–5.2)
PROTHROMBIN TIME: 10.9 SEC (ref 9.1–12)
RBC # BLD AUTO: 4.25 X10E6/UL (ref 4.14–5.8)
SODIUM SERPL-SCNC: 142 MMOL/L (ref 134–144)
WBC # BLD AUTO: 5.5 X10E3/UL (ref 3.4–10.8)

## 2025-07-09 NOTE — TELEPHONE ENCOUNTER
----- Message from Jomar Santillan MD sent at 7/8/2025  6:12 PM EDT -----  Please call the patient about normal lab results.     Thank you.   ----- Message -----  From: Olesya Delgado [facility] Lab Results In  Sent: 7/8/2025   7:35 AM EDT  To: Jomar Santillan MD

## 2025-07-10 LAB
CV ZIO BASELINE AVG BPM: 68 BPM
CV ZIO BASELINE BPM HIGH: 207 BPM
CV ZIO BASELINE BPM LOW: 44 BPM
CV ZIO DEVICE ANALYSIS TIME: NORMAL
CV ZIO ECT SVE COUNT: 5739 EPISODES
CV ZIO ECT SVE CPLT COUNT: 86 EPISODES
CV ZIO ECT SVE CPLT FREQ: NORMAL
CV ZIO ECT SVE FREQ: NORMAL
CV ZIO ECT SVE TPLT COUNT: 21 EPISODES
CV ZIO ECT SVE TPLT FREQ: NORMAL
CV ZIO ECT VE COUNT: 3928 EPISODES
CV ZIO ECT VE CPLT COUNT: 58 EPISODES
CV ZIO ECT VE CPLT FREQ: NORMAL
CV ZIO ECT VE FREQ: NORMAL
CV ZIO ECT VE TPLT COUNT: 0 EPISODES
CV ZIO ECT VE TPLT FREQ: 0
CV ZIO ECTOPIC SVE COUPLET RAW PERCENT: 0.01 %
CV ZIO ECTOPIC SVE ISOLATED PERCENT: 0.42 %
CV ZIO ECTOPIC SVE TRIPLET RAW PERCENT: 0 %
CV ZIO ECTOPIC VE COUPLET RAW PERCENT: 0.01 %
CV ZIO ECTOPIC VE ISOLATED PERCENT: 0.29 %
CV ZIO ECTOPIC VE TRIPLET RAW PERCENT: 0 %
CV ZIO ENROLLMENT END: NORMAL
CV ZIO ENROLLMENT START: NORMAL
CV ZIO L BIGEMINY DUR: 4.7 SEC
CV ZIO L BIGEMINY END: NORMAL
CV ZIO L BIGEMINY START: NORMAL
CV ZIO PATIENT EVENTS DIARIES: 3
CV ZIO PATIENT EVENTS TRIGGERS: 18
CV ZIO PAUSE COUNT: 0
CV ZIO PRESCRIPTION STATUS: NORMAL
CV ZIO SVT AVG BPM: 110 BPM
CV ZIO SVT BPM HIGH: 150 BPM
CV ZIO SVT BPM LOW: 75 BPM
CV ZIO SVT COUNT: 18
CV ZIO SVT F EPI AVG BPM: 142 BPM
CV ZIO SVT F EPI BEATS: 5 BEATS
CV ZIO SVT F EPI BPM HIGH: 150 BPM
CV ZIO SVT F EPI BPM LOW: 129 BPM
CV ZIO SVT F EPI DUR: 2.5 SEC
CV ZIO SVT F EPI END: NORMAL
CV ZIO SVT F EPI START: NORMAL
CV ZIO SVT L EPI AVG BPM: 108 BPM
CV ZIO SVT L EPI BEATS: 25 BEATS
CV ZIO SVT L EPI BPM HIGH: 126 BPM
CV ZIO SVT L EPI BPM LOW: 94 BPM
CV ZIO SVT L EPI DUR: 13.7 SEC
CV ZIO SVT L EPI END: NORMAL
CV ZIO SVT L EPI START: NORMAL
CV ZIO TOTAL  ENROLLMENT PERIOD: NORMAL
CV ZIO VT AVG BPM: 136 BPM
CV ZIO VT BPM HIGH: 207 BPM
CV ZIO VT BPM LOW: 60 BPM
CV ZIO VT COUNT: 6
CV ZIO VT F EPI AVG BPM: 176
CV ZIO VT F EPI BEATS: 12 BEATS
CV ZIO VT F EPI BPM HIGH: 207
CV ZIO VT F EPI BPM LOW: 130
CV ZIO VT F EPI DUR: 4.6 SEC
CV ZIO VT F EPI END: NORMAL
CV ZIO VT F EPI START: NORMAL
CV ZIO VT L EPI AVG BPM: 176
CV ZIO VT L EPI BEATS: 12 BEATS
CV ZIO VT L EPI BPM HIGH: 207 BPM
CV ZIO VT L EPI BPM LOW: 130 BPM
CV ZIO VT L EPI DUR: 4.6
CV ZIO VT L EPI END: NORMAL
CV ZIO VT L EPI START: NORMAL

## 2025-07-11 ENCOUNTER — PREP FOR PROCEDURE (OUTPATIENT)
Dept: NON INVASIVE DIAGNOSTICS | Facility: HOSPITAL | Age: 60
End: 2025-07-11

## 2025-07-11 DIAGNOSIS — I49.3 PVC'S (PREMATURE VENTRICULAR CONTRACTIONS): Primary | ICD-10-CM

## 2025-07-11 RX ORDER — METOPROLOL SUCCINATE 25 MG/1
25 TABLET, EXTENDED RELEASE ORAL DAILY
Qty: 30 TABLET | Refills: 5 | Status: SHIPPED | OUTPATIENT
Start: 2025-07-11

## 2025-07-11 RX ORDER — SODIUM CHLORIDE 9 MG/ML
75 INJECTION, SOLUTION INTRAVENOUS CONTINUOUS
Status: CANCELLED | OUTPATIENT
Start: 2025-07-11 | End: 2025-07-11

## 2025-07-11 NOTE — TELEPHONE ENCOUNTER
----- Message from Jomar Santillan MD sent at 7/10/2025  5:57 PM EDT -----  Can we tell him that Shayo showed brief episode of NSVT and he would benefit from low dose of BB - metoprolol XL 25 mg daily?     Thx  ----- Message -----  From: Jomar Santillan MD  Sent: 7/10/2025   1:30 PM EDT  To: Jomar Santillan MD

## 2025-07-11 NOTE — TELEPHONE ENCOUNTER
Spoke with patient about the results.   He would be okay with taking the medication but was concerned as he has a cardiac cath procedure on 7/14 and was curious if the medication would affect that. Please advise.

## 2025-07-14 ENCOUNTER — HOSPITAL ENCOUNTER (OUTPATIENT)
Facility: HOSPITAL | Age: 60
Setting detail: OUTPATIENT SURGERY
Discharge: HOME | End: 2025-07-14
Attending: INTERNAL MEDICINE | Admitting: INTERNAL MEDICINE
Payer: COMMERCIAL

## 2025-07-14 VITALS
HEART RATE: 65 BPM | TEMPERATURE: 97.2 F | DIASTOLIC BLOOD PRESSURE: 85 MMHG | WEIGHT: 270 LBS | SYSTOLIC BLOOD PRESSURE: 156 MMHG | OXYGEN SATURATION: 98 % | BODY MASS INDEX: 38.65 KG/M2 | HEIGHT: 70 IN | RESPIRATION RATE: 18 BRPM

## 2025-07-14 DIAGNOSIS — R94.39 ABNORMAL STRESS TEST: ICD-10-CM

## 2025-07-14 DIAGNOSIS — I47.29 NSVT (NONSUSTAINED VENTRICULAR TACHYCARDIA) (HCC): ICD-10-CM

## 2025-07-14 DIAGNOSIS — I49.3 PVC (PREMATURE VENTRICULAR CONTRACTION): ICD-10-CM

## 2025-07-14 DIAGNOSIS — I49.8 OTHER SPECIFIED CARDIAC ARRHYTHMIAS: ICD-10-CM

## 2025-07-14 LAB
ATRIAL RATE: 62 BPM
P AXIS: 74 DEGREES
PR INTERVAL: 164 MS
QRS AXIS: 23 DEGREES
QRSD INTERVAL: 88 MS
QT INTERVAL: 400 MS
QTC INTERVAL: 406 MS
T WAVE AXIS: 70 DEGREES
VENTRICULAR RATE: 62 BPM

## 2025-07-14 PROCEDURE — 93010 ELECTROCARDIOGRAM REPORT: CPT | Performed by: INTERNAL MEDICINE

## 2025-07-14 PROCEDURE — C1894 INTRO/SHEATH, NON-LASER: HCPCS | Performed by: INTERNAL MEDICINE

## 2025-07-14 PROCEDURE — 93454 CORONARY ARTERY ANGIO S&I: CPT | Performed by: INTERNAL MEDICINE

## 2025-07-14 PROCEDURE — C1769 GUIDE WIRE: HCPCS | Performed by: INTERNAL MEDICINE

## 2025-07-14 PROCEDURE — 76937 US GUIDE VASCULAR ACCESS: CPT | Performed by: INTERNAL MEDICINE

## 2025-07-14 PROCEDURE — 99152 MOD SED SAME PHYS/QHP 5/>YRS: CPT | Performed by: INTERNAL MEDICINE

## 2025-07-14 PROCEDURE — NC001 PR NO CHARGE: Performed by: PHYSICIAN ASSISTANT

## 2025-07-14 RX ORDER — FENTANYL CITRATE 50 UG/ML
INJECTION, SOLUTION INTRAMUSCULAR; INTRAVENOUS CODE/TRAUMA/SEDATION MEDICATION
Status: DISCONTINUED | OUTPATIENT
Start: 2025-07-14 | End: 2025-07-14 | Stop reason: HOSPADM

## 2025-07-14 RX ORDER — HEPARIN SODIUM 1000 [USP'U]/ML
INJECTION, SOLUTION INTRAVENOUS; SUBCUTANEOUS CODE/TRAUMA/SEDATION MEDICATION
Status: DISCONTINUED | OUTPATIENT
Start: 2025-07-14 | End: 2025-07-14 | Stop reason: HOSPADM

## 2025-07-14 RX ORDER — VERAPAMIL HCL 2.5 MG/ML
AMPUL (ML) INTRAVENOUS CODE/TRAUMA/SEDATION MEDICATION
Status: DISCONTINUED | OUTPATIENT
Start: 2025-07-14 | End: 2025-07-14 | Stop reason: HOSPADM

## 2025-07-14 RX ORDER — LIDOCAINE WITH 8.4% SOD BICARB 0.9%(10ML)
SYRINGE (ML) INJECTION CODE/TRAUMA/SEDATION MEDICATION
Status: DISCONTINUED | OUTPATIENT
Start: 2025-07-14 | End: 2025-07-14 | Stop reason: HOSPADM

## 2025-07-14 RX ORDER — NITROGLYCERIN 20 MG/100ML
INJECTION INTRAVENOUS CODE/TRAUMA/SEDATION MEDICATION
Status: DISCONTINUED | OUTPATIENT
Start: 2025-07-14 | End: 2025-07-14 | Stop reason: HOSPADM

## 2025-07-14 RX ORDER — ASPIRIN 81 MG/1
81 TABLET ORAL DAILY
Status: DISCONTINUED | OUTPATIENT
Start: 2025-07-14 | End: 2025-07-14 | Stop reason: HOSPADM

## 2025-07-14 RX ORDER — SODIUM CHLORIDE 9 MG/ML
75 INJECTION, SOLUTION INTRAVENOUS CONTINUOUS
Status: DISCONTINUED | OUTPATIENT
Start: 2025-07-14 | End: 2025-07-14

## 2025-07-14 RX ORDER — MIDAZOLAM HYDROCHLORIDE 2 MG/2ML
INJECTION, SOLUTION INTRAMUSCULAR; INTRAVENOUS CODE/TRAUMA/SEDATION MEDICATION
Status: DISCONTINUED | OUTPATIENT
Start: 2025-07-14 | End: 2025-07-14 | Stop reason: HOSPADM

## 2025-07-14 RX ORDER — SODIUM CHLORIDE 9 MG/ML
75 INJECTION, SOLUTION INTRAVENOUS CONTINUOUS
Status: DISPENSED | OUTPATIENT
Start: 2025-07-14 | End: 2025-07-14

## 2025-07-14 RX ADMIN — ASPIRIN 81 MG: 81 TABLET, COATED ORAL at 09:24

## 2025-07-14 NOTE — H&P
H&P Exam - Cardiology  Guille Macias 60 y.o. male MRN: [id number]  Unit/Bed#: [facility] CATH LAB ROOM Encounter: [id number]    ASSESSMENT:   Abnormal stress test    PLAN:   Cardiac cath    Admission Status: OUTPATIENT NO CHARGE BED  Expected Length of Stay: <2 Midnights    SUBJECTIVE:   HPI:  Guille Macias is a 60 y.o. male who presents with abnormal stress concerning for ischemia. Pt was seen by Dr Santillan for frequent PVC's. Pt had different morphology PVC's during stress test concerning for ischemia. Pt currently feeling well, denies cp, sob. Pt has been having chest pain that appears atypical. Pt has hx of DM, htn ,hpl, obesity. Pt did not take his ASA this AM and will be given some here. Further recommendations will be based upon cath findings.     REVIEW OF SYSTEMS:  Constitutional:  Denies fever or chills   Eyes:  Denies change in visual acuity   HENT:  Denies nasal congestion or sore throat   Respiratory:  Denies cough or shortness of breath   Cardiovascular:  +cp  GI:  Denies abdominal pain, nausea, vomiting, bloody stools or diarrhea   :  Denies dysuria, frequency, difficulty in micturition and nocturia  Musculoskeletal:  Denies back pain or joint pain   Neurologic:  Denies headache, focal weakness or sensory changes   Endocrine:  Denies polyuria or polydipsia   Lymphatic:  Denies swollen glands   Psychiatric:  Denies depression or anxiety     HISTORICAL INFO:  Past Medical History[1]  Past Surgical History[2]  Social History   Social History     Substance and Sexual Activity   Alcohol Use Yes    Alcohol/week: 6.0 standard drinks of alcohol    Types: 2 Cans of beer, 4 Shots of liquor per week    Comment: Social     Social History     Substance and Sexual Activity   Drug Use No     Tobacco Use History[3]    Family History:   Family History[4]    Meds/Allergies   all medications and allergies reviewed  Allergies[5]    OBJECTIVE:   Vitals:  Vitals:    07/14/25 0841   BP: 139/83   Pulse: 60   Resp: 16    Temp: (!) 97.2 °F (36.2 °C)   SpO2: 97%     Orthostatic Blood Pressures      Flowsheet Row Most Recent Value   Blood Pressure 139/83 filed at 07/14/2025 0841          No intake or output data in the 24 hours ending 07/14/25 0924  Invasive Devices       Peripheral Intravenous Line  Duration             Peripheral IV 07/14/25 Dorsal (posterior);Left Hand <1 day                    PHYSICAL EXAMS:  General:  Patient is not in acute distress, laying in the bed comfortably, awake, alert responding to commands.  Head: Normocephalic, Atraumatic.  HEENT:  Both pupils normal-size atraumatic, normocephalic, nonicteric  Neck:  JVP not raised. Trachea central  Respiratory:  Bronchovascular breathing all over the chest without any accompaniment  Cardiovascular:  S1 S2 normal RR  GI:  Abdomen soft nontender. Liver and spleen normal size, no free fluid, hernial sites unremarkable without any cough impulse  Lymphatic:  No cervical or inguinal lymphadenopathy  Neurologic:  Patient is awake alert, responding to command, well-oriented to time and place and person moving all extremities ambulating well    Lab Results:   I have personally reviewed pertinent lab results.               Code Status: Level 1 - Full Code      Counseling / Coordination of Care  Total floor / unit time spent today 20 minutes minutes.  Greater than 50% of total time was spent with the patient and / or family counseling and / or coordination of care.  A description of the counseling / coordination of care:    Malu Cai PA-C  7/14/2025,9:24 AM         [1]   Past Medical History:  Diagnosis Date    Allergic     [facility]    Arthritis     Last assessed: 5/21/12    Diabetes mellitus (HCC) 2/20/07    Off and on for 15Years    History of bariatric surgery     Hypertension 2/14/22    Morbid obesity due to excess calories (HCC)     Last assessed: 5/21/12    Obesity     Postsurgical malabsorption     Type 2 diabetes mellitus (HCC)     Last assessed: 6/5/15     Vitamin B1 deficiency     Last assessed: 9/29/14    Vitamin D deficiency     Last assessed: 9/29/14   [2]   Past Surgical History:  Procedure Laterality Date    GASTRIC BYPASS      For Morbid Obesity. Managed by: José Miguel Deleon. Last assessed: 6/16/14    HERNIA REPAIR     [3]   Social History  Tobacco Use   Smoking Status Former    Current packs/day: 0.00    Average packs/day: 0.3 packs/day for 10.0 years (2.5 ttl pk-yrs)    Types: Cigarettes    Start date: 4/11/2000    Quit date: 4/11/2010    Years since quitting: 15.2    Passive exposure: Never   Smokeless Tobacco Never   Tobacco Comments    occasionally every couple days   [4]   Family History  Problem Relation Name Age of Onset    Heart failure Mother          Congestive    Hypertension Mother      Heart disease Mother      Bone cancer Father      Throat cancer Father          Laryngeal    Diabetes Neg Hx      Thyroid disease Neg Hx      Stroke Neg Hx     [5]   Allergies  Allergen Reactions    Prunus Persica Anaphylaxis     Also frankie, plums, apricot, nectarine

## 2025-07-14 NOTE — DISCHARGE INSTR - AVS FIRST PAGE
1. Please see the post cardiac catheterization dishcarge instructions.   No heavy lifting, greater than 10 lbs for 5 days strenuous  activity for 48 hrs.    2.Remove band aid tomorrow.  Shower and wash area- wrist gently with soap and water- beginning tomorrow. Rinse and pat dry.     No tub baths, hot tubs or swimming for 5 days.     3. Please call our office ([phone]) if you have any fever, redness, swelling, discharge from your wrist access site.    4.No driving for 2 days

## 2025-07-14 NOTE — LETTER
July 14, 2025     Patient: Mr rayo       Date of Visit: 7/14/2025       To Whom it May Concern:    Mr Rayo was in the hospital on 7/14 for a cardia cath procedure and was accompanied by his wife - please excuse her from work for 7/14/2025

## 2025-07-22 DIAGNOSIS — E66.01 MORBID OBESITY WITH BMI OF 40.0-44.9, ADULT (HCC): ICD-10-CM

## 2025-07-22 DIAGNOSIS — E11.21 CONTROLLED TYPE 2 DIABETES MELLITUS WITH DIABETIC NEPHROPATHY, WITHOUT LONG-TERM CURRENT USE OF INSULIN (HCC): ICD-10-CM

## 2025-07-22 RX ORDER — TIRZEPATIDE 7.5 MG/.5ML
7.5 INJECTION, SOLUTION SUBCUTANEOUS WEEKLY
Qty: 2 ML | Refills: 0 | Status: SHIPPED | OUTPATIENT
Start: 2025-07-22

## 2025-08-18 ENCOUNTER — HOSPITAL ENCOUNTER (OUTPATIENT)
Dept: GASTROENTEROLOGY | Facility: HOSPITAL | Age: 60
Setting detail: OUTPATIENT SURGERY
Discharge: HOME | End: 2025-08-18
Attending: INTERNAL MEDICINE
Payer: COMMERCIAL

## 2025-08-18 ENCOUNTER — ANESTHESIA EVENT (OUTPATIENT)
Dept: GASTROENTEROLOGY | Facility: HOSPITAL | Age: 60
End: 2025-08-18
Payer: COMMERCIAL

## 2025-08-18 ENCOUNTER — ANESTHESIA (OUTPATIENT)
Dept: GASTROENTEROLOGY | Facility: HOSPITAL | Age: 60
End: 2025-08-18
Payer: COMMERCIAL

## 2025-08-18 VITALS
SYSTOLIC BLOOD PRESSURE: 151 MMHG | HEART RATE: 50 BPM | HEIGHT: 72 IN | RESPIRATION RATE: 16 BRPM | WEIGHT: 269.62 LBS | DIASTOLIC BLOOD PRESSURE: 75 MMHG | TEMPERATURE: 98.3 F | BODY MASS INDEX: 36.52 KG/M2 | OXYGEN SATURATION: 100 %

## 2025-08-18 DIAGNOSIS — Z12.11 SCREENING FOR COLON CANCER: ICD-10-CM

## 2025-08-18 RX ORDER — SODIUM CHLORIDE, SODIUM LACTATE, POTASSIUM CHLORIDE, CALCIUM CHLORIDE 600; 310; 30; 20 MG/100ML; MG/100ML; MG/100ML; MG/100ML
INJECTION, SOLUTION INTRAVENOUS CONTINUOUS PRN
Status: DISCONTINUED | OUTPATIENT
Start: 2025-08-18 | End: 2025-08-18

## 2025-08-18 RX ORDER — LIDOCAINE HYDROCHLORIDE 20 MG/ML
INJECTION, SOLUTION EPIDURAL; INFILTRATION; INTRACAUDAL; PERINEURAL AS NEEDED
Status: DISCONTINUED | OUTPATIENT
Start: 2025-08-18 | End: 2025-08-18

## 2025-08-18 RX ORDER — PROPOFOL 10 MG/ML
INJECTION, EMULSION INTRAVENOUS AS NEEDED
Status: DISCONTINUED | OUTPATIENT
Start: 2025-08-18 | End: 2025-08-18

## 2025-08-18 RX ADMIN — LIDOCAINE HYDROCHLORIDE 100 MG: 20 INJECTION, SOLUTION EPIDURAL; INFILTRATION; INTRACAUDAL at 07:38

## 2025-08-18 RX ADMIN — PROPOFOL 35 MG: 10 INJECTION, EMULSION INTRAVENOUS at 07:47

## 2025-08-18 RX ADMIN — PROPOFOL 65 MG: 10 INJECTION, EMULSION INTRAVENOUS at 07:40

## 2025-08-18 RX ADMIN — PROPOFOL 35 MG: 10 INJECTION, EMULSION INTRAVENOUS at 07:49

## 2025-08-18 RX ADMIN — SODIUM CHLORIDE, SODIUM LACTATE, POTASSIUM CHLORIDE, AND CALCIUM CHLORIDE: .6; .31; .03; .02 INJECTION, SOLUTION INTRAVENOUS at 06:55

## 2025-08-18 RX ADMIN — PROPOFOL 30 MG: 10 INJECTION, EMULSION INTRAVENOUS at 07:55

## 2025-08-18 RX ADMIN — PROPOFOL 100 MG: 10 INJECTION, EMULSION INTRAVENOUS at 07:39

## 2025-08-18 RX ADMIN — PROPOFOL 35 MG: 10 INJECTION, EMULSION INTRAVENOUS at 07:43

## (undated) DEVICE — Device